# Patient Record
Sex: MALE | Race: WHITE | NOT HISPANIC OR LATINO | Employment: OTHER | URBAN - METROPOLITAN AREA
[De-identification: names, ages, dates, MRNs, and addresses within clinical notes are randomized per-mention and may not be internally consistent; named-entity substitution may affect disease eponyms.]

---

## 2017-01-02 ENCOUNTER — TRANSCRIBE ORDERS (OUTPATIENT)
Dept: RADIOLOGY | Facility: HOSPITAL | Age: 82
End: 2017-01-02

## 2017-01-02 ENCOUNTER — HOSPITAL ENCOUNTER (OUTPATIENT)
Dept: RADIOLOGY | Facility: HOSPITAL | Age: 82
Discharge: HOME | End: 2017-01-02
Payer: MEDICARE

## 2017-01-02 DIAGNOSIS — M54.5 LOW BACK PAIN, UNSPECIFIED BACK PAIN LATERALITY, UNSPECIFIED CHRONICITY, WITH SCIATICA PRESENCE UNSPECIFIED: ICD-10-CM

## 2017-01-02 DIAGNOSIS — M54.5 LOW BACK PAIN, UNSPECIFIED BACK PAIN LATERALITY, UNSPECIFIED CHRONICITY, WITH SCIATICA PRESENCE UNSPECIFIED: Primary | ICD-10-CM

## 2017-01-02 PROCEDURE — 72110 X-RAY EXAM L-2 SPINE 4/>VWS: CPT

## 2017-01-09 ENCOUNTER — GENERIC CONVERSION - ENCOUNTER (OUTPATIENT)
Dept: OTHER | Facility: OTHER | Age: 82
End: 2017-01-09

## 2017-01-18 ENCOUNTER — GENERIC CONVERSION - ENCOUNTER (OUTPATIENT)
Dept: OTHER | Facility: OTHER | Age: 82
End: 2017-01-18

## 2017-01-24 ENCOUNTER — ALLSCRIPTS OFFICE VISIT (OUTPATIENT)
Dept: OTHER | Facility: OTHER | Age: 82
End: 2017-01-24

## 2017-01-24 DIAGNOSIS — I10 ESSENTIAL (PRIMARY) HYPERTENSION: ICD-10-CM

## 2017-01-24 DIAGNOSIS — I25.10 ATHEROSCLEROTIC HEART DISEASE OF NATIVE CORONARY ARTERY WITHOUT ANGINA PECTORIS: ICD-10-CM

## 2017-01-24 DIAGNOSIS — E78.5 HYPERLIPIDEMIA: ICD-10-CM

## 2017-01-24 DIAGNOSIS — M54.2 CERVICALGIA: ICD-10-CM

## 2017-01-30 ENCOUNTER — APPOINTMENT (OUTPATIENT)
Dept: LAB | Facility: HOSPITAL | Age: 82
End: 2017-01-30
Attending: UROLOGY
Payer: MEDICARE

## 2017-01-30 ENCOUNTER — HOSPITAL ENCOUNTER (OUTPATIENT)
Dept: RADIOLOGY | Facility: HOSPITAL | Age: 82
Discharge: HOME | End: 2017-01-30
Attending: INTERNAL MEDICINE
Payer: MEDICARE

## 2017-01-30 ENCOUNTER — GENERIC CONVERSION - ENCOUNTER (OUTPATIENT)
Dept: OTHER | Facility: OTHER | Age: 82
End: 2017-01-30

## 2017-01-30 ENCOUNTER — APPOINTMENT (OUTPATIENT)
Dept: LAB | Facility: HOSPITAL | Age: 82
End: 2017-01-30
Attending: INTERNAL MEDICINE
Payer: MEDICARE

## 2017-01-30 ENCOUNTER — TRANSCRIBE ORDERS (OUTPATIENT)
Dept: ADMINISTRATIVE | Facility: HOSPITAL | Age: 82
End: 2017-01-30

## 2017-01-30 DIAGNOSIS — Z85.46 PERSONAL HISTORY OF MALIGNANT NEOPLASM OF PROSTATE: ICD-10-CM

## 2017-01-30 DIAGNOSIS — M54.2 CERVICALGIA: ICD-10-CM

## 2017-01-30 DIAGNOSIS — Z85.46 PERSONAL HISTORY OF MALIGNANT NEOPLASM OF PROSTATE: Primary | ICD-10-CM

## 2017-01-30 DIAGNOSIS — E78.5 HYPERLIPIDEMIA: ICD-10-CM

## 2017-01-30 DIAGNOSIS — I10 ESSENTIAL (PRIMARY) HYPERTENSION: ICD-10-CM

## 2017-01-30 DIAGNOSIS — I25.10 ATHEROSCLEROTIC HEART DISEASE OF NATIVE CORONARY ARTERY WITHOUT ANGINA PECTORIS: ICD-10-CM

## 2017-01-30 LAB
ALBUMIN SERPL BCP-MCNC: 3.2 G/DL (ref 3.5–5)
ALP SERPL-CCNC: 74 U/L (ref 46–116)
ALT SERPL W P-5'-P-CCNC: 25 U/L (ref 12–78)
ANION GAP SERPL CALCULATED.3IONS-SCNC: 8 MMOL/L (ref 4–13)
AST SERPL W P-5'-P-CCNC: 15 U/L (ref 5–45)
BILIRUB SERPL-MCNC: 0.6 MG/DL (ref 0.2–1)
BUN SERPL-MCNC: 30 MG/DL (ref 5–25)
CALCIUM SERPL-MCNC: 8.6 MG/DL (ref 8.3–10.1)
CHLORIDE SERPL-SCNC: 106 MMOL/L (ref 100–108)
CHOLEST SERPL-MCNC: 118 MG/DL (ref 50–200)
CO2 SERPL-SCNC: 26 MMOL/L (ref 21–32)
CREAT SERPL-MCNC: 1.13 MG/DL (ref 0.6–1.3)
ERYTHROCYTE [DISTWIDTH] IN BLOOD BY AUTOMATED COUNT: 12.9 % (ref 11.6–15.1)
EST. AVERAGE GLUCOSE BLD GHB EST-MCNC: 137 MG/DL
GFR SERPL CREATININE-BSD FRML MDRD: >60 ML/MIN/1.73SQ M
GLUCOSE SERPL-MCNC: 99 MG/DL (ref 65–140)
HBA1C MFR BLD: 6.4 % (ref 4.2–6.3)
HCT VFR BLD AUTO: 39.2 % (ref 36.5–49.3)
HDLC SERPL-MCNC: 52 MG/DL (ref 40–60)
HGB BLD-MCNC: 12.9 G/DL (ref 12–17)
LDLC SERPL CALC-MCNC: 53 MG/DL (ref 0–100)
MCH RBC QN AUTO: 30.3 PG (ref 26.8–34.3)
MCHC RBC AUTO-ENTMCNC: 32.9 G/DL (ref 31.4–37.4)
MCV RBC AUTO: 92 FL (ref 82–98)
PLATELET # BLD AUTO: 260 THOUSANDS/UL (ref 149–390)
PMV BLD AUTO: 9.3 FL (ref 8.9–12.7)
POTASSIUM SERPL-SCNC: 4.4 MMOL/L (ref 3.5–5.3)
PROT SERPL-MCNC: 7.2 G/DL (ref 6.4–8.2)
PSA SERPL-MCNC: 0.4 NG/ML (ref 0–4)
RBC # BLD AUTO: 4.26 MILLION/UL (ref 3.88–5.62)
SODIUM SERPL-SCNC: 140 MMOL/L (ref 136–145)
TRIGL SERPL-MCNC: 67 MG/DL
TSH SERPL DL<=0.05 MIU/L-ACNC: 0.98 UIU/ML (ref 0.36–3.74)
WBC # BLD AUTO: 14.07 THOUSAND/UL (ref 4.31–10.16)

## 2017-01-30 PROCEDURE — 84443 ASSAY THYROID STIM HORMONE: CPT

## 2017-01-30 PROCEDURE — 36415 COLL VENOUS BLD VENIPUNCTURE: CPT

## 2017-01-30 PROCEDURE — 80061 LIPID PANEL: CPT

## 2017-01-30 PROCEDURE — 72050 X-RAY EXAM NECK SPINE 4/5VWS: CPT

## 2017-01-30 PROCEDURE — 83036 HEMOGLOBIN GLYCOSYLATED A1C: CPT

## 2017-01-30 PROCEDURE — G0103 PSA SCREENING: HCPCS

## 2017-01-30 PROCEDURE — 85027 COMPLETE CBC AUTOMATED: CPT

## 2017-01-30 PROCEDURE — 80053 COMPREHEN METABOLIC PANEL: CPT

## 2017-01-31 ENCOUNTER — GENERIC CONVERSION - ENCOUNTER (OUTPATIENT)
Dept: OTHER | Facility: OTHER | Age: 82
End: 2017-01-31

## 2017-02-05 ENCOUNTER — HOSPITAL ENCOUNTER (OUTPATIENT)
Facility: HOSPITAL | Age: 82
Setting detail: OBSERVATION
Discharge: HOME | End: 2017-02-06
Attending: EMERGENCY MEDICINE | Admitting: INTERNAL MEDICINE
Payer: MEDICARE

## 2017-02-05 ENCOUNTER — APPOINTMENT (EMERGENCY)
Dept: CT IMAGING | Facility: HOSPITAL | Age: 82
End: 2017-02-05
Payer: MEDICARE

## 2017-02-05 ENCOUNTER — GENERIC CONVERSION - ENCOUNTER (OUTPATIENT)
Dept: OTHER | Facility: OTHER | Age: 82
End: 2017-02-05

## 2017-02-05 DIAGNOSIS — J01.90 ACUTE SINUSITIS: ICD-10-CM

## 2017-02-05 DIAGNOSIS — R55 NEAR SYNCOPE: Primary | ICD-10-CM

## 2017-02-05 PROBLEM — G93.40 ACUTE ENCEPHALOPATHY: Status: ACTIVE | Noted: 2017-02-05

## 2017-02-05 PROBLEM — I25.10 CAD (CORONARY ARTERY DISEASE): Chronic | Status: ACTIVE | Noted: 2017-02-05

## 2017-02-05 LAB
ALBUMIN SERPL BCP-MCNC: 3.2 G/DL (ref 3.5–5)
ALP SERPL-CCNC: 74 U/L (ref 46–116)
ALT SERPL W P-5'-P-CCNC: 22 U/L (ref 12–78)
ANION GAP SERPL CALCULATED.3IONS-SCNC: 7 MMOL/L (ref 4–13)
AST SERPL W P-5'-P-CCNC: 20 U/L (ref 5–45)
BASOPHILS # BLD AUTO: 0.02 THOUSANDS/ΜL (ref 0–0.1)
BASOPHILS NFR BLD AUTO: 0 % (ref 0–1)
BILIRUB SERPL-MCNC: 0.6 MG/DL (ref 0.2–1)
BILIRUB UR QL STRIP: NEGATIVE
BUN SERPL-MCNC: 27 MG/DL (ref 5–25)
CALCIUM SERPL-MCNC: 9 MG/DL (ref 8.3–10.1)
CHLORIDE SERPL-SCNC: 104 MMOL/L (ref 100–108)
CLARITY UR: CLEAR
CO2 SERPL-SCNC: 28 MMOL/L (ref 21–32)
COLOR UR: YELLOW
CREAT SERPL-MCNC: 1.22 MG/DL (ref 0.6–1.3)
EOSINOPHIL # BLD AUTO: 0.16 THOUSAND/ΜL (ref 0–0.61)
EOSINOPHIL NFR BLD AUTO: 2 % (ref 0–6)
ERYTHROCYTE [DISTWIDTH] IN BLOOD BY AUTOMATED COUNT: 12.9 % (ref 11.6–15.1)
GFR SERPL CREATININE-BSD FRML MDRD: 57 ML/MIN/1.73SQ M
GLUCOSE SERPL-MCNC: 99 MG/DL (ref 65–140)
GLUCOSE UR STRIP-MCNC: ABNORMAL MG/DL
HCT VFR BLD AUTO: 37.2 % (ref 36.5–49.3)
HGB BLD-MCNC: 12.2 G/DL (ref 12–17)
HGB UR QL STRIP.AUTO: NEGATIVE
KETONES UR STRIP-MCNC: NEGATIVE MG/DL
LEUKOCYTE ESTERASE UR QL STRIP: NEGATIVE
LYMPHOCYTES # BLD AUTO: 1.5 THOUSANDS/ΜL (ref 0.6–4.47)
LYMPHOCYTES NFR BLD AUTO: 15 % (ref 14–44)
MCH RBC QN AUTO: 30.3 PG (ref 26.8–34.3)
MCHC RBC AUTO-ENTMCNC: 32.8 G/DL (ref 31.4–37.4)
MCV RBC AUTO: 92 FL (ref 82–98)
MONOCYTES # BLD AUTO: 0.75 THOUSAND/ΜL (ref 0.17–1.22)
MONOCYTES NFR BLD AUTO: 7 % (ref 4–12)
NEUTROPHILS # BLD AUTO: 7.8 THOUSANDS/ΜL (ref 1.85–7.62)
NEUTS SEG NFR BLD AUTO: 76 % (ref 43–75)
NITRITE UR QL STRIP: NEGATIVE
NT-PROBNP SERPL-MCNC: 1080 PG/ML
PH UR STRIP.AUTO: 5.5 [PH] (ref 4.5–8)
PLATELET # BLD AUTO: 228 THOUSANDS/UL (ref 149–390)
PMV BLD AUTO: 9.2 FL (ref 8.9–12.7)
POTASSIUM SERPL-SCNC: 4.5 MMOL/L (ref 3.5–5.3)
PROT SERPL-MCNC: 7.1 G/DL (ref 6.4–8.2)
PROT UR STRIP-MCNC: NEGATIVE MG/DL
RBC # BLD AUTO: 4.03 MILLION/UL (ref 3.88–5.62)
SODIUM SERPL-SCNC: 139 MMOL/L (ref 136–145)
SP GR UR STRIP.AUTO: 1.02 (ref 1–1.03)
TROPONIN I SERPL-MCNC: <0.02 NG/ML
TROPONIN I SERPL-MCNC: <0.02 NG/ML
UROBILINOGEN UR QL STRIP.AUTO: 0.2 E.U./DL
WBC # BLD AUTO: 10.23 THOUSAND/UL (ref 4.31–10.16)

## 2017-02-05 PROCEDURE — 93005 ELECTROCARDIOGRAM TRACING: CPT | Performed by: EMERGENCY MEDICINE

## 2017-02-05 PROCEDURE — 84484 ASSAY OF TROPONIN QUANT: CPT | Performed by: NURSE PRACTITIONER

## 2017-02-05 PROCEDURE — 84484 ASSAY OF TROPONIN QUANT: CPT | Performed by: EMERGENCY MEDICINE

## 2017-02-05 PROCEDURE — 85025 COMPLETE CBC W/AUTO DIFF WBC: CPT | Performed by: EMERGENCY MEDICINE

## 2017-02-05 PROCEDURE — 80053 COMPREHEN METABOLIC PANEL: CPT | Performed by: EMERGENCY MEDICINE

## 2017-02-05 PROCEDURE — 36415 COLL VENOUS BLD VENIPUNCTURE: CPT | Performed by: EMERGENCY MEDICINE

## 2017-02-05 PROCEDURE — 70450 CT HEAD/BRAIN W/O DYE: CPT

## 2017-02-05 PROCEDURE — 99285 EMERGENCY DEPT VISIT HI MDM: CPT

## 2017-02-05 PROCEDURE — 81003 URINALYSIS AUTO W/O SCOPE: CPT | Performed by: EMERGENCY MEDICINE

## 2017-02-05 PROCEDURE — 83880 ASSAY OF NATRIURETIC PEPTIDE: CPT | Performed by: NURSE PRACTITIONER

## 2017-02-05 RX ORDER — CLOPIDOGREL BISULFATE 75 MG/1
75 TABLET ORAL DAILY
COMMUNITY
End: 2018-08-05 | Stop reason: SDUPTHER

## 2017-02-05 RX ORDER — AZITHROMYCIN 250 MG/1
250 TABLET, FILM COATED ORAL EVERY 24 HOURS
Status: DISCONTINUED | OUTPATIENT
Start: 2017-02-06 | End: 2017-02-06 | Stop reason: HOSPADM

## 2017-02-05 RX ORDER — MAGNESIUM HYDROXIDE/ALUMINUM HYDROXICE/SIMETHICONE 120; 1200; 1200 MG/30ML; MG/30ML; MG/30ML
30 SUSPENSION ORAL EVERY 6 HOURS PRN
Status: DISCONTINUED | OUTPATIENT
Start: 2017-02-05 | End: 2017-02-06 | Stop reason: HOSPADM

## 2017-02-05 RX ORDER — DOCUSATE SODIUM 100 MG/1
100 CAPSULE, LIQUID FILLED ORAL 2 TIMES DAILY
Status: DISCONTINUED | OUTPATIENT
Start: 2017-02-05 | End: 2017-02-06 | Stop reason: HOSPADM

## 2017-02-05 RX ORDER — LISINOPRIL 20 MG/1
20 TABLET ORAL DAILY
COMMUNITY
End: 2018-05-16 | Stop reason: SDUPTHER

## 2017-02-05 RX ORDER — NITROGLYCERIN 0.4 MG/1
0.4 TABLET SUBLINGUAL
Status: DISCONTINUED | OUTPATIENT
Start: 2017-02-05 | End: 2017-02-06 | Stop reason: HOSPADM

## 2017-02-05 RX ORDER — ATORVASTATIN CALCIUM 20 MG/1
20 TABLET, FILM COATED ORAL DAILY
Status: ON HOLD | COMMUNITY
End: 2017-08-21

## 2017-02-05 RX ORDER — ATORVASTATIN CALCIUM 20 MG/1
20 TABLET, FILM COATED ORAL
Status: COMPLETED | OUTPATIENT
Start: 2017-02-05 | End: 2017-02-05

## 2017-02-05 RX ORDER — HYDROCODONE BITARTRATE AND ACETAMINOPHEN 5; 325 MG/1; MG/1
1 TABLET ORAL EVERY 6 HOURS PRN
Status: DISCONTINUED | OUTPATIENT
Start: 2017-02-05 | End: 2017-02-06 | Stop reason: HOSPADM

## 2017-02-05 RX ORDER — CLOPIDOGREL BISULFATE 75 MG/1
75 TABLET ORAL DAILY
Status: DISCONTINUED | OUTPATIENT
Start: 2017-02-06 | End: 2017-02-06 | Stop reason: HOSPADM

## 2017-02-05 RX ORDER — HYDROCODONE BITARTRATE AND ACETAMINOPHEN 5; 325 MG/1; MG/1
1 TABLET ORAL EVERY 6 HOURS PRN
COMMUNITY
End: 2018-02-27 | Stop reason: CLARIF

## 2017-02-05 RX ORDER — METOPROLOL TARTRATE 50 MG/1
50 TABLET, FILM COATED ORAL 2 TIMES DAILY
COMMUNITY
End: 2019-01-01 | Stop reason: CLARIF

## 2017-02-05 RX ORDER — GABAPENTIN 300 MG/1
300 CAPSULE ORAL 3 TIMES DAILY
Status: DISCONTINUED | OUTPATIENT
Start: 2017-02-06 | End: 2017-02-06 | Stop reason: HOSPADM

## 2017-02-05 RX ORDER — AZITHROMYCIN 250 MG/1
500 TABLET, FILM COATED ORAL ONCE
Status: COMPLETED | OUTPATIENT
Start: 2017-02-05 | End: 2017-02-05

## 2017-02-05 RX ORDER — ATORVASTATIN CALCIUM 20 MG/1
20 TABLET, FILM COATED ORAL
Status: DISCONTINUED | OUTPATIENT
Start: 2017-02-06 | End: 2017-02-06 | Stop reason: HOSPADM

## 2017-02-05 RX ORDER — SODIUM CHLORIDE 9 MG/ML
100 INJECTION, SOLUTION INTRAVENOUS CONTINUOUS
Status: DISPENSED | OUTPATIENT
Start: 2017-02-05 | End: 2017-02-06

## 2017-02-05 RX ORDER — ASPIRIN 81 MG/1
81 TABLET ORAL DAILY
Status: DISCONTINUED | OUTPATIENT
Start: 2017-02-06 | End: 2017-02-06 | Stop reason: HOSPADM

## 2017-02-05 RX ORDER — ASPIRIN 81 MG/1
81 TABLET ORAL DAILY
COMMUNITY
End: 2018-01-01 | Stop reason: HOSPADM

## 2017-02-05 RX ORDER — TAMSULOSIN HYDROCHLORIDE 0.4 MG/1
0.4 CAPSULE ORAL
Status: DISCONTINUED | OUTPATIENT
Start: 2017-02-06 | End: 2017-02-06 | Stop reason: HOSPADM

## 2017-02-05 RX ORDER — ONDANSETRON 2 MG/ML
4 INJECTION INTRAMUSCULAR; INTRAVENOUS EVERY 6 HOURS PRN
Status: DISCONTINUED | OUTPATIENT
Start: 2017-02-05 | End: 2017-02-06 | Stop reason: HOSPADM

## 2017-02-05 RX ORDER — TAMSULOSIN HYDROCHLORIDE 0.4 MG/1
0.4 CAPSULE ORAL
Status: COMPLETED | OUTPATIENT
Start: 2017-02-05 | End: 2017-02-05

## 2017-02-05 RX ORDER — METOPROLOL TARTRATE 50 MG/1
50 TABLET, FILM COATED ORAL 2 TIMES DAILY
Status: DISCONTINUED | OUTPATIENT
Start: 2017-02-05 | End: 2017-02-06 | Stop reason: HOSPADM

## 2017-02-05 RX ORDER — GABAPENTIN 300 MG/1
300 CAPSULE ORAL 3 TIMES DAILY
COMMUNITY
End: 2018-02-22

## 2017-02-05 RX ORDER — NITROGLYCERIN 0.4 MG/1
0.4 TABLET SUBLINGUAL
COMMUNITY
End: 2018-02-27 | Stop reason: SDUPTHER

## 2017-02-05 RX ORDER — MONTELUKAST SODIUM 10 MG/1
10 TABLET ORAL
Status: DISCONTINUED | OUTPATIENT
Start: 2017-02-05 | End: 2017-02-06 | Stop reason: HOSPADM

## 2017-02-05 RX ORDER — TAMSULOSIN HYDROCHLORIDE 0.4 MG/1
0.4 CAPSULE ORAL
COMMUNITY
End: 2019-01-01 | Stop reason: SDUPTHER

## 2017-02-05 RX ORDER — MONTELUKAST SODIUM 10 MG/1
10 TABLET ORAL
COMMUNITY
End: 2018-07-15 | Stop reason: SDUPTHER

## 2017-02-05 RX ORDER — LISINOPRIL 20 MG/1
20 TABLET ORAL DAILY
Status: DISCONTINUED | OUTPATIENT
Start: 2017-02-06 | End: 2017-02-06 | Stop reason: HOSPADM

## 2017-02-05 RX ADMIN — TAMSULOSIN HYDROCHLORIDE 0.4 MG: 0.4 CAPSULE ORAL at 22:41

## 2017-02-05 RX ADMIN — METOPROLOL TARTRATE 50 MG: 50 TABLET ORAL at 22:41

## 2017-02-05 RX ADMIN — ATORVASTATIN CALCIUM 20 MG: 20 TABLET, FILM COATED ORAL at 22:41

## 2017-02-05 RX ADMIN — AZITHROMYCIN 500 MG: 250 TABLET, FILM COATED ORAL at 19:11

## 2017-02-05 RX ADMIN — SODIUM CHLORIDE 100 ML/HR: 0.9 INJECTION, SOLUTION INTRAVENOUS at 22:26

## 2017-02-06 ENCOUNTER — GENERIC CONVERSION - ENCOUNTER (OUTPATIENT)
Dept: OTHER | Facility: OTHER | Age: 82
End: 2017-02-06

## 2017-02-06 ENCOUNTER — APPOINTMENT (OUTPATIENT)
Dept: NON INVASIVE DIAGNOSTICS | Facility: HOSPITAL | Age: 82
End: 2017-02-06
Payer: MEDICARE

## 2017-02-06 VITALS
WEIGHT: 176.37 LBS | DIASTOLIC BLOOD PRESSURE: 82 MMHG | SYSTOLIC BLOOD PRESSURE: 181 MMHG | HEART RATE: 70 BPM | RESPIRATION RATE: 18 BRPM | HEIGHT: 68 IN | BODY MASS INDEX: 26.73 KG/M2 | TEMPERATURE: 97.8 F | OXYGEN SATURATION: 94 %

## 2017-02-06 PROBLEM — G93.40 ACUTE ENCEPHALOPATHY: Status: RESOLVED | Noted: 2017-02-05 | Resolved: 2017-02-06

## 2017-02-06 PROBLEM — R55 NEAR SYNCOPE: Status: RESOLVED | Noted: 2017-02-05 | Resolved: 2017-02-06

## 2017-02-06 PROBLEM — J01.90 ACUTE SINUSITIS: Status: RESOLVED | Noted: 2017-02-05 | Resolved: 2017-02-06

## 2017-02-06 LAB
ANION GAP SERPL CALCULATED.3IONS-SCNC: 8 MMOL/L (ref 4–13)
ATRIAL RATE: 64 BPM
ATRIAL RATE: 65 BPM
BUN SERPL-MCNC: 21 MG/DL (ref 5–25)
CALCIUM SERPL-MCNC: 8.1 MG/DL (ref 8.3–10.1)
CHLORIDE SERPL-SCNC: 108 MMOL/L (ref 100–108)
CO2 SERPL-SCNC: 26 MMOL/L (ref 21–32)
CREAT SERPL-MCNC: 1.01 MG/DL (ref 0.6–1.3)
ERYTHROCYTE [DISTWIDTH] IN BLOOD BY AUTOMATED COUNT: 12.8 % (ref 11.6–15.1)
GFR SERPL CREATININE-BSD FRML MDRD: >60 ML/MIN/1.73SQ M
GLUCOSE SERPL-MCNC: 87 MG/DL (ref 65–140)
HCT VFR BLD AUTO: 34.1 % (ref 36.5–49.3)
HGB BLD-MCNC: 11.2 G/DL (ref 12–17)
MAGNESIUM SERPL-MCNC: 2 MG/DL (ref 1.6–2.6)
MCH RBC QN AUTO: 30.7 PG (ref 26.8–34.3)
MCHC RBC AUTO-ENTMCNC: 32.8 G/DL (ref 31.4–37.4)
MCV RBC AUTO: 93 FL (ref 82–98)
P AXIS: 61 DEGREES
P AXIS: 65 DEGREES
PLATELET # BLD AUTO: 202 THOUSANDS/UL (ref 149–390)
PMV BLD AUTO: 9.7 FL (ref 8.9–12.7)
POTASSIUM SERPL-SCNC: 4.2 MMOL/L (ref 3.5–5.3)
PR INTERVAL: 170 MS
PR INTERVAL: 174 MS
QRS AXIS: 25 DEGREES
QRS AXIS: 51 DEGREES
QRSD INTERVAL: 90 MS
QRSD INTERVAL: 92 MS
QT INTERVAL: 406 MS
QT INTERVAL: 422 MS
QTC INTERVAL: 422 MS
QTC INTERVAL: 435 MS
RBC # BLD AUTO: 3.65 MILLION/UL (ref 3.88–5.62)
SODIUM SERPL-SCNC: 142 MMOL/L (ref 136–145)
T WAVE AXIS: 1 DEGREES
T WAVE AXIS: 30 DEGREES
TROPONIN I SERPL-MCNC: <0.02 NG/ML
VENTRICULAR RATE: 64 BPM
VENTRICULAR RATE: 65 BPM
WBC # BLD AUTO: 6.18 THOUSAND/UL (ref 4.31–10.16)

## 2017-02-06 PROCEDURE — 85027 COMPLETE CBC AUTOMATED: CPT | Performed by: NURSE PRACTITIONER

## 2017-02-06 PROCEDURE — 93306 TTE W/DOPPLER COMPLETE: CPT

## 2017-02-06 PROCEDURE — 93005 ELECTROCARDIOGRAM TRACING: CPT | Performed by: NURSE PRACTITIONER

## 2017-02-06 PROCEDURE — 80048 BASIC METABOLIC PNL TOTAL CA: CPT | Performed by: NURSE PRACTITIONER

## 2017-02-06 PROCEDURE — 83735 ASSAY OF MAGNESIUM: CPT | Performed by: NURSE PRACTITIONER

## 2017-02-06 PROCEDURE — 84484 ASSAY OF TROPONIN QUANT: CPT | Performed by: NURSE PRACTITIONER

## 2017-02-06 RX ORDER — NICOTINE 21 MG/24HR
1 PATCH, TRANSDERMAL 24 HOURS TRANSDERMAL DAILY
Qty: 30 PATCH | Refills: 0 | Status: SHIPPED | OUTPATIENT
Start: 2017-02-06 | End: 2017-03-08

## 2017-02-06 RX ORDER — NICOTINE 21 MG/24HR
14 PATCH, TRANSDERMAL 24 HOURS TRANSDERMAL DAILY
Status: DISCONTINUED | OUTPATIENT
Start: 2017-02-06 | End: 2017-02-06 | Stop reason: HOSPADM

## 2017-02-06 RX ORDER — AZITHROMYCIN 250 MG/1
250 TABLET, FILM COATED ORAL EVERY 24 HOURS
Qty: 4 TABLET | Refills: 0 | Status: SHIPPED | OUTPATIENT
Start: 2017-02-06 | End: 2017-02-10

## 2017-02-06 RX ADMIN — CLOPIDOGREL 75 MG: 75 TABLET, FILM COATED ORAL at 09:36

## 2017-02-06 RX ADMIN — METOPROLOL TARTRATE 50 MG: 50 TABLET ORAL at 09:36

## 2017-02-06 RX ADMIN — ASPIRIN 81 MG: 81 TABLET, COATED ORAL at 09:36

## 2017-02-06 RX ADMIN — LISINOPRIL 20 MG: 20 TABLET ORAL at 09:36

## 2017-02-06 RX ADMIN — HYDROCODONE BITARTRATE AND ACETAMINOPHEN 1 TABLET: 5; 325 TABLET ORAL at 09:42

## 2017-02-14 ENCOUNTER — APPOINTMENT (OUTPATIENT)
Dept: PHYSICAL THERAPY | Facility: CLINIC | Age: 82
End: 2017-02-14
Payer: MEDICARE

## 2017-02-14 DIAGNOSIS — M54.2 CERVICALGIA: ICD-10-CM

## 2017-02-14 PROCEDURE — 97162 PT EVAL MOD COMPLEX 30 MIN: CPT

## 2017-02-14 PROCEDURE — G8981 BODY POS CURRENT STATUS: HCPCS

## 2017-02-14 PROCEDURE — 97014 ELECTRIC STIMULATION THERAPY: CPT

## 2017-02-14 PROCEDURE — G8982 BODY POS GOAL STATUS: HCPCS

## 2017-02-15 ENCOUNTER — GENERIC CONVERSION - ENCOUNTER (OUTPATIENT)
Dept: OTHER | Facility: OTHER | Age: 82
End: 2017-02-15

## 2017-02-17 ENCOUNTER — APPOINTMENT (OUTPATIENT)
Dept: PHYSICAL THERAPY | Facility: CLINIC | Age: 82
End: 2017-02-17
Payer: MEDICARE

## 2017-02-17 PROCEDURE — 97014 ELECTRIC STIMULATION THERAPY: CPT

## 2017-02-17 PROCEDURE — 97110 THERAPEUTIC EXERCISES: CPT

## 2017-02-17 PROCEDURE — 97140 MANUAL THERAPY 1/> REGIONS: CPT

## 2017-02-21 ENCOUNTER — APPOINTMENT (OUTPATIENT)
Dept: PHYSICAL THERAPY | Facility: CLINIC | Age: 82
End: 2017-02-21
Payer: MEDICARE

## 2017-02-21 PROCEDURE — 97014 ELECTRIC STIMULATION THERAPY: CPT

## 2017-02-21 PROCEDURE — 97140 MANUAL THERAPY 1/> REGIONS: CPT

## 2017-02-21 PROCEDURE — 97110 THERAPEUTIC EXERCISES: CPT

## 2017-02-22 ENCOUNTER — ALLSCRIPTS OFFICE VISIT (OUTPATIENT)
Dept: OTHER | Facility: OTHER | Age: 82
End: 2017-02-22

## 2017-02-24 ENCOUNTER — APPOINTMENT (OUTPATIENT)
Dept: PHYSICAL THERAPY | Facility: CLINIC | Age: 82
End: 2017-02-24
Payer: MEDICARE

## 2017-02-24 PROCEDURE — 97140 MANUAL THERAPY 1/> REGIONS: CPT

## 2017-02-24 PROCEDURE — 97014 ELECTRIC STIMULATION THERAPY: CPT

## 2017-02-24 PROCEDURE — G8982 BODY POS GOAL STATUS: HCPCS

## 2017-02-24 PROCEDURE — G8983 BODY POS D/C STATUS: HCPCS

## 2017-03-01 ENCOUNTER — HOSPITAL ENCOUNTER (OUTPATIENT)
Dept: NON INVASIVE DIAGNOSTICS | Facility: CLINIC | Age: 82
Discharge: HOME | End: 2017-03-01
Payer: MEDICARE

## 2017-03-01 DIAGNOSIS — I25.10 ATHEROSCLEROTIC HEART DISEASE OF NATIVE CORONARY ARTERY WITHOUT ANGINA PECTORIS: ICD-10-CM

## 2017-03-01 DIAGNOSIS — I10 ESSENTIAL (PRIMARY) HYPERTENSION: ICD-10-CM

## 2017-03-01 PROCEDURE — A9502 TC99M TETROFOSMIN: HCPCS

## 2017-03-01 PROCEDURE — 78452 HT MUSCLE IMAGE SPECT MULT: CPT

## 2017-03-01 PROCEDURE — 93017 CV STRESS TEST TRACING ONLY: CPT

## 2017-03-01 RX ADMIN — REGADENOSON 0.4 MG: 0.08 INJECTION, SOLUTION INTRAVENOUS at 09:00

## 2017-03-03 LAB
MAX DIASTOLIC BP: 79 MMHG
MAX HEART RATE: 77 BPM
MAX PREDICTED HEART RATE: 139 BPM
MAX. SYSTOLIC BP: 167 MMHG
PROTOCOL NAME: NORMAL
REASON FOR TERMINATION: NORMAL
TARGET HR FORMULA: NORMAL
TEST INDICATION: NORMAL
TIME IN EXERCISE PHASE: 165 S

## 2017-03-13 ENCOUNTER — ALLSCRIPTS OFFICE VISIT (OUTPATIENT)
Dept: OTHER | Facility: OTHER | Age: 82
End: 2017-03-13

## 2017-03-20 ENCOUNTER — ALLSCRIPTS OFFICE VISIT (OUTPATIENT)
Dept: OTHER | Facility: OTHER | Age: 82
End: 2017-03-20

## 2017-05-24 ENCOUNTER — GENERIC CONVERSION - ENCOUNTER (OUTPATIENT)
Dept: OTHER | Facility: OTHER | Age: 82
End: 2017-05-24

## 2017-05-31 ENCOUNTER — ALLSCRIPTS OFFICE VISIT (OUTPATIENT)
Dept: OTHER | Facility: OTHER | Age: 82
End: 2017-05-31

## 2017-05-31 DIAGNOSIS — I10 ESSENTIAL (PRIMARY) HYPERTENSION: ICD-10-CM

## 2017-05-31 DIAGNOSIS — I71.2 THORACIC AORTIC ANEURYSM WITHOUT RUPTURE (HCC): ICD-10-CM

## 2017-06-12 ENCOUNTER — TRANSCRIBE ORDERS (OUTPATIENT)
Dept: ADMINISTRATIVE | Facility: HOSPITAL | Age: 82
End: 2017-06-12

## 2017-06-12 ENCOUNTER — APPOINTMENT (OUTPATIENT)
Dept: LAB | Facility: HOSPITAL | Age: 82
End: 2017-06-12
Attending: INTERNAL MEDICINE
Payer: MEDICARE

## 2017-06-12 DIAGNOSIS — I71.2 THORACIC AORTIC ANEURYSM WITHOUT RUPTURE (HCC): ICD-10-CM

## 2017-06-12 DIAGNOSIS — I10 ESSENTIAL (PRIMARY) HYPERTENSION: ICD-10-CM

## 2017-06-12 LAB
ANION GAP SERPL CALCULATED.3IONS-SCNC: 7 MMOL/L (ref 4–13)
BUN SERPL-MCNC: 13 MG/DL (ref 5–25)
CALCIUM SERPL-MCNC: 8.7 MG/DL (ref 8.3–10.1)
CHLORIDE SERPL-SCNC: 108 MMOL/L (ref 100–108)
CO2 SERPL-SCNC: 28 MMOL/L (ref 21–32)
CREAT SERPL-MCNC: 1.27 MG/DL (ref 0.6–1.3)
GFR SERPL CREATININE-BSD FRML MDRD: 54.4 ML/MIN/1.73SQ M
GLUCOSE SERPL-MCNC: 111 MG/DL (ref 65–140)
POTASSIUM SERPL-SCNC: 3.6 MMOL/L (ref 3.5–5.3)
SODIUM SERPL-SCNC: 143 MMOL/L (ref 136–145)

## 2017-06-12 PROCEDURE — 80048 BASIC METABOLIC PNL TOTAL CA: CPT

## 2017-06-12 PROCEDURE — 36415 COLL VENOUS BLD VENIPUNCTURE: CPT

## 2017-06-13 ENCOUNTER — GENERIC CONVERSION - ENCOUNTER (OUTPATIENT)
Dept: OTHER | Facility: OTHER | Age: 82
End: 2017-06-13

## 2017-06-16 ENCOUNTER — GENERIC CONVERSION - ENCOUNTER (OUTPATIENT)
Dept: OTHER | Facility: OTHER | Age: 82
End: 2017-06-16

## 2017-06-19 ENCOUNTER — ALLSCRIPTS OFFICE VISIT (OUTPATIENT)
Dept: OTHER | Facility: OTHER | Age: 82
End: 2017-06-19

## 2017-07-07 ENCOUNTER — GENERIC CONVERSION - ENCOUNTER (OUTPATIENT)
Dept: OTHER | Facility: OTHER | Age: 82
End: 2017-07-07

## 2017-07-13 ENCOUNTER — HOSPITAL ENCOUNTER (OUTPATIENT)
Dept: CT IMAGING | Facility: HOSPITAL | Age: 82
Discharge: HOME | End: 2017-07-13
Attending: INTERNAL MEDICINE
Payer: MEDICARE

## 2017-07-20 ENCOUNTER — HOSPITAL ENCOUNTER (OUTPATIENT)
Dept: CT IMAGING | Facility: HOSPITAL | Age: 82
Discharge: HOME | End: 2017-07-20
Attending: INTERNAL MEDICINE
Payer: MEDICARE

## 2017-07-20 VITALS
SYSTOLIC BLOOD PRESSURE: 140 MMHG | DIASTOLIC BLOOD PRESSURE: 73 MMHG | RESPIRATION RATE: 18 BRPM | HEART RATE: 69 BPM | OXYGEN SATURATION: 99 %

## 2017-07-20 DIAGNOSIS — I10 ESSENTIAL (PRIMARY) HYPERTENSION: ICD-10-CM

## 2017-07-20 PROCEDURE — 75574 CT ANGIO HRT W/3D IMAGE: CPT

## 2017-07-20 RX ORDER — NITROGLYCERIN 0.4 MG/1
0.4 TABLET SUBLINGUAL ONCE
Status: COMPLETED | OUTPATIENT
Start: 2017-07-20 | End: 2017-07-20

## 2017-07-20 RX ADMIN — IODIXANOL 100 ML: 320 INJECTION, SOLUTION INTRAVASCULAR at 10:11

## 2017-07-20 RX ADMIN — NITROGLYCERIN 0.4 MG: 0.4 TABLET SUBLINGUAL at 10:05

## 2017-08-09 ENCOUNTER — HOSPITAL ENCOUNTER (OUTPATIENT)
Dept: NON INVASIVE DIAGNOSTICS | Facility: CLINIC | Age: 82
Discharge: HOME | End: 2017-08-09
Payer: MEDICARE

## 2017-08-09 ENCOUNTER — TRANSCRIBE ORDERS (OUTPATIENT)
Dept: ADMINISTRATIVE | Facility: HOSPITAL | Age: 82
End: 2017-08-09

## 2017-08-09 ENCOUNTER — ALLSCRIPTS OFFICE VISIT (OUTPATIENT)
Dept: OTHER | Facility: OTHER | Age: 82
End: 2017-08-09

## 2017-08-09 DIAGNOSIS — H53.10: ICD-10-CM

## 2017-08-09 DIAGNOSIS — I10 ESSENTIAL (PRIMARY) HYPERTENSION: ICD-10-CM

## 2017-08-09 DIAGNOSIS — I25.10 ATHEROSCLEROTIC HEART DISEASE OF NATIVE CORONARY ARTERY WITHOUT ANGINA PECTORIS: ICD-10-CM

## 2017-08-09 PROCEDURE — 93880 EXTRACRANIAL BILAT STUDY: CPT

## 2017-08-18 PROBLEM — I71.2 THORACIC AORTIC ANEURYSM (HCC): Chronic | Status: ACTIVE | Noted: 2017-08-18

## 2017-08-18 RX ORDER — SODIUM CHLORIDE 9 MG/ML
125 INJECTION, SOLUTION INTRAVENOUS CONTINUOUS
Status: CANCELLED | OUTPATIENT
Start: 2017-08-18

## 2017-08-18 RX ORDER — ASPIRIN 81 MG/1
324 TABLET, CHEWABLE ORAL ONCE
Status: CANCELLED | OUTPATIENT
Start: 2017-08-18 | End: 2017-08-18

## 2017-08-20 ENCOUNTER — TELEPHONE (OUTPATIENT)
Dept: INPATIENT UNIT | Facility: HOSPITAL | Age: 82
End: 2017-08-20

## 2017-08-21 ENCOUNTER — GENERIC CONVERSION - ENCOUNTER (OUTPATIENT)
Dept: OTHER | Facility: OTHER | Age: 82
End: 2017-08-21

## 2017-08-21 ENCOUNTER — HOSPITAL ENCOUNTER (OUTPATIENT)
Dept: NON INVASIVE DIAGNOSTICS | Facility: HOSPITAL | Age: 82
Discharge: HOME | End: 2017-08-21
Attending: INTERNAL MEDICINE | Admitting: INTERNAL MEDICINE
Payer: MEDICARE

## 2017-08-21 VITALS
HEART RATE: 59 BPM | WEIGHT: 163 LBS | BODY MASS INDEX: 24.71 KG/M2 | DIASTOLIC BLOOD PRESSURE: 71 MMHG | RESPIRATION RATE: 18 BRPM | OXYGEN SATURATION: 97 % | SYSTOLIC BLOOD PRESSURE: 143 MMHG | HEIGHT: 68 IN

## 2017-08-21 DIAGNOSIS — R07.9 CHEST PAIN, UNSPECIFIED: ICD-10-CM

## 2017-08-21 LAB
ANION GAP SERPL CALCULATED.3IONS-SCNC: 7 MMOL/L (ref 4–13)
ATRIAL RATE: 57 BPM
BUN SERPL-MCNC: 18 MG/DL (ref 5–25)
CALCIUM SERPL-MCNC: 8.7 MG/DL (ref 8.3–10.1)
CHLORIDE SERPL-SCNC: 107 MMOL/L (ref 100–108)
CHOLEST SERPL-MCNC: 117 MG/DL (ref 50–200)
CO2 SERPL-SCNC: 26 MMOL/L (ref 21–32)
CREAT SERPL-MCNC: 1.04 MG/DL (ref 0.6–1.3)
ERYTHROCYTE [DISTWIDTH] IN BLOOD BY AUTOMATED COUNT: 13.6 % (ref 11.6–15.1)
GFR SERPL CREATININE-BSD FRML MDRD: 67 ML/MIN/1.73SQ M
GLUCOSE P FAST SERPL-MCNC: 99 MG/DL (ref 65–99)
GLUCOSE SERPL-MCNC: 99 MG/DL (ref 65–140)
HCT VFR BLD AUTO: 39.5 % (ref 36.5–49.3)
HDLC SERPL-MCNC: 45 MG/DL (ref 40–60)
HGB BLD-MCNC: 13.7 G/DL (ref 12–17)
INR PPP: 1.02 (ref 0.86–1.16)
LDLC SERPL CALC-MCNC: 50 MG/DL (ref 0–100)
MAGNESIUM SERPL-MCNC: 2.5 MG/DL (ref 1.6–2.6)
MCH RBC QN AUTO: 31.1 PG (ref 26.8–34.3)
MCHC RBC AUTO-ENTMCNC: 34.7 G/DL (ref 31.4–37.4)
MCV RBC AUTO: 90 FL (ref 82–98)
P AXIS: 49 DEGREES
PLATELET # BLD AUTO: 232 THOUSANDS/UL (ref 149–390)
PMV BLD AUTO: 9.7 FL (ref 8.9–12.7)
POTASSIUM SERPL-SCNC: 4.1 MMOL/L (ref 3.5–5.3)
PR INTERVAL: 182 MS
PROTHROMBIN TIME: 13.4 SECONDS (ref 12.1–14.4)
QRS AXIS: 20 DEGREES
QRSD INTERVAL: 92 MS
QT INTERVAL: 438 MS
QTC INTERVAL: 426 MS
RBC # BLD AUTO: 4.41 MILLION/UL (ref 3.88–5.62)
SODIUM SERPL-SCNC: 140 MMOL/L (ref 136–145)
T WAVE AXIS: 26 DEGREES
TRIGL SERPL-MCNC: 110 MG/DL
VENTRICULAR RATE: 57 BPM
WBC # BLD AUTO: 7.16 THOUSAND/UL (ref 4.31–10.16)

## 2017-08-21 PROCEDURE — C1894 INTRO/SHEATH, NON-LASER: HCPCS | Performed by: INTERNAL MEDICINE

## 2017-08-21 PROCEDURE — 99152 MOD SED SAME PHYS/QHP 5/>YRS: CPT | Performed by: INTERNAL MEDICINE

## 2017-08-21 PROCEDURE — 85610 PROTHROMBIN TIME: CPT | Performed by: INTERNAL MEDICINE

## 2017-08-21 PROCEDURE — 93005 ELECTROCARDIOGRAM TRACING: CPT | Performed by: INTERNAL MEDICINE

## 2017-08-21 PROCEDURE — 80048 BASIC METABOLIC PNL TOTAL CA: CPT | Performed by: INTERNAL MEDICINE

## 2017-08-21 PROCEDURE — 80061 LIPID PANEL: CPT | Performed by: INTERNAL MEDICINE

## 2017-08-21 PROCEDURE — 93454 CORONARY ARTERY ANGIO S&I: CPT | Performed by: INTERNAL MEDICINE

## 2017-08-21 PROCEDURE — C1769 GUIDE WIRE: HCPCS | Performed by: INTERNAL MEDICINE

## 2017-08-21 PROCEDURE — 99153 MOD SED SAME PHYS/QHP EA: CPT | Performed by: INTERNAL MEDICINE

## 2017-08-21 PROCEDURE — 83735 ASSAY OF MAGNESIUM: CPT | Performed by: INTERNAL MEDICINE

## 2017-08-21 PROCEDURE — 85027 COMPLETE CBC AUTOMATED: CPT | Performed by: INTERNAL MEDICINE

## 2017-08-21 RX ORDER — MIDAZOLAM HYDROCHLORIDE 1 MG/ML
INJECTION INTRAMUSCULAR; INTRAVENOUS CODE/TRAUMA/SEDATION MEDICATION
Status: COMPLETED | OUTPATIENT
Start: 2017-08-21 | End: 2017-08-21

## 2017-08-21 RX ORDER — HEPARIN SODIUM 1000 [USP'U]/ML
INJECTION, SOLUTION INTRAVENOUS; SUBCUTANEOUS CODE/TRAUMA/SEDATION MEDICATION
Status: COMPLETED | OUTPATIENT
Start: 2017-08-21 | End: 2017-08-21

## 2017-08-21 RX ORDER — NITROGLYCERIN 20 MG/100ML
INJECTION INTRAVENOUS CODE/TRAUMA/SEDATION MEDICATION
Status: COMPLETED | OUTPATIENT
Start: 2017-08-21 | End: 2017-08-21

## 2017-08-21 RX ORDER — VERAPAMIL HYDROCHLORIDE 2.5 MG/ML
INJECTION, SOLUTION INTRAVENOUS CODE/TRAUMA/SEDATION MEDICATION
Status: COMPLETED | OUTPATIENT
Start: 2017-08-21 | End: 2017-08-21

## 2017-08-21 RX ORDER — ATORVASTATIN CALCIUM 40 MG/1
40 TABLET, FILM COATED ORAL DAILY
Qty: 30 TABLET | Refills: 3 | Status: SHIPPED | OUTPATIENT
Start: 2017-08-21 | End: 2018-01-01 | Stop reason: SDUPTHER

## 2017-08-21 RX ORDER — ASPIRIN 81 MG/1
324 TABLET, CHEWABLE ORAL ONCE
Status: COMPLETED | OUTPATIENT
Start: 2017-08-21 | End: 2017-08-21

## 2017-08-21 RX ORDER — SODIUM CHLORIDE 9 MG/ML
125 INJECTION, SOLUTION INTRAVENOUS CONTINUOUS
Status: DISCONTINUED | OUTPATIENT
Start: 2017-08-21 | End: 2017-08-21

## 2017-08-21 RX ORDER — METOPROLOL TARTRATE 50 MG/1
50 TABLET, FILM COATED ORAL EVERY 12 HOURS SCHEDULED
Status: DISCONTINUED | OUTPATIENT
Start: 2017-08-21 | End: 2017-08-21 | Stop reason: HOSPADM

## 2017-08-21 RX ORDER — MONTELUKAST SODIUM 10 MG/1
10 TABLET ORAL
Status: DISCONTINUED | OUTPATIENT
Start: 2017-08-21 | End: 2017-08-21 | Stop reason: HOSPADM

## 2017-08-21 RX ORDER — LISINOPRIL 20 MG/1
20 TABLET ORAL DAILY
Status: DISCONTINUED | OUTPATIENT
Start: 2017-08-21 | End: 2017-08-21 | Stop reason: HOSPADM

## 2017-08-21 RX ORDER — ATORVASTATIN CALCIUM 20 MG/1
20 TABLET, FILM COATED ORAL DAILY
Status: DISCONTINUED | OUTPATIENT
Start: 2017-08-21 | End: 2017-08-21 | Stop reason: HOSPADM

## 2017-08-21 RX ORDER — NITROGLYCERIN 0.4 MG/1
0.4 TABLET SUBLINGUAL
Status: DISCONTINUED | OUTPATIENT
Start: 2017-08-21 | End: 2017-08-21 | Stop reason: HOSPADM

## 2017-08-21 RX ORDER — LIDOCAINE HYDROCHLORIDE 10 MG/ML
INJECTION, SOLUTION INFILTRATION; PERINEURAL CODE/TRAUMA/SEDATION MEDICATION
Status: COMPLETED | OUTPATIENT
Start: 2017-08-21 | End: 2017-08-21

## 2017-08-21 RX ORDER — SODIUM CHLORIDE 9 MG/ML
150 INJECTION, SOLUTION INTRAVENOUS CONTINUOUS
Status: DISCONTINUED | OUTPATIENT
Start: 2017-08-21 | End: 2017-08-21 | Stop reason: HOSPADM

## 2017-08-21 RX ORDER — FENTANYL CITRATE 50 UG/ML
INJECTION, SOLUTION INTRAMUSCULAR; INTRAVENOUS CODE/TRAUMA/SEDATION MEDICATION
Status: COMPLETED | OUTPATIENT
Start: 2017-08-21 | End: 2017-08-21

## 2017-08-21 RX ADMIN — IOHEXOL 60 ML: 350 INJECTION, SOLUTION INTRAVENOUS at 09:55

## 2017-08-21 RX ADMIN — ASPIRIN 81 MG 243 MG: 81 TABLET ORAL at 07:50

## 2017-08-21 RX ADMIN — SODIUM CHLORIDE 125 ML/HR: 0.9 INJECTION, SOLUTION INTRAVENOUS at 07:49

## 2017-08-21 RX ADMIN — NITROGLYCERIN 200 MCG: 20 INJECTION INTRAVENOUS at 09:37

## 2017-08-21 RX ADMIN — FENTANYL CITRATE 50 MCG: 50 INJECTION, SOLUTION INTRAMUSCULAR; INTRAVENOUS at 09:43

## 2017-08-21 RX ADMIN — VERAPAMIL HYDROCHLORIDE 2.5 MG: 2.5 INJECTION, SOLUTION INTRAVENOUS at 09:37

## 2017-08-21 RX ADMIN — LIDOCAINE HYDROCHLORIDE 1 ML: 10 INJECTION, SOLUTION INFILTRATION; PERINEURAL at 09:35

## 2017-08-21 RX ADMIN — FENTANYL CITRATE 50 MCG: 50 INJECTION, SOLUTION INTRAMUSCULAR; INTRAVENOUS at 09:33

## 2017-08-21 RX ADMIN — MIDAZOLAM 2 MG: 1 INJECTION INTRAMUSCULAR; INTRAVENOUS at 09:33

## 2017-08-21 RX ADMIN — HEPARIN SODIUM 4000 UNITS: 1000 INJECTION INTRAVENOUS; SUBCUTANEOUS at 09:37

## 2017-08-21 RX ADMIN — MIDAZOLAM 1 MG: 1 INJECTION INTRAMUSCULAR; INTRAVENOUS at 09:43

## 2017-08-28 ENCOUNTER — GENERIC CONVERSION - ENCOUNTER (OUTPATIENT)
Dept: OTHER | Facility: OTHER | Age: 82
End: 2017-08-28

## 2017-10-17 ENCOUNTER — GENERIC CONVERSION - ENCOUNTER (OUTPATIENT)
Dept: OTHER | Facility: OTHER | Age: 82
End: 2017-10-17

## 2017-10-18 ENCOUNTER — ALLSCRIPTS OFFICE VISIT (OUTPATIENT)
Dept: OTHER | Facility: OTHER | Age: 82
End: 2017-10-18

## 2017-10-18 DIAGNOSIS — I10 ESSENTIAL (PRIMARY) HYPERTENSION: ICD-10-CM
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
## 2017-11-01 ENCOUNTER — GENERIC CONVERSION - ENCOUNTER (OUTPATIENT)
Dept: OTHER | Facility: OTHER | Age: 82
End: 2017-11-01

## 2017-11-08 ENCOUNTER — TRANSCRIBE ORDERS (OUTPATIENT)
Dept: ADMINISTRATIVE | Facility: HOSPITAL | Age: 82
End: 2017-11-08

## 2017-11-08 ENCOUNTER — APPOINTMENT (OUTPATIENT)
Dept: LAB | Facility: HOSPITAL | Age: 82
End: 2017-11-08
Attending: INTERNAL MEDICINE
Payer: MEDICARE

## 2017-11-08 DIAGNOSIS — I10 ESSENTIAL (PRIMARY) HYPERTENSION: ICD-10-CM

## 2017-11-08 LAB
ALBUMIN SERPL BCP-MCNC: 3.6 G/DL (ref 3.5–5)
ALP SERPL-CCNC: 77 U/L (ref 46–116)
ALT SERPL W P-5'-P-CCNC: 31 U/L (ref 12–78)
AST SERPL W P-5'-P-CCNC: 11 U/L (ref 5–45)
BILIRUB DIRECT SERPL-MCNC: 0.15 MG/DL (ref 0–0.2)
BILIRUB SERPL-MCNC: 0.5 MG/DL (ref 0.2–1)
CHOLEST SERPL-MCNC: 114 MG/DL (ref 50–200)
HDLC SERPL-MCNC: 49 MG/DL (ref 40–60)
LDLC SERPL CALC-MCNC: 46 MG/DL (ref 0–100)
PROT SERPL-MCNC: 7.3 G/DL (ref 6.4–8.2)
TRIGL SERPL-MCNC: 95 MG/DL

## 2017-11-08 PROCEDURE — 80061 LIPID PANEL: CPT

## 2017-11-08 PROCEDURE — 80076 HEPATIC FUNCTION PANEL: CPT

## 2017-11-08 PROCEDURE — 36415 COLL VENOUS BLD VENIPUNCTURE: CPT

## 2017-11-28 ENCOUNTER — HOSPITAL ENCOUNTER (EMERGENCY)
Facility: HOSPITAL | Age: 82
Discharge: HOME | End: 2017-11-28
Attending: EMERGENCY MEDICINE | Admitting: EMERGENCY MEDICINE
Payer: MEDICARE

## 2017-11-28 ENCOUNTER — APPOINTMENT (EMERGENCY)
Dept: RADIOLOGY | Facility: HOSPITAL | Age: 82
End: 2017-11-28
Payer: MEDICARE

## 2017-11-28 VITALS
HEART RATE: 82 BPM | OXYGEN SATURATION: 97 % | BODY MASS INDEX: 25.76 KG/M2 | SYSTOLIC BLOOD PRESSURE: 119 MMHG | RESPIRATION RATE: 18 BRPM | HEIGHT: 68 IN | DIASTOLIC BLOOD PRESSURE: 56 MMHG | TEMPERATURE: 97.1 F | WEIGHT: 170 LBS

## 2017-11-28 DIAGNOSIS — S20.211A RIB CONTUSION, RIGHT, INITIAL ENCOUNTER: ICD-10-CM

## 2017-11-28 DIAGNOSIS — S63.502A LEFT WRIST SPRAIN: ICD-10-CM

## 2017-11-28 DIAGNOSIS — S63.501A RIGHT WRIST SPRAIN: Primary | ICD-10-CM

## 2017-11-28 PROCEDURE — 73110 X-RAY EXAM OF WRIST: CPT

## 2017-11-28 PROCEDURE — 99283 EMERGENCY DEPT VISIT LOW MDM: CPT

## 2017-11-28 RX ORDER — HYDROCODONE BITARTRATE AND ACETAMINOPHEN 5; 325 MG/1; MG/1
1 TABLET ORAL EVERY 4 HOURS PRN
Qty: 12 TABLET | Refills: 0 | Status: SHIPPED | OUTPATIENT
Start: 2017-11-28 | End: 2018-02-27 | Stop reason: CLARIF

## 2017-11-28 NOTE — DISCHARGE INSTRUCTIONS
Rib Contusion   WHAT YOU NEED TO KNOW:   A rib contusion is a bruise on one or more of your ribs  DISCHARGE INSTRUCTIONS:   Return to the emergency department if:   · You have increased chest pain  · You have shortness of breath  · You start to cough up blood  · Your pain does not improve with pain medicine  Contact your healthcare provider if:   · You have a cough  · You have a fever  · You have questions or concerns about your condition or care  Medicines: You may need any of the following:  · NSAIDs , such as ibuprofen, help decrease swelling, pain, and fever  This medicine is available with or without a doctor's order  NSAIDs can cause stomach bleeding or kidney problems in certain people  If you take blood thinner medicine, always ask if NSAIDs are safe for you  Always read the medicine label and follow directions  Do not give these medicines to children under 10months of age without direction from your child's healthcare provider  · Prescription pain medicine  may be given  Ask how to take this medicine safely  · Take your medicine as directed  Contact your healthcare provider if you think your medicine is not helping or if you have side effects  Tell him of her if you are allergic to any medicine  Keep a list of the medicines, vitamins, and herbs you take  Include the amounts, and when and why you take them  Bring the list or the pill bottles to follow-up visits  Carry your medicine list with you in case of an emergency  Deep breathing:   · To help prevent pneumonia, take 10 deep breaths every hour, even when you wake up during the night  Brace your ribs with your hands or a pillow while you take deep breaths or cough  This will help decrease your pain  · You may need to use an incentive spirometer to help you take deeper breaths  Put the plastic piece into your mouth and take a very deep breath  Hold your breath as long as you can  Then let out your breath   Do this 10 times in a row every hour while you are awake  Rest:  Rest your ribs to decrease swelling and allow the injury to heal faster  Avoid activities that may cause more pain or damage to your ribs  As your pain decreases, begin movements slowly  Ice:  Ice helps decrease swelling and pain  Ice may also help prevent tissue damage  Use an ice pack or put crushed ice in a plastic bag  Cover it with a towel and place it on your bruised area for 15 to 20 minutes every hour as directed  Follow up with your healthcare provider as directed:  Write down your questions so you remember to ask them during your visits  © 2017 [address] Information is for End User's use only and may not be sold, redistributed or otherwise used for commercial purposes  All illustrations and images included in CareNotes® are the copyrighted property of [employer]  or [address]  The above information is an  only  It is not intended as medical advice for individual conditions or treatments  Talk to your doctor, nurse or pharmacist before following any medical regimen to see if it is safe and effective for you  Wrist Sprain   WHAT YOU NEED TO KNOW:   A wrist sprain happens when one or more ligaments in your wrist stretch or tear  Ligaments are tough tissues that connect bones and keep them in place, and support your joints  DISCHARGE INSTRUCTIONS:   Return to the emergency department if:   · You have severe pain or swelling  · Your injured wrist is red or has red streaks spreading from the injured area  · You have new trouble moving your hands, fingers, or wrist     · Your wrist, hand, or fingers feel cold or numb  · Your fingernails turn blue or gray  Contact your healthcare provider if:   · Your symptoms get worse  · You have pain and swelling for more than 48 hours  · You have questions or concerns about your condition or care    Medicines:   · NSAIDs , such as ibuprofen, help decrease swelling, pain, and fever  NSAIDs can cause stomach bleeding or kidney problems in certain people  If you take blood thinner medicine, always ask your healthcare provider if NSAIDs are safe for you  Always read the medicine label and follow directions  · Acetaminophen  decreases pain and fever  It is available without a doctor's order  Ask how much to take and how often to take it  Follow directions  Read the labels of all other medicines you are using to see if they also contain acetaminophen, or ask your doctor or pharmacist  Acetaminophen can cause liver damage if not taken correctly  Do not use more than 4 grams (4,000 milligrams) total of acetaminophen in one day  · Take your medicine as directed  Contact your healthcare provider if you think your medicine is not helping or if you have side effects  Tell him or her if you are allergic to any medicine  Keep a list of the medicines, vitamins, and herbs you take  Include the amounts, and when and why you take them  Bring the list or the pill bottles to follow-up visits  Carry your medicine list with you in case of an emergency  Self-care:   · Rest  your wrist for at least 48 hours  Avoid activities that cause pain  · Ice  your wrist for 15 to 20 minutes every hour or as directed  Use an ice pack, or put crushed ice in a plastic bag  Cover it with a towel before you put it on your wrist  Ice helps prevent tissue damage and decreases swelling and pain  · Compress  your wrist with an elastic bandage  This will help decrease swelling, support your wrist, and help it heal  Wear your wrist wrap as directed  The elastic bandage should be snug but not tight  · Elevate  your wrist above the level of your heart as often as you can  This will help decrease swelling and pain  Prop your wrist on pillows or blankets to keep it elevated comfortably  Wrist support: You may need to wear a splint or cast to support your wrist and prevent more damage  Wear your splint as directed  Ask for instructions on how to bathe while you are wearing a splint or cast    Physical therapy:  Your healthcare provider may recommend that you go to physical therapy  A physical therapist teaches you exercises to help improve movement and strength, and to decrease pain  Follow up with your healthcare provider as directed:  Write down your questions so you remember to ask them during your visits  © 2017 [facility] [facility] Information is for End User's use only and may not be sold, redistributed or otherwise used for commercial purposes  All illustrations and images included in CareNotes® are the copyrighted property of [employer]  or Patrice Yancey  The above information is an  only  It is not intended as medical advice for individual conditions or treatments  Talk to your doctor, nurse or pharmacist before following any medical regimen to see if it is safe and effective for you

## 2017-11-28 NOTE — ED PROVIDER NOTES
H&P Exam - Trauma   Mirela John 80 y o  male MRN: [id number]  Unit/Bed#: ED 10/ED 10-01 Encounter: [id number]    Assessment/Plan   Trauma Alert: Trauma Acuity: Trauma Evaluation  Model of Arrival: Trauma Mode of Arrival: Other (Comment) (Private vehicle) via    Trauma Team: Current Providers  Attending Provider: Ford Chacon DO  Registered Nurse: Shanelle Nielson RN  Consultants: None    Trauma Active Problems: b/l wrist pain and right rib pain    Trauma Plan: xray b/l wrists and right ribs    Chief Complaint:   Chief Complaint   Patient presents with    Fall     Patient states he tripped this am and fell, he braced himself with his wrists  Patient complains of pain in bilateral wrists and R rib cage  Pt denies any hitting his head or LOC  Pt doesn't knwo what he hit his rib on       History of Present Illness   HPI:  Mirela John is a 80 y o  male who presents with trip and fall    Mechanism:Details of Incident: Trip and fall forward - tried to catch himself with both hands extended          81y M, RHD, [address] onto b/l hands earlier today - tripped and caught himself falling  Thinks his right elbow jammed into his ribs on the right side  Having persistent pain in b/l wrists and the right side of the ribs  Didn't hit head, no LOC  Denies sob  No abd pain, no lower extremity pain  No other co        History provided by:  Patient   used: No    Fall   Mechanism of injury: fall    Injury location:  Shoulder/arm and torso  Shoulder/arm injury location:  L wrist and R wrist  Torso injury location:  R chest  Incident location:  Home  Arrived directly from scene: no    Fall:     Fall occurred:  Tripped    Height of fall:  From standing    Impact surface:  Hard floor    Point of impact:  Outstretched arms and hands    Entrapped after fall: no    Protective equipment: none    Suspicion of alcohol use: no    Suspicion of drug use: no    Tetanus status: n/a    Prior to arrival data: Bystander interventions:  None    Patient ambulatory at scene: yes      Blood loss:  None    Responsiveness at scene:  Alert    Orientation at scene:  Person    Loss of consciousness: no      Amnesic to event: no      Immobilization:  None  Associated symptoms: no abdominal pain, no back pain, no chest pain, no difficulty breathing, no headaches, no loss of consciousness, no nausea, no neck pain and no vomiting    Risk factors: CAD    Risk factors: no anticoagulation therapy      Review of Systems   Cardiovascular: Negative for chest pain  Gastrointestinal: Negative for abdominal pain, nausea and vomiting  Musculoskeletal: Negative for back pain and neck pain  Neurological: Negative for loss of consciousness and headaches  All other systems reviewed and are negative  Historical Information     Immunizations: There is no immunization history on file for this patient  Past Medical History:   Diagnosis Date    Cardiac disease     Chronic pain     Depression     Hyperlipidemia     Hypertension     Thoracic aneurysm without mention of rupture      History reviewed  No pertinent family history  Past Surgical History:   Procedure Laterality Date    CORONARY ANGIOPLASTY WITH STENT PLACEMENT      JOINT REPLACEMENT      hip       Social History     Social History    Marital status:      Spouse name: N/A    Number of children: N/A    Years of education: N/A     Social History Main Topics    Smoking status: Former Smoker     Types: Cigars    Smokeless tobacco: Never Used      Comment: 1 cigar daily    Alcohol use No    Drug use: No    Sexual activity: Not Asked     Other Topics Concern    None     Social History Narrative    None       Family History: non-contributory    Meds/Allergies   Prior to Admission Medications   Prescriptions Last Dose Informant Patient Reported? Taking?    HYDROcodone-acetaminophen (NORCO) 5-325 mg per tablet  Self Yes No   Sig: Take 1 tablet by mouth every 6 (six) hours as needed for pain   Polyethylene Glycol 3350 (MIRALAX PO)   Yes No   Sig: Take 1 Dose by mouth daily   aspirin (ECOTRIN LOW STRENGTH) 81 mg EC tablet  Self Yes No   Sig: Take 81 mg by mouth daily   atorvastatin (LIPITOR) 40 mg tablet   No No   Sig: Take 1 tablet by mouth daily   clopidogrel (PLAVIX) 75 mg tablet  Self Yes No   Sig: Take 75 mg by mouth daily   gabapentin (NEURONTIN) 300 mg capsule  Self Yes No   Sig: Take 300 mg by mouth 3 (three) times a day   lisinopril (ZESTRIL) 20 mg tablet  Self Yes No   Sig: Take 20 mg by mouth daily   metoprolol tartrate (LOPRESSOR) 50 mg tablet  Self Yes No   Sig: Take 50 mg by mouth 2 (two) times a day   montelukast (SINGULAIR) 10 mg tablet  Self Yes No   Sig: Take 10 mg by mouth daily at bedtime   nitroglycerin (NITROSTAT) 0 4 mg SL tablet  Self Yes No   Sig: Place 0 4 mg under the tongue every 5 (five) minutes as needed for chest pain   tamsulosin (FLOMAX) 0 4 mg  Self Yes No   Sig: Take 0 4 mg by mouth daily with dinner      Facility-Administered Medications: None       Allergies   Allergen Reactions    Aspirin GI Intolerance    Augmentin [Amoxicillin-Pot Clavulanate]        PHYSICAL EXAM    PE limited by: none    Objective   Vitals:   First set: Temperature: (!) 97 1 °F (36 2 °C) (11/28/17 1444)  Pulse: 90 (11/28/17 1444)  Respirations: 20 (11/28/17 1444)  Blood Pressure: 125/65 (11/28/17 1444)  SpO2: 99 % (11/28/17 1444)    Primary Survey:   (A) Airway: intact  (B) Breathing: clear  (C) Circulation: Pulses:   normal  (D) Disabliity:  GCS Total:  15  (E) Expose:  Completed    Secondary Survey: (Click on Physical Exam tab above)  Physical Exam   Constitutional: He is oriented to person, place, and time  He appears well-developed and well-nourished  HENT:   Head: Normocephalic and atraumatic     Right Ear: External ear normal    Left Ear: External ear normal    Nose: Nose normal    Mouth/Throat: Oropharynx is clear and moist    Eyes: Conjunctivae are normal  Pupils are equal, round, and reactive to light  Neck: Neck supple  No spinous process tenderness present  Cardiovascular: Normal rate and regular rhythm  Pulmonary/Chest: Effort normal    Abdominal: Soft  Bowel sounds are normal    Musculoskeletal:        Right wrist: He exhibits decreased range of motion, tenderness and bony tenderness  He exhibits no swelling, no effusion, no crepitus and no deformity  Left wrist: He exhibits decreased range of motion, tenderness and bony tenderness  He exhibits no swelling, no effusion, no crepitus and no deformity  Hands:  Neurological: He is alert and oriented to person, place, and time  Skin: Skin is warm and dry  Psychiatric: He has a normal mood and affect  Nursing note and vitals reviewed  Invasive Devices          No matching active lines, drains, or airways          Lab Results:   Results Reviewed     None                 Imaging Studies:   XR wrist 3+ views RIGHT   ED Interpretation by Cynthia Payan DO (11/28 1538)   neg      Final Result by Tiny Awad DO (11/28 9959)      No acute osseous abnormality           Workstation performed: [id number]         XR wrist 3+ views LEFT   ED Interpretation by Cynthia Payan DO (11/28 1311)   neg      Final Result by Ginger Beyer MD (11/28 1523)      Severe degenerative arthritis at the 1st carpometacarpal joint         Workstation performed: [id number]             Other Studies: none    Code Status: Prior  Advance Directive and Living Will:      Power of :    POLST:      Procedures  Procedures       Phone Contacts  ED Phone Contact     ED Course  ED Course          MDM  Number of Diagnoses or Management Options  Left wrist sprain: new and requires workup  Rib contusion, right, initial encounter: new and requires workup  Right wrist sprain: new and requires workup     Amount and/or Complexity of Data Reviewed  Tests in the radiology section of CPT®: ordered and reviewed  Independent visualization of images, tracings, or specimens: yes      CritCare Time    Disposition  Final diagnoses:   Right wrist sprain   Left wrist sprain   Rib contusion, right, initial encounter     Time reflects when diagnosis was documented in both MDM as applicable and the Disposition within this note     Time User Action Codes Description Comment    11/28/2017  3:39 PM Jas David [[id number] [id number]] Right wrist sprain     11/28/2017  3:39 PM David Christian Left wrist sprain     11/28/2017  3:40 PM [address] Rib contusion, right, initial encounter       ED Disposition     ED Disposition Condition Comment    Discharge  Mirela oDra discharge to home/self care  Condition at discharge: Good        Follow-up Information     Follow up With Specialties Details Why Contact Info    Justino Farrar MD Internal Medicine In 3 days If symptoms worsen [facility]  [address] [address] [phone]          Patient's Medications   Discharge Prescriptions    HYDROCODONE-ACETAMINOPHEN (NORCO) 5-325 MG PER TABLET    Take 1 tablet by mouth every 4 (four) hours as needed for pain Max Daily Amount: 6 tablets       Start Date: 11/28/2017End Date: --       Order Dose: 1 tablet       Quantity: 12 tablet    Refills: 0     No discharge procedures on file        ED Provider  Electronically Signed by         Ford Chacon DO  11/28/17 5643

## 2017-12-05 ENCOUNTER — GENERIC CONVERSION - ENCOUNTER (OUTPATIENT)
Dept: FAMILY MEDICINE CLINIC | Facility: HOSPITAL | Age: 82
End: 2017-12-05

## 2017-12-15 ENCOUNTER — GENERIC CONVERSION - ENCOUNTER (OUTPATIENT)
Dept: OTHER | Facility: OTHER | Age: 82
End: 2017-12-15

## 2017-12-15 ENCOUNTER — GENERIC CONVERSION - ENCOUNTER (OUTPATIENT)
Dept: FAMILY MEDICINE CLINIC | Facility: HOSPITAL | Age: 82
End: 2017-12-15

## 2018-01-01 ENCOUNTER — TELEPHONE (OUTPATIENT)
Dept: FAMILY MEDICINE CLINIC | Facility: HOSPITAL | Age: 83
End: 2018-01-01

## 2018-01-01 ENCOUNTER — TELEPHONE (OUTPATIENT)
Dept: OTHER | Facility: OTHER | Age: 83
End: 2018-01-01

## 2018-01-01 ENCOUNTER — APPOINTMENT (OUTPATIENT)
Dept: PHYSICAL THERAPY | Facility: CLINIC | Age: 83
End: 2018-01-01
Payer: MEDICARE

## 2018-01-01 ENCOUNTER — TRANSITIONAL CARE MANAGEMENT (OUTPATIENT)
Dept: FAMILY MEDICINE CLINIC | Facility: HOSPITAL | Age: 83
End: 2018-01-01

## 2018-01-01 ENCOUNTER — OFFICE VISIT (OUTPATIENT)
Dept: FAMILY MEDICINE CLINIC | Facility: HOSPITAL | Age: 83
End: 2018-01-01
Payer: MEDICARE

## 2018-01-01 ENCOUNTER — TELEPHONE (OUTPATIENT)
Dept: CARDIOLOGY CLINIC | Facility: CLINIC | Age: 83
End: 2018-01-01

## 2018-01-01 ENCOUNTER — HOSPITAL ENCOUNTER (INPATIENT)
Facility: HOSPITAL | Age: 83
LOS: 2 days | Discharge: HOME HEALTH | DRG: 700 | End: 2018-12-29
Attending: EMERGENCY MEDICINE | Admitting: UROLOGY
Payer: MEDICARE

## 2018-01-01 ENCOUNTER — APPOINTMENT (EMERGENCY)
Dept: CT IMAGING | Facility: HOSPITAL | Age: 83
DRG: 700 | End: 2018-01-01
Payer: MEDICARE

## 2018-01-01 VITALS
OXYGEN SATURATION: 97 % | HEIGHT: 67 IN | RESPIRATION RATE: 18 BRPM | WEIGHT: 147.71 LBS | SYSTOLIC BLOOD PRESSURE: 134 MMHG | TEMPERATURE: 98.4 F | BODY MASS INDEX: 23.18 KG/M2 | DIASTOLIC BLOOD PRESSURE: 62 MMHG | HEART RATE: 78 BPM

## 2018-01-01 VITALS
BODY MASS INDEX: 25.01 KG/M2 | HEART RATE: 68 BPM | TEMPERATURE: 99.9 F | RESPIRATION RATE: 16 BRPM | DIASTOLIC BLOOD PRESSURE: 66 MMHG | SYSTOLIC BLOOD PRESSURE: 112 MMHG | WEIGHT: 165 LBS | HEIGHT: 68 IN

## 2018-01-01 DIAGNOSIS — I25.118 CORONARY ARTERY DISEASE OF NATIVE ARTERY OF NATIVE HEART WITH STABLE ANGINA PECTORIS (HCC): Primary | ICD-10-CM

## 2018-01-01 DIAGNOSIS — I10 ESSENTIAL HYPERTENSION: ICD-10-CM

## 2018-01-01 DIAGNOSIS — R31.9 HEMATURIA: Primary | ICD-10-CM

## 2018-01-01 DIAGNOSIS — I25.118 CORONARY ARTERY DISEASE OF NATIVE ARTERY OF NATIVE HEART WITH STABLE ANGINA PECTORIS (HCC): ICD-10-CM

## 2018-01-01 DIAGNOSIS — J06.9 UPPER RESPIRATORY TRACT INFECTION, UNSPECIFIED TYPE: Primary | ICD-10-CM

## 2018-01-01 DIAGNOSIS — M54.42 ACUTE LEFT-SIDED LOW BACK PAIN WITH LEFT-SIDED SCIATICA: ICD-10-CM

## 2018-01-01 LAB
ALBUMIN SERPL BCP-MCNC: 3.4 G/DL (ref 3.5–5)
ALP SERPL-CCNC: 71 U/L (ref 46–116)
ALT SERPL W P-5'-P-CCNC: 24 U/L (ref 12–78)
ANION GAP SERPL CALCULATED.3IONS-SCNC: 7 MMOL/L (ref 4–13)
ANION GAP SERPL CALCULATED.3IONS-SCNC: 9 MMOL/L (ref 4–13)
APTT PPP: 31 SECONDS (ref 26–38)
AST SERPL W P-5'-P-CCNC: 13 U/L (ref 5–45)
BACTERIA UR CULT: NORMAL
BACTERIA UR QL AUTO: ABNORMAL /HPF
BASOPHILS # BLD AUTO: 0.04 THOUSANDS/ΜL (ref 0–0.1)
BASOPHILS # BLD AUTO: 0.06 THOUSANDS/ΜL (ref 0–0.1)
BASOPHILS NFR BLD AUTO: 0 % (ref 0–1)
BASOPHILS NFR BLD AUTO: 1 % (ref 0–1)
BILIRUB SERPL-MCNC: 0.5 MG/DL (ref 0.2–1)
BILIRUB UR QL STRIP: ABNORMAL
BUN SERPL-MCNC: 18 MG/DL (ref 5–25)
BUN SERPL-MCNC: 22 MG/DL (ref 5–25)
CALCIUM SERPL-MCNC: 8.3 MG/DL (ref 8.3–10.1)
CALCIUM SERPL-MCNC: 8.5 MG/DL (ref 8.3–10.1)
CHLORIDE SERPL-SCNC: 104 MMOL/L (ref 100–108)
CHLORIDE SERPL-SCNC: 105 MMOL/L (ref 100–108)
CLARITY UR: ABNORMAL
CO2 SERPL-SCNC: 26 MMOL/L (ref 21–32)
CO2 SERPL-SCNC: 29 MMOL/L (ref 21–32)
COLOR UR: ABNORMAL
CREAT SERPL-MCNC: 0.88 MG/DL (ref 0.6–1.3)
CREAT SERPL-MCNC: 1.19 MG/DL (ref 0.6–1.3)
EOSINOPHIL # BLD AUTO: 0.28 THOUSAND/ΜL (ref 0–0.61)
EOSINOPHIL # BLD AUTO: 0.35 THOUSAND/ΜL (ref 0–0.61)
EOSINOPHIL NFR BLD AUTO: 3 % (ref 0–6)
EOSINOPHIL NFR BLD AUTO: 5 % (ref 0–6)
ERYTHROCYTE [DISTWIDTH] IN BLOOD BY AUTOMATED COUNT: 11.9 % (ref 11.6–15.1)
ERYTHROCYTE [DISTWIDTH] IN BLOOD BY AUTOMATED COUNT: 11.9 % (ref 11.6–15.1)
ERYTHROCYTE [DISTWIDTH] IN BLOOD BY AUTOMATED COUNT: 12.5 % (ref 11.6–15.1)
GFR SERPL CREATININE-BSD FRML MDRD: 56 ML/MIN/1.73SQ M
GFR SERPL CREATININE-BSD FRML MDRD: 79 ML/MIN/1.73SQ M
GLUCOSE SERPL-MCNC: 109 MG/DL (ref 65–140)
GLUCOSE SERPL-MCNC: 138 MG/DL (ref 65–140)
GLUCOSE UR STRIP-MCNC: ABNORMAL MG/DL
HCT VFR BLD AUTO: 31.9 % (ref 36.5–49.3)
HCT VFR BLD AUTO: 34.5 % (ref 36.5–49.3)
HCT VFR BLD AUTO: 35.2 % (ref 36.5–49.3)
HCT VFR BLD AUTO: 36 % (ref 36.5–49.3)
HCT VFR BLD AUTO: 37.1 % (ref 36.5–49.3)
HCT VFR BLD AUTO: 40.7 % (ref 36.5–49.3)
HGB BLD-MCNC: 10.5 G/DL (ref 12–17)
HGB BLD-MCNC: 11.6 G/DL (ref 12–17)
HGB BLD-MCNC: 11.9 G/DL (ref 12–17)
HGB BLD-MCNC: 12 G/DL (ref 12–17)
HGB BLD-MCNC: 12.4 G/DL (ref 12–17)
HGB BLD-MCNC: 13.6 G/DL (ref 12–17)
HGB UR QL STRIP.AUTO: ABNORMAL
IMM GRANULOCYTES # BLD AUTO: 0.02 THOUSAND/UL (ref 0–0.2)
IMM GRANULOCYTES # BLD AUTO: 0.05 THOUSAND/UL (ref 0–0.2)
IMM GRANULOCYTES NFR BLD AUTO: 0 % (ref 0–2)
IMM GRANULOCYTES NFR BLD AUTO: 1 % (ref 0–2)
INR PPP: 1 (ref 0.86–1.17)
KETONES UR STRIP-MCNC: NEGATIVE MG/DL
LEUKOCYTE ESTERASE UR QL STRIP: NEGATIVE
LYMPHOCYTES # BLD AUTO: 1.38 THOUSANDS/ΜL (ref 0.6–4.47)
LYMPHOCYTES # BLD AUTO: 1.38 THOUSANDS/ΜL (ref 0.6–4.47)
LYMPHOCYTES NFR BLD AUTO: 13 % (ref 14–44)
LYMPHOCYTES NFR BLD AUTO: 21 % (ref 14–44)
MCH RBC QN AUTO: 31.1 PG (ref 26.8–34.3)
MCH RBC QN AUTO: 31.1 PG (ref 26.8–34.3)
MCH RBC QN AUTO: 31.7 PG (ref 26.8–34.3)
MCHC RBC AUTO-ENTMCNC: 32.9 G/DL (ref 31.4–37.4)
MCHC RBC AUTO-ENTMCNC: 33.4 G/DL (ref 31.4–37.4)
MCHC RBC AUTO-ENTMCNC: 34.1 G/DL (ref 31.4–37.4)
MCV RBC AUTO: 93 FL (ref 82–98)
MCV RBC AUTO: 93 FL (ref 82–98)
MCV RBC AUTO: 94 FL (ref 82–98)
MONOCYTES # BLD AUTO: 0.53 THOUSAND/ΜL (ref 0.17–1.22)
MONOCYTES # BLD AUTO: 1.05 THOUSAND/ΜL (ref 0.17–1.22)
MONOCYTES NFR BLD AUTO: 10 % (ref 4–12)
MONOCYTES NFR BLD AUTO: 8 % (ref 4–12)
NEUTROPHILS # BLD AUTO: 4.23 THOUSANDS/ΜL (ref 1.85–7.62)
NEUTROPHILS # BLD AUTO: 8.1 THOUSANDS/ΜL (ref 1.85–7.62)
NEUTS SEG NFR BLD AUTO: 65 % (ref 43–75)
NEUTS SEG NFR BLD AUTO: 73 % (ref 43–75)
NITRITE UR QL STRIP: POSITIVE
NON-SQ EPI CELLS URNS QL MICRO: ABNORMAL /HPF
NRBC BLD AUTO-RTO: 0 /100 WBCS
PH UR STRIP.AUTO: 7 [PH] (ref 4.5–8)
PLATELET # BLD AUTO: 235 THOUSANDS/UL (ref 149–390)
PLATELET # BLD AUTO: 244 THOUSANDS/UL (ref 149–390)
PLATELET # BLD AUTO: 261 THOUSANDS/UL (ref 149–390)
PLATELET # BLD AUTO: 262 THOUSANDS/UL (ref 149–390)
PMV BLD AUTO: 9.7 FL (ref 8.9–12.7)
PMV BLD AUTO: 9.7 FL (ref 8.9–12.7)
PMV BLD AUTO: 9.9 FL (ref 8.9–12.7)
PMV BLD AUTO: 9.9 FL (ref 8.9–12.7)
POTASSIUM SERPL-SCNC: 3.6 MMOL/L (ref 3.5–5.3)
POTASSIUM SERPL-SCNC: 3.8 MMOL/L (ref 3.5–5.3)
PROT SERPL-MCNC: 7.7 G/DL (ref 6.4–8.2)
PROT UR STRIP-MCNC: ABNORMAL MG/DL
PROTHROMBIN TIME: 12.6 SECONDS (ref 11.8–14.2)
PSA SERPL-MCNC: 0.7 NG/ML (ref 0–4)
RBC # BLD AUTO: 3.38 MILLION/UL (ref 3.88–5.62)
RBC # BLD AUTO: 3.78 MILLION/UL (ref 3.88–5.62)
RBC # BLD AUTO: 4.37 MILLION/UL (ref 3.88–5.62)
RBC #/AREA URNS AUTO: ABNORMAL /HPF
SODIUM SERPL-SCNC: 140 MMOL/L (ref 136–145)
SODIUM SERPL-SCNC: 140 MMOL/L (ref 136–145)
SP GR UR STRIP.AUTO: 1.02 (ref 1–1.03)
UROBILINOGEN UR QL STRIP.AUTO: 0.2 E.U./DL
WBC # BLD AUTO: 10.9 THOUSAND/UL (ref 4.31–10.16)
WBC # BLD AUTO: 6.57 THOUSAND/UL (ref 4.31–10.16)
WBC # BLD AUTO: 9.62 THOUSAND/UL (ref 4.31–10.16)
WBC #/AREA URNS AUTO: ABNORMAL /HPF

## 2018-01-01 PROCEDURE — 81001 URINALYSIS AUTO W/SCOPE: CPT | Performed by: EMERGENCY MEDICINE

## 2018-01-01 PROCEDURE — 85610 PROTHROMBIN TIME: CPT | Performed by: EMERGENCY MEDICINE

## 2018-01-01 PROCEDURE — 85049 AUTOMATED PLATELET COUNT: CPT | Performed by: UROLOGY

## 2018-01-01 PROCEDURE — 85025 COMPLETE CBC W/AUTO DIFF WBC: CPT | Performed by: EMERGENCY MEDICINE

## 2018-01-01 PROCEDURE — 36415 COLL VENOUS BLD VENIPUNCTURE: CPT | Performed by: EMERGENCY MEDICINE

## 2018-01-01 PROCEDURE — 74176 CT ABD & PELVIS W/O CONTRAST: CPT

## 2018-01-01 PROCEDURE — G0103 PSA SCREENING: HCPCS | Performed by: PHYSICIAN ASSISTANT

## 2018-01-01 PROCEDURE — 99213 OFFICE O/P EST LOW 20 MIN: CPT | Performed by: PHYSICIAN ASSISTANT

## 2018-01-01 PROCEDURE — 99232 SBSQ HOSP IP/OBS MODERATE 35: CPT | Performed by: PHYSICIAN ASSISTANT

## 2018-01-01 PROCEDURE — 99238 HOSP IP/OBS DSCHRG MGMT 30/<: CPT | Performed by: UROLOGY

## 2018-01-01 PROCEDURE — 99222 1ST HOSP IP/OBS MODERATE 55: CPT | Performed by: INTERNAL MEDICINE

## 2018-01-01 PROCEDURE — 3E1K78Z IRRIGATION OF GENITOURINARY TRACT USING IRRIGATING SUBSTANCE, VIA NATURAL OR ARTIFICIAL OPENING: ICD-10-PCS | Performed by: EMERGENCY MEDICINE

## 2018-01-01 PROCEDURE — 85018 HEMOGLOBIN: CPT | Performed by: INTERNAL MEDICINE

## 2018-01-01 PROCEDURE — 99232 SBSQ HOSP IP/OBS MODERATE 35: CPT | Performed by: INTERNAL MEDICINE

## 2018-01-01 PROCEDURE — 80053 COMPREHEN METABOLIC PANEL: CPT | Performed by: EMERGENCY MEDICINE

## 2018-01-01 PROCEDURE — 99222 1ST HOSP IP/OBS MODERATE 55: CPT | Performed by: PHYSICIAN ASSISTANT

## 2018-01-01 PROCEDURE — 85014 HEMATOCRIT: CPT | Performed by: INTERNAL MEDICINE

## 2018-01-01 PROCEDURE — 85025 COMPLETE CBC W/AUTO DIFF WBC: CPT | Performed by: INTERNAL MEDICINE

## 2018-01-01 PROCEDURE — 85027 COMPLETE CBC AUTOMATED: CPT | Performed by: UROLOGY

## 2018-01-01 PROCEDURE — 85730 THROMBOPLASTIN TIME PARTIAL: CPT | Performed by: EMERGENCY MEDICINE

## 2018-01-01 PROCEDURE — 99285 EMERGENCY DEPT VISIT HI MDM: CPT

## 2018-01-01 PROCEDURE — 80048 BASIC METABOLIC PNL TOTAL CA: CPT | Performed by: UROLOGY

## 2018-01-01 PROCEDURE — 96375 TX/PRO/DX INJ NEW DRUG ADDON: CPT

## 2018-01-01 PROCEDURE — 96374 THER/PROPH/DIAG INJ IV PUSH: CPT

## 2018-01-01 PROCEDURE — 87086 URINE CULTURE/COLONY COUNT: CPT | Performed by: EMERGENCY MEDICINE

## 2018-01-01 RX ORDER — LISINOPRIL 20 MG/1
20 TABLET ORAL DAILY
Status: DISCONTINUED | OUTPATIENT
Start: 2018-01-01 | End: 2018-01-01 | Stop reason: HOSPADM

## 2018-01-01 RX ORDER — FENTANYL CITRATE 50 UG/ML
50 INJECTION, SOLUTION INTRAMUSCULAR; INTRAVENOUS ONCE
Status: COMPLETED | OUTPATIENT
Start: 2018-01-01 | End: 2018-01-01

## 2018-01-01 RX ORDER — HYDRALAZINE HYDROCHLORIDE 20 MG/ML
10 INJECTION INTRAMUSCULAR; INTRAVENOUS EVERY 6 HOURS PRN
Status: DISCONTINUED | OUTPATIENT
Start: 2018-01-01 | End: 2018-01-01 | Stop reason: HOSPADM

## 2018-01-01 RX ORDER — HYDROMORPHONE HCL/PF 1 MG/ML
0.5 SYRINGE (ML) INJECTION ONCE
Status: COMPLETED | OUTPATIENT
Start: 2018-01-01 | End: 2018-01-01

## 2018-01-01 RX ORDER — METOPROLOL TARTRATE 50 MG/1
50 TABLET, FILM COATED ORAL 2 TIMES DAILY
Status: DISCONTINUED | OUTPATIENT
Start: 2018-01-01 | End: 2018-01-01 | Stop reason: HOSPADM

## 2018-01-01 RX ORDER — CEFTRIAXONE 1 G/50ML
1000 INJECTION, SOLUTION INTRAVENOUS ONCE
Status: COMPLETED | OUTPATIENT
Start: 2018-01-01 | End: 2018-01-01

## 2018-01-01 RX ORDER — NITROGLYCERIN 0.4 MG/1
0.4 TABLET SUBLINGUAL
Status: DISCONTINUED | OUTPATIENT
Start: 2018-01-01 | End: 2018-01-01 | Stop reason: HOSPADM

## 2018-01-01 RX ORDER — LORAZEPAM 0.5 MG/1
0.5 TABLET ORAL EVERY 8 HOURS PRN
Qty: 30 TABLET | Refills: 0 | Status: SHIPPED | OUTPATIENT
Start: 2018-01-01 | End: 2019-01-01 | Stop reason: ALTCHOICE

## 2018-01-01 RX ORDER — METOPROLOL SUCCINATE 50 MG/1
TABLET, EXTENDED RELEASE ORAL
Qty: 180 TABLET | Refills: 2 | Status: SHIPPED | OUTPATIENT
Start: 2018-01-01 | End: 2018-01-01 | Stop reason: SDUPTHER

## 2018-01-01 RX ORDER — OXYBUTYNIN CHLORIDE 5 MG/1
5 TABLET ORAL 3 TIMES DAILY
Status: DISCONTINUED | OUTPATIENT
Start: 2018-01-01 | End: 2018-01-01 | Stop reason: HOSPADM

## 2018-01-01 RX ORDER — ATORVASTATIN CALCIUM 40 MG/1
40 TABLET, FILM COATED ORAL DAILY
Qty: 90 TABLET | Refills: 3 | Status: SHIPPED | OUTPATIENT
Start: 2018-01-01

## 2018-01-01 RX ORDER — MORPHINE SULFATE 4 MG/ML
4 INJECTION, SOLUTION INTRAMUSCULAR; INTRAVENOUS ONCE
Status: COMPLETED | OUTPATIENT
Start: 2018-01-01 | End: 2018-01-01

## 2018-01-01 RX ORDER — HYDROMORPHONE HCL/PF 1 MG/ML
0.2 SYRINGE (ML) INJECTION EVERY 4 HOURS PRN
Status: DISCONTINUED | OUTPATIENT
Start: 2018-01-01 | End: 2018-01-01 | Stop reason: HOSPADM

## 2018-01-01 RX ORDER — TAMSULOSIN HYDROCHLORIDE 0.4 MG/1
0.4 CAPSULE ORAL
Status: DISCONTINUED | OUTPATIENT
Start: 2018-01-01 | End: 2018-01-01 | Stop reason: HOSPADM

## 2018-01-01 RX ORDER — FENTANYL CITRATE 50 UG/ML
25 INJECTION, SOLUTION INTRAMUSCULAR; INTRAVENOUS ONCE
Status: COMPLETED | OUTPATIENT
Start: 2018-01-01 | End: 2018-01-01

## 2018-01-01 RX ORDER — DEXTROSE, SODIUM CHLORIDE, SODIUM LACTATE, POTASSIUM CHLORIDE, AND CALCIUM CHLORIDE 5; .6; .31; .03; .02 G/100ML; G/100ML; G/100ML; G/100ML; G/100ML
75 INJECTION, SOLUTION INTRAVENOUS CONTINUOUS
Status: DISCONTINUED | OUTPATIENT
Start: 2018-01-01 | End: 2018-01-01 | Stop reason: HOSPADM

## 2018-01-01 RX ORDER — AZITHROMYCIN 250 MG/1
TABLET, FILM COATED ORAL
Qty: 6 TABLET | Refills: 0 | Status: SHIPPED | OUTPATIENT
Start: 2018-01-01 | End: 2018-01-01 | Stop reason: HOSPADM

## 2018-01-01 RX ORDER — PHENAZOPYRIDINE HYDROCHLORIDE 100 MG/1
100 TABLET, FILM COATED ORAL
Status: DISCONTINUED | OUTPATIENT
Start: 2018-01-01 | End: 2018-01-01 | Stop reason: HOSPADM

## 2018-01-01 RX ORDER — LISINOPRIL 20 MG/1
20 TABLET ORAL DAILY
Qty: 90 TABLET | Refills: 3 | Status: SHIPPED | OUTPATIENT
Start: 2018-01-01 | End: 2019-01-01 | Stop reason: SDUPTHER

## 2018-01-01 RX ORDER — GABAPENTIN 100 MG/1
100 CAPSULE ORAL DAILY
Qty: 21 CAPSULE | Refills: 0 | Status: SHIPPED | OUTPATIENT
Start: 2018-01-01 | End: 2018-01-01 | Stop reason: CLARIF

## 2018-01-01 RX ORDER — ATORVASTATIN CALCIUM 40 MG/1
40 TABLET, FILM COATED ORAL DAILY
Status: DISCONTINUED | OUTPATIENT
Start: 2018-01-01 | End: 2018-01-01 | Stop reason: HOSPADM

## 2018-01-01 RX ORDER — OXYBUTYNIN CHLORIDE 5 MG/1
5 TABLET ORAL 2 TIMES DAILY PRN
Qty: 14 TABLET | Refills: 0 | Status: SHIPPED | OUTPATIENT
Start: 2018-01-01 | End: 2019-01-01 | Stop reason: ALTCHOICE

## 2018-01-01 RX ORDER — MAGNESIUM HYDROXIDE 1200 MG/15ML
3000 LIQUID ORAL CONTINUOUS
Status: DISCONTINUED | OUTPATIENT
Start: 2018-01-01 | End: 2018-01-01 | Stop reason: HOSPADM

## 2018-01-01 RX ORDER — LORAZEPAM 2 MG/ML
1 INJECTION INTRAMUSCULAR EVERY 4 HOURS PRN
Status: DISCONTINUED | OUTPATIENT
Start: 2018-01-01 | End: 2018-01-01 | Stop reason: HOSPADM

## 2018-01-01 RX ORDER — MONTELUKAST SODIUM 10 MG/1
10 TABLET ORAL DAILY
Status: DISCONTINUED | OUTPATIENT
Start: 2018-01-01 | End: 2018-01-01 | Stop reason: HOSPADM

## 2018-01-01 RX ADMIN — METOPROLOL TARTRATE 50 MG: 50 TABLET ORAL at 17:59

## 2018-01-01 RX ADMIN — DEXTROSE, SODIUM CHLORIDE, SODIUM LACTATE, POTASSIUM CHLORIDE, AND CALCIUM CHLORIDE 75 ML/HR: 5; .6; .31; .03; .02 INJECTION, SOLUTION INTRAVENOUS at 16:56

## 2018-01-01 RX ADMIN — PHENAZOPYRIDINE HYDROCHLORIDE 100 MG: 100 TABLET ORAL at 12:40

## 2018-01-01 RX ADMIN — TAMSULOSIN HYDROCHLORIDE 0.4 MG: 0.4 CAPSULE ORAL at 17:12

## 2018-01-01 RX ADMIN — METOPROLOL TARTRATE 50 MG: 50 TABLET ORAL at 09:24

## 2018-01-01 RX ADMIN — METOPROLOL TARTRATE 50 MG: 50 TABLET ORAL at 08:20

## 2018-01-01 RX ADMIN — LORAZEPAM 1 MG: 2 INJECTION, SOLUTION INTRAMUSCULAR; INTRAVENOUS at 09:25

## 2018-01-01 RX ADMIN — DEXTROSE, SODIUM CHLORIDE, SODIUM LACTATE, POTASSIUM CHLORIDE, AND CALCIUM CHLORIDE 75 ML/HR: 5; .6; .31; .03; .02 INJECTION, SOLUTION INTRAVENOUS at 04:43

## 2018-01-01 RX ADMIN — LIDOCAINE HYDROCHLORIDE: 20 JELLY TOPICAL at 05:28

## 2018-01-01 RX ADMIN — METOPROLOL TARTRATE 50 MG: 50 TABLET ORAL at 17:27

## 2018-01-01 RX ADMIN — OXYBUTYNIN CHLORIDE 5 MG: 5 TABLET ORAL at 14:34

## 2018-01-01 RX ADMIN — OXYBUTYNIN CHLORIDE 5 MG: 5 TABLET ORAL at 21:24

## 2018-01-01 RX ADMIN — FENTANYL CITRATE 25 MCG: 50 INJECTION, SOLUTION INTRAMUSCULAR; INTRAVENOUS at 21:42

## 2018-01-01 RX ADMIN — PHENAZOPYRIDINE HYDROCHLORIDE 100 MG: 100 TABLET ORAL at 08:20

## 2018-01-01 RX ADMIN — PHENAZOPYRIDINE HYDROCHLORIDE 100 MG: 100 TABLET ORAL at 17:58

## 2018-01-01 RX ADMIN — ATROPA BELLADONNA AND OPIUM 1 SUPPOSITORY: 16.2; 3 SUPPOSITORY RECTAL at 11:29

## 2018-01-01 RX ADMIN — MONTELUKAST SODIUM 10 MG: 10 TABLET, FILM COATED ORAL at 08:20

## 2018-01-01 RX ADMIN — METOPROLOL TARTRATE 50 MG: 50 TABLET ORAL at 17:12

## 2018-01-01 RX ADMIN — OXYBUTYNIN CHLORIDE 5 MG: 5 TABLET ORAL at 17:12

## 2018-01-01 RX ADMIN — DEXTROSE, SODIUM CHLORIDE, SODIUM LACTATE, POTASSIUM CHLORIDE, AND CALCIUM CHLORIDE 75 ML/HR: 5; .6; .31; .03; .02 INJECTION, SOLUTION INTRAVENOUS at 17:20

## 2018-01-01 RX ADMIN — LORAZEPAM 1 MG: 2 INJECTION, SOLUTION INTRAMUSCULAR; INTRAVENOUS at 14:35

## 2018-01-01 RX ADMIN — FENTANYL CITRATE 50 MCG: 50 INJECTION INTRAMUSCULAR; INTRAVENOUS at 13:07

## 2018-01-01 RX ADMIN — ATROPA BELLADONNA AND OPIUM 1 SUPPOSITORY: 16.2; 3 SUPPOSITORY RECTAL at 04:52

## 2018-01-01 RX ADMIN — CEFTRIAXONE 1000 MG: 1 INJECTION, SOLUTION INTRAVENOUS at 14:49

## 2018-01-01 RX ADMIN — PHENAZOPYRIDINE HYDROCHLORIDE 100 MG: 100 TABLET ORAL at 17:12

## 2018-01-01 RX ADMIN — ATROPA BELLADONNA AND OPIUM 1 SUPPOSITORY: 16.2; 3 SUPPOSITORY RECTAL at 16:21

## 2018-01-01 RX ADMIN — SODIUM CHLORIDE FOR IRRIGATION 3000 ML: 0.9 SOLUTION IRRIGATION at 17:19

## 2018-01-01 RX ADMIN — TAMSULOSIN HYDROCHLORIDE 0.4 MG: 0.4 CAPSULE ORAL at 17:58

## 2018-01-01 RX ADMIN — LORAZEPAM 1 MG: 2 INJECTION, SOLUTION INTRAMUSCULAR; INTRAVENOUS at 21:25

## 2018-01-01 RX ADMIN — DEXTROSE, SODIUM CHLORIDE, SODIUM LACTATE, POTASSIUM CHLORIDE, AND CALCIUM CHLORIDE 75 ML/HR: 5; .6; .31; .03; .02 INJECTION, SOLUTION INTRAVENOUS at 05:38

## 2018-01-01 RX ADMIN — ATORVASTATIN CALCIUM 40 MG: 40 TABLET, FILM COATED ORAL at 09:24

## 2018-01-01 RX ADMIN — HYDROMORPHONE HYDROCHLORIDE 0.5 MG: 1 INJECTION, SOLUTION INTRAMUSCULAR; INTRAVENOUS; SUBCUTANEOUS at 05:49

## 2018-01-01 RX ADMIN — ATROPA BELLADONNA AND OPIUM 1 SUPPOSITORY: 16.2; 3 SUPPOSITORY RECTAL at 22:45

## 2018-01-01 RX ADMIN — OXYBUTYNIN CHLORIDE 5 MG: 5 TABLET ORAL at 08:20

## 2018-01-01 RX ADMIN — ATORVASTATIN CALCIUM 40 MG: 40 TABLET, FILM COATED ORAL at 08:20

## 2018-01-01 RX ADMIN — LISINOPRIL 20 MG: 20 TABLET ORAL at 09:24

## 2018-01-01 RX ADMIN — OXYBUTYNIN CHLORIDE 5 MG: 5 TABLET ORAL at 17:58

## 2018-01-01 RX ADMIN — LISINOPRIL 20 MG: 20 TABLET ORAL at 08:20

## 2018-01-01 RX ADMIN — MONTELUKAST SODIUM 10 MG: 10 TABLET, FILM COATED ORAL at 09:25

## 2018-01-01 RX ADMIN — PHENAZOPYRIDINE HYDROCHLORIDE 100 MG: 100 TABLET ORAL at 14:34

## 2018-01-01 RX ADMIN — MORPHINE SULFATE 4 MG: 4 INJECTION INTRAVENOUS at 12:05

## 2018-01-04 DIAGNOSIS — I25.10 ATHEROSCLEROTIC HEART DISEASE OF NATIVE CORONARY ARTERY WITHOUT ANGINA PECTORIS: ICD-10-CM

## 2018-01-05 ENCOUNTER — GENERIC CONVERSION - ENCOUNTER (OUTPATIENT)
Dept: OTHER | Facility: OTHER | Age: 83
End: 2018-01-05

## 2018-01-05 ENCOUNTER — ALLSCRIPTS OFFICE VISIT (OUTPATIENT)
Dept: OTHER | Facility: OTHER | Age: 83
End: 2018-01-05

## 2018-01-05 ENCOUNTER — TRANSCRIBE ORDERS (OUTPATIENT)
Dept: ADMINISTRATIVE | Facility: HOSPITAL | Age: 83
End: 2018-01-05

## 2018-01-05 ENCOUNTER — HOSPITAL ENCOUNTER (OUTPATIENT)
Dept: NON INVASIVE DIAGNOSTICS | Facility: CLINIC | Age: 83
Discharge: HOME | End: 2018-01-05
Payer: MEDICARE

## 2018-01-05 ENCOUNTER — GENERIC CONVERSION - ENCOUNTER (OUTPATIENT)
Dept: CARDIOLOGY CLINIC | Facility: CLINIC | Age: 83
End: 2018-01-05

## 2018-01-05 DIAGNOSIS — I25.10 ATHEROSCLEROSIS OF NATIVE CORONARY ARTERY WITHOUT ANGINA PECTORIS, UNSPECIFIED WHETHER NATIVE OR TRANSPLANTED HEART: ICD-10-CM

## 2018-01-05 DIAGNOSIS — I25.10 ATHEROSCLEROTIC HEART DISEASE OF NATIVE CORONARY ARTERY WITHOUT ANGINA PECTORIS: ICD-10-CM

## 2018-01-05 DIAGNOSIS — I25.10 ATHEROSCLEROSIS OF NATIVE CORONARY ARTERY WITHOUT ANGINA PECTORIS, UNSPECIFIED WHETHER NATIVE OR TRANSPLANTED HEART: Primary | ICD-10-CM

## 2018-01-05 LAB
ATRIAL RATE: 66 BPM
P AXIS: 52 DEGREES
PR INTERVAL: 170 MS
QRS AXIS: 18 DEGREES
QRSD INTERVAL: 98 MS
QT INTERVAL: 432 MS
QTC INTERVAL: 452 MS
T WAVE AXIS: 8 DEGREES
VENTRICULAR RATE: 66 BPM

## 2018-01-05 PROCEDURE — 93226 XTRNL ECG REC<48 HR SCAN A/R: CPT

## 2018-01-05 PROCEDURE — 93005 ELECTROCARDIOGRAM TRACING: CPT

## 2018-01-05 PROCEDURE — 93225 XTRNL ECG REC<48 HRS REC: CPT

## 2018-01-06 NOTE — PROGRESS NOTES
Assessment   1  3-vessel coronary artery disease (414 00) (I25 10)   · s/p PCI  2  Hypertension (401 9) (I10)  3  Dizziness (780 4) (R42)    Discussion/Summary      Consider benign vertigo, use flonase twice a day for 7 days   consder dehydration and medication effects, drink 5 glasses of water daily   with cardiology eval as discussed  is stable stable on current meds focal signs of CVA    1     The  patient1  was counseled regarding1  instructions for management1   Possible side effects of new medications were reviewed with the patient/guardian today1  The treatment plan was reviewed with the patient/guardian  The patient/guardian understands and agrees with the treatment plan1        1 Amended By: Snow Nixon; Jan 05 2018 2:16 PM EST      Chief Complaint   pt here today c/o dizziness x 1 days      History of Present Illness   HPI: new onset diziness   on Wednesday, 2 days ago, took all meds and lay down after 1 hr, room was spinning, rolled over and felt better, yesterday, felt off balance and slight dizziness, called 911 and was examined and then felt better  BP was elevated when they arrived  then resolved  felt a little unsteady on walking but maybe I am imagining all of this    call dr Rigo [place] office and holter monitor was placed  of CAD, r/o forcva, arrhythmia, blood pressure issues1        1 Amended By: Snow Nixon; Jan 05 2018 2:14 PM EST      Review of Systems        Constitutional: feeling poorly-- and-- feeling tired  ENT: hearing loss, but-- no earache,-- no sore throat-- and-- no nasal discharge  Cardiovascular: no complaints of slow or fast heart rate, no chest pain, no palpitations, no leg claudication or lower extremity edema  Respiratory: no complaints of shortness of breath, no wheezing or cough, no dyspnea on exertion, no orthopnea or PND  Gastrointestinal: nausea  Neurological: dizziness, but-- no numbness-- and-- no confusion        Active Problems 1  3-vessel coronary artery disease (414 00) (I25 10)  2  Aneurysm of thoracic aorta (441 2) (I71 2)  3  Benign prostatic hypertrophy (600 00) (N40 0)  4  Chronic pain syndrome (338 4) (G89 4)  5  Depression (311) (F32 9)  6  DJD (degenerative joint disease), multiple sites (715 89) (M15 9)  7  Hyperlipidemia (272 4) (E78 5)  8  Hypertension (401 9) (I10)  9  Denied: History of Mental health problem  10  Need for influenza vaccination (V04 81) (Z23)  11  Pre-op examination (V72 84) (Z01 818)  12  Primary localized osteoarthritis of left hip (715 15) (M16 12)  13  Denied: History of Substance abuse    Social History    · Cigars (5  A Day) (305 1)   · Cultural background   · NON-   · Current Smoker (305 1)   · Former smoker (V15 82) (S15 129)   · Guns in the home   · Lives with relatives   · Living Situation: Supportive and safe   · Marital History -  (V61 03)   · Never uses seat belt   · No drug use   · Primary language is English   · Racial background   · WHITE   · Recovering Alcoholic   · Working Full Time    Current Meds   1  Aspirin 81 MG TABS; TAKE 1 TABLET DAILY; Therapy: (Recorded:94Nja8174) to Recorded  2  Atorvastatin Calcium 40 MG Oral Tablet; TAKE 1 TABLET DAILY; Therapy: 28NSG3399 to (Evaluate:27Nky5292)  Requested for: 32VBC9602; Last     Rx:24Rri9473 Ordered  3  Benefiber Oral Powder; MIX 1 GM Daily; Therapy: 94ILD1272 to Recorded  4  Betamethasone Sod Phos & Acet 6 (3-3) MG/ML Injection Suspension; USE AS     DIRECTED; To Be Done: 07QXB4640; Status: HOLD FOR - Administration Ordered  5  Clopidogrel Bisulfate 75 MG Oral Tablet; Take 1 tablet daily; Therapy: 15MHP2083 to (QCDOXYDP:10ROL4140)  Requested for: 43LBX4477; Last     Rx:04Qsh3007 Ordered  6  Lidocaine HCl - 1 % Injection Solution; USE AS DIRECTED; To Be Done: 10DTN0767;     Status: HOLD FOR - Administration Ordered  7  Lisinopril 20 MG Oral Tablet; TAKE 1 TABLET DAILY;      Therapy: 02YCG0567 to (Evaluate:26Kkl0407)  Requested for: 51HCQ9352; Last     Rx:05Pcy0899 Ordered  8  Metoprolol Succinate ER 50 MG Oral Tablet Extended Release 24 Hour; take 1 tablet     twice a day; Therapy: 70SAM4792 to (Evaluate:39Ofi6512)  Requested for: 21VOV6800; Last     Rx:19Xre7865 Ordered  9  Montelukast Sodium 10 MG Oral Tablet; TAKE 1 TABLET DAILY; Therapy: 63RUN4959 to (Evaluate:06Wyf5887)  Requested for: 52HWA6123; Last     Rx:01Kcn9714 Ordered  10  Nitroglycerin 0 4 MG Sublingual Tablet Sublingual; TAKE AS DIRECTED; Therapy: 03LMD7697 to (Last Rx:94Jih6227)  Requested for: 11NNK5665 Ordered  11  Probiotic Acidophilus Oral Capsule; TAKE AS DIRECTED; Therapy: 46SEJ6961 to Recorded  12  Tamsulosin HCl - 0 4 MG Oral Capsule; TAKE 1 CAPSULE AT BEDTIME; Therapy: 92PEB5319 to (Evaluate:19Nov2013) Recorded  13  Tylenol 325 MG Oral Tablet; Therapy: (Recorded:31May2017) to Recorded     The medication list was reviewed and updated today  Allergies   1  Amoxicillin-Pot Clavulanate TABS  2  Aspirin TABS    Vitals    Recorded: 37LQY3706 01:39PM   Temperature 97 3 F, Tympanic   Heart Rate 61   Systolic 921, LUE, Sitting   Diastolic 60, LUE, Sitting   Height 5 ft 8 in   Weight 158 lb 5 oz   BMI Calculated 24 07   BSA Calculated 1 85   O2 Saturation 87     Physical Exam        Constitutional      General appearance: No acute distress, well appearing and well nourished  Eyes      Conjunctiva and lids: No swelling, erythema, or discharge  Pupils and irises: Equal, round and reactive to light  Ears, Nose, Mouth, and Throat      External inspection of ears and nose: Normal        Otoscopic examination: Tympanic membrance translucent with normal light reflex  Canals patent without erythema  Nasal mucosa, septum, and turbinates: Normal without edema or erythema  Oropharynx: Normal with no erythema, edema, exudate or lesions         Pulmonary      Respiratory effort: No increased work of breathing or signs of respiratory distress  Auscultation of lungs: Clear to auscultation, equal breath sounds bilaterally, no wheezes, no rales, no rhonci  Cardiovascular      Palpation of heart: Normal PMI, no thrills  Auscultation of heart: Normal rate and rhythm, normal S1 and S2, without murmurs  Examination of extremities for edema and/or varicosities: Normal        Carotid pulses: Normal        Abdomen      Abdomen: Non-tender, no masses  Liver and spleen: No hepatomegaly or splenomegaly  Lymphatic      Palpation of lymph nodes in neck: No lymphadenopathy  Musculoskeletal      Gait and station: Normal        Digits and nails: Normal without clubbing or cyanosis  Inspection/palpation of joints, bones, and muscles: Normal        Skin      Skin and subcutaneous tissue: Normal without rashes or lesions  Neurologic      Cranial nerves: Cranial nerves 2-12 intact  Reflexes: 2+ and symmetric  Sensation: No sensory loss  Psychiatric      Orientation to person, place and time: Normal        Mood and affect: Normal        Additional Exam:1   Aaox3, no focal neuro deficits, F to N is tintact bilateral, gait is WNL1             1 Amended By: Isaac Ribeiro; Jan 05 2018 2:15 PM EST      Signatures    Electronically signed by : TIFFANIE Rivera ; Jan 5 2018  2:16PM EST                       (Author)

## 2018-01-13 VITALS
WEIGHT: 171 LBS | BODY MASS INDEX: 25.91 KG/M2 | HEIGHT: 68 IN | HEART RATE: 55 BPM | SYSTOLIC BLOOD PRESSURE: 118 MMHG | DIASTOLIC BLOOD PRESSURE: 70 MMHG

## 2018-01-13 VITALS
HEIGHT: 68 IN | WEIGHT: 175.5 LBS | BODY MASS INDEX: 26.6 KG/M2 | SYSTOLIC BLOOD PRESSURE: 125 MMHG | DIASTOLIC BLOOD PRESSURE: 80 MMHG | HEART RATE: 72 BPM

## 2018-01-13 VITALS
HEART RATE: 72 BPM | HEIGHT: 68 IN | DIASTOLIC BLOOD PRESSURE: 80 MMHG | BODY MASS INDEX: 26.52 KG/M2 | WEIGHT: 175 LBS | SYSTOLIC BLOOD PRESSURE: 140 MMHG

## 2018-01-13 VITALS
WEIGHT: 166 LBS | BODY MASS INDEX: 25.24 KG/M2 | DIASTOLIC BLOOD PRESSURE: 62 MMHG | SYSTOLIC BLOOD PRESSURE: 100 MMHG | HEART RATE: 72 BPM

## 2018-01-13 VITALS
HEIGHT: 68 IN | HEART RATE: 70 BPM | DIASTOLIC BLOOD PRESSURE: 78 MMHG | WEIGHT: 171.13 LBS | BODY MASS INDEX: 25.93 KG/M2 | SYSTOLIC BLOOD PRESSURE: 152 MMHG

## 2018-01-14 VITALS
HEIGHT: 68 IN | SYSTOLIC BLOOD PRESSURE: 138 MMHG | TEMPERATURE: 98 F | BODY MASS INDEX: 24.4 KG/M2 | WEIGHT: 161 LBS | RESPIRATION RATE: 16 BRPM | DIASTOLIC BLOOD PRESSURE: 68 MMHG | HEART RATE: 72 BPM

## 2018-01-14 VITALS
WEIGHT: 178 LBS | BODY MASS INDEX: 26.98 KG/M2 | HEIGHT: 68 IN | SYSTOLIC BLOOD PRESSURE: 142 MMHG | DIASTOLIC BLOOD PRESSURE: 72 MMHG

## 2018-01-14 VITALS
BODY MASS INDEX: 24.71 KG/M2 | WEIGHT: 163 LBS | SYSTOLIC BLOOD PRESSURE: 140 MMHG | DIASTOLIC BLOOD PRESSURE: 80 MMHG | HEART RATE: 65 BPM | HEIGHT: 68 IN | RESPIRATION RATE: 14 BRPM

## 2018-01-15 NOTE — RESULT NOTES
Message   Call patient, labs OK      Verified Results  (1) CBC/ PLT (NO DIFF) 51XQI8309 10:00AM Derrick Mays Order Number: [id number]     Test Name Result Flag Reference   HEMATOCRIT 39 2 %  36 5-49 3   HEMOGLOBIN 12 9 g/dL  12 0-17 0   MCHC 32 9 g/dL  31 4-37 4   MCH 30 3 pg  26 8-34 3   MCV 92 fL  82-98   PLATELET COUNT 429 Thousands/uL  149-390   RBC COUNT 4 26 Million/uL  3 88-5 62   RDW 12 9 %  11 6-15 1   WBC COUNT 14 07 Thousand/uL H 4 31-10 16   MPV 9 3 fL  8 9-12 7     (1) COMPREHENSIVE METABOLIC PANEL 63VJO2340 32:98BV Kerns Cintia    Order Number: [id number]     Test Name Result Flag Reference   GLUCOSE,RANDM 99 mg/dL     If the patient is fasting, the ADA then defines impaired fasting glucose as > 100 mg/dL and diabetes as > or equal to 123 mg/dL  SODIUM 140 mmol/L  136-145   POTASSIUM 4 4 mmol/L  3 5-5 3   CHLORIDE 106 mmol/L  100-108   CARBON DIOXIDE 26 mmol/L  21-32   ANION GAP (CALC) 8 mmol/L  4-13   BLOOD UREA NITROGEN 30 mg/dL H 5-25   CREATININE 1 13 mg/dL  0 60-1 30   Standardized to IDMS reference method   CALCIUM 8 6 mg/dL  8 3-10 1   BILI, TOTAL 0 60 mg/dL  0 20-1 00   ALK PHOSPHATAS 74 U/L     ALT (SGPT) 25 U/L  12-78   AST(SGOT) 15 U/L  5-45   ALBUMIN 3 2 g/dL L 3 5-5 0   TOTAL PROTEIN 7 2 g/dL  6 4-8 2   eGFR Non-African American      >60 0 ml/min/1 73sq m   - Patient Instructions: This is a fasting blood test  Water, black tea or black coffee only after 9:00pm the night before test Drink 2 glasses of water the morning of test - Patient Instructions: This bloodwork is non-fasting  Please drink two glasses of   water morning of bloodwork  National Kidney Disease Education Program recommendations are as follows:  GFR calculation is accurate only with a steady state creatinine  Chronic Kidney disease less than 60 ml/min/1 73 sq  meters  Kidney failure less than 15 ml/min/1 73 sq  meters       (1) LIPID PANEL FASTING W DIRECT LDL REFLEX 13XIG5602 10: 00AM Poornima Goldstein   TW Order Number: [id number]     Test Name Result Flag Reference   CHOLESTEROL 118 mg/dL     LDL CHOLESTEROL CALCULATED 53 mg/dL  0-100   - Patient Instructions: This is a fasting blood test  Water, black tea or black coffee only after 9:00pm the night before test   Drink 2 glasses of water the morning of test     - Patient Instructions: This is a fasting blood test  Water, black tea or black coffee only after 9:00pm the night before test Drink 2 glasses of water the morning of test - Patient Instructions: This bloodwork is non-fasting  Please drink two glasses of   water morning of bloodwork  Triglyceride:         Normal              <150 mg/dl       Borderline High    150-199 mg/dl       High               200-499 mg/dl       Very High          >499 mg/dl  Cholesterol:         Desirable        <200 mg/dl      Borderline High  200-239 mg/dl      High             >239 mg/dl  HDL Cholesterol:        High    >59 mg/dL      Low     <41 mg/dL  LDL Cholesterol:        Optimal          <100 mg/dl        Near Optimal     100-129 mg/dl        Above Optimal          Borderline High   130-159 mg/dl          High              160-189 mg/dl          Very High        >189 mg/dl  LDL CALCULATED:    This screening LDL is a calculated result  It does not have the accuracy of the Direct Measured LDL in the monitoring of patients with hyperlipidemia and/or statin therapy  Direct Measure LDL (UID663) must be ordered separately in these patients  TRIGLYCERIDES 67 mg/dL  <=150   Specimen collection should occur prior to N-Acetylcysteine or Metamizole administration due to the potential for falsely depressed results  HDL,DIRECT 52 mg/dL  40-60   Specimen collection should occur prior to Metamizole administration due to the potential for falsely depressed results       (1) TSH 63UMV3175 10:00AM Kadietelvina Goldstein   TW Order Number: [id number]     Test Name Result Flag Reference   TSH 0 977 uIU/mL  0 358-3 740   - Patient Instructions: This bloodwork is non-fasting  Please drink two glasses of water morning of bloodwork  - Patient Instructions: This is a fasting blood test  Water, black tea or black coffee only after 9:00pm the night before test Drink 2 glasses of water the morning of test - Patient Instructions: This bloodwork is non-fasting  Please drink two glasses of   water morning of bloodwork  Patients undergoing fluorescein dye angiography may retain small amounts of fluorescein in the body for 48-72 hours post procedure  Samples containing fluorescein can produce falsely depressed TSH values  If the patient had this procedure,a specimen should be resubmitted post fluorescein clearance  (1) HEMOGLOBIN A1C 30Jan2017 10:00AM Nesha Mays    Order Number: [id number]     Test Name Result Flag Reference   HEMOGLOBIN A1C 6 4 % H 4 2-6 3   EST  AVG   GLUCOSE 137 mg/dl
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
## 2018-01-16 NOTE — RESULT NOTES
Message   Significant arthritis of the spine would consider [facility]        Verified Results  * XR SPINE CERVICAL COMPLETE 4 OR 5 VW NON INJURY 30Jan2017 09:27AM Madeline Sesaytifandrew Order Number: [id number]     Test Name Result Flag Reference   XR SPINE CERVICAL COMPLETE 4 OR 5 VW (Report)     CERVICAL SPINE     INDICATION: Neck pain  COMPARISON: 12/12/2008     VIEWS: 5; 7 images     FINDINGS:     No acute fracture identified  There is minimal anterolisthesis C3 on C4 which is degenerative appearing  There is severe disc disease throughout the cervical spine, most notably at C4-C5 and C5-C6 and C6-C7 with lesser amounts of disc degeneration at C2-C3 and C3-C4 and C7-T1  There is degenerative arthritis of the C2-C3 and C3-C4 facet joints and also    probably at C5-C6  Oblique views suggest narrowing of bilateral upper cervical neural foramina at C2-C3 and C3-C4 and C4-C5 on the left and on the right and also narrowing on the right at C5-C6      The odontoid intact  The prevertebral soft tissues are within normal limits  The lung apices are intact  IMPRESSION:     Extensive degenerative changes of the cervical spine similar to the prior study  Minimal degenerative anterolisthesis C3 on C4 which appears new         Workstation performed: [id number]     Signed by:   Jayme Tineo MD   1/31/17

## 2018-01-23 VITALS
HEIGHT: 68 IN | OXYGEN SATURATION: 87 % | SYSTOLIC BLOOD PRESSURE: 132 MMHG | WEIGHT: 158.31 LBS | BODY MASS INDEX: 23.99 KG/M2 | TEMPERATURE: 97.3 F | DIASTOLIC BLOOD PRESSURE: 60 MMHG | HEART RATE: 61 BPM

## 2018-02-13 DIAGNOSIS — I25.118 CORONARY ARTERY DISEASE OF NATIVE ARTERY OF NATIVE HEART WITH STABLE ANGINA PECTORIS (HCC): Primary | ICD-10-CM

## 2018-02-13 RX ORDER — METOPROLOL SUCCINATE 50 MG/1
TABLET, EXTENDED RELEASE ORAL
Qty: 180 TABLET | Refills: 2 | Status: SHIPPED | OUTPATIENT
Start: 2018-02-13 | End: 2018-02-22

## 2018-02-22 ENCOUNTER — APPOINTMENT (OUTPATIENT)
Dept: LAB | Facility: HOSPITAL | Age: 83
End: 2018-02-22
Attending: INTERNAL MEDICINE
Payer: MEDICARE

## 2018-02-22 ENCOUNTER — OFFICE VISIT (OUTPATIENT)
Dept: FAMILY MEDICINE CLINIC | Facility: HOSPITAL | Age: 83
End: 2018-02-22
Payer: MEDICARE

## 2018-02-22 VITALS
OXYGEN SATURATION: 98 % | DIASTOLIC BLOOD PRESSURE: 70 MMHG | HEART RATE: 60 BPM | TEMPERATURE: 97.2 F | HEIGHT: 68 IN | WEIGHT: 165.5 LBS | BODY MASS INDEX: 25.08 KG/M2 | SYSTOLIC BLOOD PRESSURE: 150 MMHG

## 2018-02-22 DIAGNOSIS — I25.10 CORONARY ARTERY DISEASE INVOLVING NATIVE CORONARY ARTERY OF NATIVE HEART, ANGINA PRESENCE UNSPECIFIED: Chronic | ICD-10-CM

## 2018-02-22 DIAGNOSIS — I10 ESSENTIAL HYPERTENSION: Primary | ICD-10-CM

## 2018-02-22 DIAGNOSIS — H53.8 BLURRY VISION, BILATERAL: ICD-10-CM

## 2018-02-22 DIAGNOSIS — I10 ESSENTIAL HYPERTENSION: ICD-10-CM

## 2018-02-22 LAB
ALBUMIN SERPL BCP-MCNC: 3.6 G/DL (ref 3.5–5)
ALP SERPL-CCNC: 76 U/L (ref 46–116)
ALT SERPL W P-5'-P-CCNC: 31 U/L (ref 12–78)
ANION GAP SERPL CALCULATED.3IONS-SCNC: 8 MMOL/L (ref 4–13)
AST SERPL W P-5'-P-CCNC: 18 U/L (ref 5–45)
BILIRUB SERPL-MCNC: 0.4 MG/DL (ref 0.2–1)
BUN SERPL-MCNC: 18 MG/DL (ref 5–25)
CALCIUM SERPL-MCNC: 8.7 MG/DL (ref 8.3–10.1)
CHLORIDE SERPL-SCNC: 107 MMOL/L (ref 100–108)
CO2 SERPL-SCNC: 28 MMOL/L (ref 21–32)
CREAT SERPL-MCNC: 1.13 MG/DL (ref 0.6–1.3)
GFR SERPL CREATININE-BSD FRML MDRD: 60 ML/MIN/1.73SQ M
GLUCOSE SERPL-MCNC: 64 MG/DL (ref 65–140)
POTASSIUM SERPL-SCNC: 4 MMOL/L (ref 3.5–5.3)
PROT SERPL-MCNC: 7.6 G/DL (ref 6.4–8.2)
SODIUM SERPL-SCNC: 143 MMOL/L (ref 136–145)
TSH SERPL DL<=0.05 MIU/L-ACNC: 1.53 UIU/ML (ref 0.36–3.74)

## 2018-02-22 PROCEDURE — 84443 ASSAY THYROID STIM HORMONE: CPT

## 2018-02-22 PROCEDURE — 80053 COMPREHEN METABOLIC PANEL: CPT

## 2018-02-22 PROCEDURE — 36415 COLL VENOUS BLD VENIPUNCTURE: CPT

## 2018-02-22 PROCEDURE — 99214 OFFICE O/P EST MOD 30 MIN: CPT | Performed by: INTERNAL MEDICINE

## 2018-02-22 NOTE — PROGRESS NOTES
Assessment/Plan:    Hypertension  Stable, on lisinopril    Patient Active Problem List   Diagnosis    Hypertension    Hyperlipidemia    Depression    Chronic pain    Cardiac disease    Coronary artery disease involving native coronary artery    Thoracic aortic aneurysm (HCC)    Blurry vision, bilateral     No orders of the defined types were placed in this encounter  Diagnoses and all orders for this visit:    Essential hypertension  -     Comprehensive metabolic panel; Future    Coronary artery disease involving native coronary artery of native heart, angina presence unspecified    Blurry vision, bilateral  -     TSH, 3rd generation with T4 reflex; Future    Other orders  -     Cancel: Ambulatory Referral to Ophthalmology; Future          Subjective:      Patient ID: Darilea Gee is a 80 y o  male  Presents today c/o blurry vision  Patient describes these episodes as intermittent  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, he describes his eyes get "warm" and watery and vision gets blurry  Denies double vision   No headache,dizziness, gait disturbance  Does feel fatigue        The following portions of the patient's history were reviewed and updated as appropriate: allergies, current medications, past family history, past medical history, past social history, past surgical history and problem list     Review of Systems   Constitutional: Negative for fatigue and fever  HENT: Negative for hearing loss  Eyes: Positive for visual disturbance  Eyes get warm and watery   Respiratory: Negative for cough, chest tightness, shortness of breath and wheezing  Cardiovascular: Negative for chest pain, palpitations and leg swelling  Gastrointestinal: Negative for abdominal pain, diarrhea and nausea  Genitourinary: Negative for dysuria and hematuria  Musculoskeletal: Negative for arthralgias  Neurological: Negative for dizziness, numbness and headaches     Psychiatric/Behavioral: Negative for confusion and dysphoric mood  All other systems reviewed and are negative  Objective:      Current Outpatient Prescriptions:     aspirin (ECOTRIN LOW STRENGTH) 81 mg EC tablet, Take 81 mg by mouth daily, Disp: , Rfl:     atorvastatin (LIPITOR) 40 mg tablet, Take 1 tablet by mouth daily, Disp: 30 tablet, Rfl: 3    clopidogrel (PLAVIX) 75 mg tablet, Take 75 mg by mouth daily, Disp: , Rfl:     lisinopril (ZESTRIL) 20 mg tablet, Take 20 mg by mouth daily, Disp: , Rfl:     metoprolol tartrate (LOPRESSOR) 50 mg tablet, Take 50 mg by mouth 2 (two) times a day, Disp: , Rfl:     montelukast (SINGULAIR) 10 mg tablet, Take 10 mg by mouth daily at bedtime, Disp: , Rfl:     nitroglycerin (NITROSTAT) 0 4 mg SL tablet, Place 0 4 mg under the tongue every 5 (five) minutes as needed for chest pain, Disp: , Rfl:     Polyethylene Glycol 3350 (MIRALAX PO), Take 1 Dose by mouth daily, Disp: , Rfl:     tamsulosin (FLOMAX) 0 4 mg, Take 0 4 mg by mouth daily with dinner, Disp: , Rfl:     gabapentin (NEURONTIN) 300 mg capsule, Take 300 mg by mouth 3 (three) times a day, Disp: , Rfl:     HYDROcodone-acetaminophen (NORCO) 5-325 mg per tablet, Take 1 tablet by mouth every 6 (six) hours as needed for pain, Disp: , Rfl:     HYDROcodone-acetaminophen (NORCO) 5-325 mg per tablet, Take 1 tablet by mouth every 4 (four) hours as needed for pain Max Daily Amount: 6 tablets, Disp: 12 tablet, Rfl: 0    metoprolol succinate (TOPROL-XL) 50 mg 24 hr tablet, TAKE 1 TABLET TWICE A DAY, Disp: 180 tablet, Rfl: 2     Physical Exam   Constitutional: He is oriented to person, place, and time  He appears well-developed and well-nourished  Eyes: Pupils are equal, round, and reactive to light  Neck: Normal range of motion  Neck supple  No thyromegaly present  Cardiovascular: Normal rate, regular rhythm, normal heart sounds and intact distal pulses  No murmur heard    Pulmonary/Chest: Effort normal and breath sounds normal  He has no wheezes  He has no rales  Abdominal: Soft  Bowel sounds are normal  There is no tenderness  Musculoskeletal: Normal range of motion  He exhibits no edema, tenderness or deformity  Lymphadenopathy:     He has no cervical adenopathy  Neurological: He is alert and oriented to person, place, and time  He has normal reflexes  Skin: Skin is warm and dry  Psychiatric: He has a normal mood and affect

## 2018-02-22 NOTE — PATIENT INSTRUCTIONS
You were seen for a follow up of chronic medical problems today  Be sure to make any changes to your medication as discussed by your doctor  If blood tests or other testing was ordered, be sure to obtain this at the time requested by the doctor  Our office will call you with the results of your tests once they arrive in our office  Get lab tests ordered and see dr Kelly Hawkins, eye doctor        Might need further testing if these tests are non revealing

## 2018-02-22 NOTE — ASSESSMENT & PLAN NOTE
Sees dr Kait Carey regularly and is compliant with medications    Did have carotid u/s done in august and it is WNL

## 2018-02-27 ENCOUNTER — OFFICE VISIT (OUTPATIENT)
Dept: CARDIOLOGY CLINIC | Facility: CLINIC | Age: 83
End: 2018-02-27
Payer: MEDICARE

## 2018-02-27 ENCOUNTER — TELEPHONE (OUTPATIENT)
Dept: CARDIOLOGY CLINIC | Facility: CLINIC | Age: 83
End: 2018-02-27

## 2018-02-27 VITALS
HEIGHT: 67 IN | SYSTOLIC BLOOD PRESSURE: 166 MMHG | BODY MASS INDEX: 26.21 KG/M2 | DIASTOLIC BLOOD PRESSURE: 80 MMHG | WEIGHT: 167 LBS | HEART RATE: 66 BPM

## 2018-02-27 DIAGNOSIS — I10 ESSENTIAL HYPERTENSION: ICD-10-CM

## 2018-02-27 DIAGNOSIS — E78.49 OTHER HYPERLIPIDEMIA: ICD-10-CM

## 2018-02-27 DIAGNOSIS — I25.118 CORONARY ARTERY DISEASE OF NATIVE ARTERY OF NATIVE HEART WITH STABLE ANGINA PECTORIS (HCC): Primary | Chronic | ICD-10-CM

## 2018-02-27 DIAGNOSIS — I71.2 THORACIC AORTIC ANEURYSM WITHOUT RUPTURE (HCC): Chronic | ICD-10-CM

## 2018-02-27 PROCEDURE — 99214 OFFICE O/P EST MOD 30 MIN: CPT | Performed by: INTERNAL MEDICINE

## 2018-02-27 RX ORDER — CLINDAMYCIN HYDROCHLORIDE 150 MG/1
CAPSULE ORAL
Refills: 4 | COMMUNITY
Start: 2017-12-12 | End: 2018-09-18

## 2018-02-27 RX ORDER — NITROGLYCERIN 0.4 MG/1
0.4 TABLET SUBLINGUAL
Qty: 90 TABLET | Refills: 0 | Status: SHIPPED | OUTPATIENT
Start: 2018-02-27 | End: 2018-03-24 | Stop reason: SDUPTHER

## 2018-02-27 RX ORDER — FLUTICASONE PROPIONATE 50 MCG
1 SPRAY, SUSPENSION (ML) NASAL 2 TIMES DAILY
COMMUNITY
Start: 2018-01-05 | End: 2018-06-01 | Stop reason: ALTCHOICE

## 2018-02-27 RX ORDER — ACETAMINOPHEN 325 MG/1
TABLET ORAL
COMMUNITY
End: 2018-06-01 | Stop reason: ALTCHOICE

## 2018-02-27 NOTE — PROGRESS NOTES
Cardiology Follow Up    Ellie Rosas  1935  [id number]  HEART & VASCULAR [facility] [place]  [address]    1  Coronary artery disease of native artery of native heart with stable angina pectoris ([facility] [address] )     2  Thoracic aortic aneurysm without rupture ([facility] [address] )     3  Essential hypertension     4  Other hyperlipidemia         Interval History:  Cardiology follow-up  Denies any cardiac problems including chest discomfort dyspnea, still very active for age  He developed again an episode of possible as scotomas questionable amaurosis fugax 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  He did have some possible him anopsia  He is not certain whether the he was bilateral or just 1 eye  And lasted for several minutes  This is the 3rd episode of these in the last year  He did have a Ophthalmology evaluation that was mostly unremarkable  Recent Holter revealed normal sinus rhythm  There was no supraventricular arrhythmias  No sustained tachyarrhythmias or bradyarrhythmias  Carotids last year revealed less than 50% stenosis,  He did undergo a catheterization 6 months ago that revealed occluded RCA previous site of the complex stents with good grade 3 left-to-right collaterals  And a 50% marginal and LAD lesions that it manage medically    Patient Active Problem List   Diagnosis    Hypertension    Hyperlipidemia    Depression    Chronic pain    Cardiac disease    Coronary artery disease involving native coronary artery    Thoracic aortic aneurysm (HCC)    Blurry vision, bilateral     Past Medical History:   Diagnosis Date    Cardiac disease     Chronic pain     Depression     Hyperlipidemia     Hypertension     Thoracic aneurysm without mention of rupture      Social History     Social History    Marital status:      Spouse name: N/A    Number of children: N/A    Years of education: N/A     Occupational History    Not on file  Social History Main Topics    Smoking status: Former Smoker     Types: Cigars    Smokeless tobacco: Never Used      Comment: 1 cigar daily    Alcohol use No    Drug use: No    Sexual activity: Not Currently     Other Topics Concern    Not on file     Social History Narrative    Wears a seat belt       No family history on file    Past Surgical History:   Procedure Laterality Date    CORONARY ANGIOPLASTY WITH STENT PLACEMENT      JOINT REPLACEMENT      hip       Current Outpatient Prescriptions:     acetaminophen (TYLENOL) 325 mg tablet, Take by mouth, Disp: , Rfl:     aspirin (ECOTRIN LOW STRENGTH) 81 mg EC tablet, Take 81 mg by mouth daily, Disp: , Rfl:     atorvastatin (LIPITOR) 40 mg tablet, Take 1 tablet by mouth daily, Disp: 30 tablet, Rfl: 3    clindamycin (CLEOCIN) 150 mg capsule, TAKE 4 CAPSULES 1 HOUR PRIOR TO APPOINTMENT ORALLY, Disp: , Rfl: 4    clopidogrel (PLAVIX) 75 mg tablet, Take 75 mg by mouth daily, Disp: , Rfl:     fluticasone (FLONASE) 50 mcg/act nasal spray, 1 spray into each nostril 2 (two) times a day, Disp: , Rfl:     lisinopril (ZESTRIL) 20 mg tablet, Take 20 mg by mouth daily, Disp: , Rfl:     metoprolol tartrate (LOPRESSOR) 50 mg tablet, Take 50 mg by mouth 2 (two) times a day, Disp: , Rfl:     montelukast (SINGULAIR) 10 mg tablet, Take 10 mg by mouth daily at bedtime, Disp: , Rfl:     nitroglycerin (NITROSTAT) 0 4 mg SL tablet, Place 0 4 mg under the tongue every 5 (five) minutes as needed for chest pain, Disp: , Rfl:     Polyethylene Glycol 3350 (MIRALAX PO), Take 1 Dose by mouth daily, Disp: , Rfl:     tamsulosin (FLOMAX) 0 4 mg, Take 0 4 mg by mouth daily with dinner, Disp: , Rfl:   Allergies   Allergen Reactions    Aspirin GI Intolerance    Augmentin [Amoxicillin-Pot Clavulanate]        Labs:  Appointment on 02/22/2018   Component Date Value    Sodium 02/22/2018 143     Potassium 02/22/2018 4 0     Chloride 02/22/2018 107     CO2 02/22/2018 28     Anion Gap 02/22/2018 8     BUN 02/22/2018 18     Creatinine 02/22/2018 1 13     Glucose 02/22/2018 64*    Calcium 02/22/2018 8 7     AST 02/22/2018 18     ALT 02/22/2018 31     Alkaline Phosphatase 02/22/2018 76     Total Protein 02/22/2018 7 6     Albumin 02/22/2018 3 6     Total Bilirubin 02/22/2018 0 40     eGFR 02/22/2018 60     TSH 3RD GENERATON 02/22/2018 1 527    Hospital Outpatient Visit on 01/05/2018   Component Date Value    Ventricular Rate 01/05/2018 66     Atrial Rate 01/05/2018 66     VT Interval 01/05/2018 170     QRSD Interval 01/05/2018 98     QT Interval 01/05/2018 432     QTC Interval 01/05/2018 452     P Axis 01/05/2018 52     QRS Axis 01/05/2018 18     T Wave [place] 01/05/2018 8    Appointment on 11/08/2017   Component Date Value    Cholesterol 11/08/2017 114     Triglycerides 11/08/2017 95     HDL, Direct 11/08/2017 49     LDL Calculated 11/08/2017 46     Total Bilirubin 11/08/2017 0 50     Bilirubin, Direct 11/08/2017 0 15     Alkaline Phosphatase 11/08/2017 77     AST 11/08/2017 11     ALT 11/08/2017 31     Total Protein 11/08/2017 7 3     Albumin 11/08/2017 3 6      Imaging: No results found  Review of Systems:  Review of Systems   Constitutional: Negative for diaphoresis, fatigue and unexpected weight change  HENT: Negative for hearing loss  Eyes: Positive for visual disturbance  Negative for photophobia  Respiratory: Negative for shortness of breath  Cardiovascular: Negative for chest pain and leg swelling  Gastrointestinal: Negative for abdominal pain and blood in stool  Endocrine: Negative for cold intolerance  Genitourinary: Negative for hematuria  Musculoskeletal: Negative for arthralgias and myalgias  Skin: Negative for pallor and rash  Neurological: Negative for dizziness, tremors, seizures, syncope, facial asymmetry, speech difficulty, weakness and numbness  Hematological: Does not bruise/bleed easily  Psychiatric/Behavioral: Negative for confusion  Physical Exam:  Physical Exam   Constitutional: He is oriented to person, place, and time  He appears well-developed and well-nourished  No distress  Neck: No JVD present  Cardiovascular: Normal rate, regular rhythm, normal heart sounds and intact distal pulses  Exam reveals no gallop and no friction rub  No murmur heard  Pulmonary/Chest: Effort normal and breath sounds normal  No respiratory distress  He has no wheezes  He has no rales  He exhibits no tenderness  Abdominal: Soft  Neurological: He is alert and oriented to person, place, and time  Skin: He is not diaphoretic  Psychiatric: He has a normal mood and affect  Discussion/Summary:  Coronary artery disease stable, anatomy on catheterization as described above  No angina at the present time continue current medical regimen  Lipids well control reason LDL 46 on high-intensity statin therapy  Left ventricular systolic function is normal with inferior severe hypokinesis  He does have an ascending aortic aneurysm, 42 mm  On CT scan last year, will repeat a ADDIE  Mostly to look for cardiac source of emboli given the recent visual symptoms but I am not certain this or TIA symptoms  Will repeat a Holter as well despite the negative Holter couple months ago

## 2018-02-28 ENCOUNTER — HOSPITAL ENCOUNTER (OUTPATIENT)
Dept: NON INVASIVE DIAGNOSTICS | Facility: CLINIC | Age: 83
Discharge: HOME | End: 2018-02-28
Payer: MEDICARE

## 2018-02-28 DIAGNOSIS — I25.118 CORONARY ARTERY DISEASE OF NATIVE ARTERY OF NATIVE HEART WITH STABLE ANGINA PECTORIS (HCC): Chronic | ICD-10-CM

## 2018-02-28 PROCEDURE — 93225 XTRNL ECG REC<48 HRS REC: CPT

## 2018-02-28 PROCEDURE — 93226 XTRNL ECG REC<48 HR SCAN A/R: CPT

## 2018-03-05 ENCOUNTER — TELEPHONE (OUTPATIENT)
Dept: CARDIOLOGY CLINIC | Facility: CLINIC | Age: 83
End: 2018-03-05

## 2018-03-13 PROCEDURE — 93227 XTRNL ECG REC<48 HR R&I: CPT | Performed by: INTERNAL MEDICINE

## 2018-03-24 DIAGNOSIS — I25.118 CORONARY ARTERY DISEASE OF NATIVE ARTERY OF NATIVE HEART WITH STABLE ANGINA PECTORIS (HCC): Chronic | ICD-10-CM

## 2018-03-26 RX ORDER — NITROGLYCERIN 0.4 MG/1
0.4 TABLET SUBLINGUAL
Qty: 100 TABLET | Refills: 0 | Status: SHIPPED | OUTPATIENT
Start: 2018-03-26 | End: 2019-01-01 | Stop reason: SDUPTHER

## 2018-03-28 ENCOUNTER — ANESTHESIA EVENT (OUTPATIENT)
Dept: NON INVASIVE DIAGNOSTICS | Facility: HOSPITAL | Age: 83
End: 2018-03-28

## 2018-03-29 ENCOUNTER — HOSPITAL ENCOUNTER (OUTPATIENT)
Dept: NON INVASIVE DIAGNOSTICS | Facility: HOSPITAL | Age: 83
Discharge: HOME | End: 2018-03-29
Attending: INTERNAL MEDICINE
Payer: MEDICARE

## 2018-03-29 ENCOUNTER — ANESTHESIA (OUTPATIENT)
Dept: NON INVASIVE DIAGNOSTICS | Facility: HOSPITAL | Age: 83
End: 2018-03-29

## 2018-03-29 VITALS
DIASTOLIC BLOOD PRESSURE: 80 MMHG | RESPIRATION RATE: 18 BRPM | SYSTOLIC BLOOD PRESSURE: 142 MMHG | OXYGEN SATURATION: 96 % | HEART RATE: 72 BPM

## 2018-03-29 DIAGNOSIS — I71.2 THORACIC AORTIC ANEURYSM WITHOUT RUPTURE (HCC): Chronic | ICD-10-CM

## 2018-03-29 DIAGNOSIS — I25.118 CORONARY ARTERY DISEASE OF NATIVE ARTERY OF NATIVE HEART WITH STABLE ANGINA PECTORIS (HCC): Chronic | ICD-10-CM

## 2018-03-29 PROCEDURE — 93312 ECHO TRANSESOPHAGEAL: CPT | Performed by: INTERNAL MEDICINE

## 2018-03-29 PROCEDURE — 93320 DOPPLER ECHO COMPLETE: CPT | Performed by: INTERNAL MEDICINE

## 2018-03-29 PROCEDURE — 93312 ECHO TRANSESOPHAGEAL: CPT

## 2018-03-29 PROCEDURE — 93325 DOPPLER ECHO COLOR FLOW MAPG: CPT | Performed by: INTERNAL MEDICINE

## 2018-03-29 RX ORDER — PROPOFOL 10 MG/ML
INJECTION, EMULSION INTRAVENOUS CONTINUOUS PRN
Status: DISCONTINUED | OUTPATIENT
Start: 2018-03-29 | End: 2018-03-29 | Stop reason: SURG

## 2018-03-29 RX ORDER — GLYCOPYRROLATE 0.2 MG/ML
INJECTION INTRAMUSCULAR; INTRAVENOUS AS NEEDED
Status: DISCONTINUED | OUTPATIENT
Start: 2018-03-29 | End: 2018-03-29 | Stop reason: SURG

## 2018-03-29 RX ORDER — LIDOCAINE HYDROCHLORIDE 10 MG/ML
INJECTION, SOLUTION INFILTRATION; PERINEURAL AS NEEDED
Status: DISCONTINUED | OUTPATIENT
Start: 2018-03-29 | End: 2018-03-29 | Stop reason: SURG

## 2018-03-29 RX ORDER — PROPOFOL 10 MG/ML
INJECTION, EMULSION INTRAVENOUS CONTINUOUS PRN
Status: DISCONTINUED | OUTPATIENT
Start: 2018-03-29 | End: 2018-03-29

## 2018-03-29 RX ORDER — SODIUM CHLORIDE 9 MG/ML
INJECTION, SOLUTION INTRAVENOUS CONTINUOUS PRN
Status: DISCONTINUED | OUTPATIENT
Start: 2018-03-29 | End: 2018-03-29 | Stop reason: SURG

## 2018-03-29 RX ORDER — PROPOFOL 10 MG/ML
INJECTION, EMULSION INTRAVENOUS AS NEEDED
Status: DISCONTINUED | OUTPATIENT
Start: 2018-03-29 | End: 2018-03-29 | Stop reason: SURG

## 2018-03-29 RX ADMIN — LIDOCAINE HYDROCHLORIDE 50 MG: 10 INJECTION, SOLUTION INFILTRATION; PERINEURAL at 09:47

## 2018-03-29 RX ADMIN — SODIUM CHLORIDE: 0.9 INJECTION, SOLUTION INTRAVENOUS at 09:22

## 2018-03-29 RX ADMIN — PROPOFOL 80 MCG/KG/MIN: 10 INJECTION, EMULSION INTRAVENOUS at 09:48

## 2018-03-29 RX ADMIN — TOPICAL ANESTHETIC 1 SPRAY: 200 SPRAY DENTAL; PERIODONTAL at 09:47

## 2018-03-29 RX ADMIN — PROPOFOL 80 MG: 10 INJECTION, EMULSION INTRAVENOUS at 09:48

## 2018-03-29 RX ADMIN — GLYCOPYRROLATE 0.2 MG: 0.2 INJECTION, SOLUTION INTRAMUSCULAR; INTRAVENOUS at 09:42

## 2018-03-29 NOTE — ANESTHESIA POSTPROCEDURE EVALUATION
Post-Op Assessment Note      CV Status:  Stable    Mental Status:  Alert and awake    Hydration Status:  Euvolemic    PONV Controlled:  Controlled    Airway Patency:  Patent    Post Op Vitals Reviewed: Yes          Staff: CRNA           BP   123/70   Temp      Pulse  73   Resp   16   SpO2   96%

## 2018-03-29 NOTE — ANESTHESIA PREPROCEDURE EVALUATION
Review of Systems/Medical History  Patient summary reviewed        Cardiovascular  EKG reviewed, Hyperlipidemia, Hypertension , CAD, ,   Comment: LEFT VENTRICLE:  Systolic function was normal by visual assessment  Ejection fraction was estimated to be 55 %  There was akinesis of the basal inferior and basal inferolateral wall(s)  Wall thickness was mildly to moderately increased  Doppler parameters were consistent with abnormal left ventricular relaxation (grade 1 diastolic dysfunction)      RIGHT VENTRICLE:  The ventricle was mildly dilated      MITRAL VALVE:  There was moderate annular calcification  There was trace regurgitation      AORTIC VALVE:  There was mild regurgitation      TRICUSPID VALVE:  There was trace regurgitation  ,  Pulmonary  Negative pulmonary ROS        GI/Hepatic  Negative GI/hepatic ROS          Negative  ROS        Endo/Other  Negative endo/other ROS      GYN  Negative gynecology ROS          Hematology  Negative hematology ROS      Musculoskeletal  Negative musculoskeletal ROS        Neurology    TIA,    Psychology   Negative psychology ROS              Physical Exam    Airway    Mallampati score: II  TM Distance: >3 FB  Neck ROM: full     Dental       Cardiovascular  Cardiovascular exam normal    Pulmonary  Pulmonary exam normal     Other Findings        Anesthesia Plan  ASA Score- 3     Anesthesia Type- IV sedation with anesthesia with ASA Monitors  Additional Monitors:   Airway Plan:         Plan Factors-    Induction- intravenous  Postoperative Plan-     Informed Consent- Anesthetic plan and risks discussed with patient  I personally reviewed this patient with the CRNA  Discussed and agreed on the Anesthesia Plan with the CRNA  Bri Santiago

## 2018-03-29 NOTE — DISCHARGE INSTRUCTIONS
Transesophageal Echocardiogram   WHAT YOU NEED TO KNOW:   A transesophageal echocardiogram (ADDIE) is a procedure used to check for problems with your heart  It will also show any problems in the blood vessels near your heart  Sound waves are sent to the heart through a tube inserted into your throat  The sound waves show the structure and function of your heart through pictures on a monitor  DISCHARGE INSTRUCTIONS:   Rest:  Rest until you are fully awake  You may be sleepy for a while after your procedure  Do not eat or drink until you are able to swallow normally:  Start with soft foods, such as oatmeal, yogurt, or applesauce  Once you can eat soft foods easily, you may begin to eat solid foods  Follow up with your healthcare provider as directed:  Write down your questions so you remember to ask them during your visits  Contact your healthcare provider if:   · You have a fever or chills  · You taste blood in your mouth  · You have a severe sore throat or difficulty swallowing  · You have questions or concerns about your condition or care  Seek care immediately or call 911 if:   · You have any of the following signs of a heart attack:      ¨ Squeezing, pressure, or pain in your chest that lasts longer than 5 minutes or returns    ¨ Discomfort or pain in your back, neck, jaw, stomach, or arm     ¨ Trouble breathing    ¨ Nausea or vomiting    ¨ Lightheadedness or a sudden cold sweat, especially with chest pain or trouble breathing    © 2017 [address] [place] [address] Street is for End User's use only and may not be sold, redistributed or otherwise used for commercial purposes  All illustrations and images included in CareNotes® are the copyrighted property of A D A M , Inc  or Patrice Yancey  The above information is an  only  It is not intended as medical advice for individual conditions or treatments   Talk to your doctor, nurse or pharmacist before following any medical regimen to see if it is safe and effective for you  Moderate Sedation   WHAT YOU NEED TO KNOW:   Moderate sedation, or conscious sedation, is medicine used during procedures to help you feel relaxed and calm  You will be awake and able to follow directions without anxiety or pain  You will remember little to none of the procedure  You may feel tired, weak, or unsteady on your feet after you get sedation  You may also have trouble concentrating or short-term memory loss  These symptoms should go away in 24 hours or less  DISCHARGE INSTRUCTIONS:   Call 911 or have someone else call for any of the following:   · You have sudden trouble breathing  · You cannot be woken  Seek care immediately if:   · You have a severe headache or dizziness  · Your heart is beating faster than usual   Contact your healthcare provider if:   · You have a fever  · You have nausea or are vomiting for more than 8 hours after the procedure  · Your skin is itchy, swollen, or you have a rash  · You have questions or concerns about your condition or care  Self-care:   · Have someone stay with you for 24 hours  This person can drive you to errands and help you do things around the house  This person can also watch for problems  · Rest and do quiet activities for 24 hours  Do not exercise, ride a bike, or play sports  Stand up slowly to prevent dizziness and falls  Take short walks around the house with another person  Slowly return to your usual activities the next day  · Do not drive or use dangerous machines or tools for 24 hours  You may injure yourself or others  Examples include a lawnmower, saw, or drill  Do not return to work for 24 hours if you use dangerous machines or tools for work  · Do not make important decisions for 24 hours  For example, do not sign important papers or invest money  · Drink liquids as directed  Liquids help flush the sedation medicine out of your body   Ask how much liquid to drink each day and which liquids are best for you  · Eat small, frequent meals to prevent nausea and vomiting  Start with clear liquids such as juice or broth  If you do not vomit after clear liquids, you can eat your usual foods  · Do not drink alcohol or take medicines that make you drowsy  This includes medicines that help you sleep and anxiety medicines  Ask your healthcare provider if it is safe for you to take pain medicine  Follow up with your healthcare provider as directed:  Write down your questions so you remember to ask them during your visits  © 2017 [address] Information is for End User's use only and may not be sold, redistributed or otherwise used for commercial purposes  All illustrations and images included in CareNotes® are the copyrighted property of A [employer] A [email or web address] , [employer]  or Patrice Yancey  The above information is an  only  It is not intended as medical advice for individual conditions or treatments  Talk to your doctor, nurse or pharmacist before following any medical regimen to see if it is safe and effective for you

## 2018-04-26 ENCOUNTER — OFFICE VISIT (OUTPATIENT)
Dept: CARDIOLOGY CLINIC | Facility: CLINIC | Age: 83
End: 2018-04-26
Payer: MEDICARE

## 2018-04-26 VITALS
HEART RATE: 66 BPM | BODY MASS INDEX: 25.01 KG/M2 | SYSTOLIC BLOOD PRESSURE: 128 MMHG | HEIGHT: 68 IN | DIASTOLIC BLOOD PRESSURE: 80 MMHG | WEIGHT: 165 LBS

## 2018-04-26 DIAGNOSIS — E78.2 MIXED HYPERLIPIDEMIA: ICD-10-CM

## 2018-04-26 DIAGNOSIS — I25.10 CORONARY ARTERY DISEASE INVOLVING NATIVE CORONARY ARTERY OF NATIVE HEART WITHOUT ANGINA PECTORIS: Primary | Chronic | ICD-10-CM

## 2018-04-26 DIAGNOSIS — I71.2 THORACIC AORTIC ANEURYSM WITHOUT RUPTURE (HCC): Chronic | ICD-10-CM

## 2018-04-26 DIAGNOSIS — I10 ESSENTIAL HYPERTENSION: ICD-10-CM

## 2018-04-26 PROCEDURE — 99214 OFFICE O/P EST MOD 30 MIN: CPT | Performed by: INTERNAL MEDICINE
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
## 2018-05-15 ENCOUNTER — TELEPHONE (OUTPATIENT)
Dept: CARDIOLOGY CLINIC | Facility: CLINIC | Age: 83
End: 2018-05-15

## 2018-05-15 NOTE — TELEPHONE ENCOUNTER
Naomi Wynn called with an update since last ov 4/6 when metoprolol dose was cut in half and plavix changed to QOD  He feels ok, but said he can't determine if it's helping his cold intolerance, since the cold weather is over,  but wants to let you know he feels ok on current doses

## 2018-05-16 DIAGNOSIS — I10 ESSENTIAL HYPERTENSION: Primary | ICD-10-CM

## 2018-05-16 RX ORDER — LISINOPRIL 20 MG/1
TABLET ORAL
Qty: 30 TABLET | Refills: 5 | Status: SHIPPED | OUTPATIENT
Start: 2018-05-16 | End: 2018-01-01 | Stop reason: SDUPTHER

## 2018-05-25 ENCOUNTER — TELEPHONE (OUTPATIENT)
Dept: CARDIOLOGY CLINIC | Facility: CLINIC | Age: 83
End: 2018-05-25

## 2018-05-25 NOTE — TELEPHONE ENCOUNTER
Patient notified verbalizes understanding of directions to see PCP for evaluation of numbness and tingling of hands as per Dr Ariadna Ortiz

## 2018-05-25 NOTE — TELEPHONE ENCOUNTER
Patient called stating he has been having tingling and numbness in both hands pretty consistently for approximately 3- 4 weeks now  Patient was in for an appointment on 4/26/18  Patient was taking Metoprolol 50 mg BID and Clopidogrel 75mg daily prior to last visit  Patient stated during visit he was instructed to take Metoprolol 50 mg 0 5 tablets BID and Clopidogrel 75 mg every other day       Please advise

## 2018-06-01 ENCOUNTER — OFFICE VISIT (OUTPATIENT)
Dept: FAMILY MEDICINE CLINIC | Facility: HOSPITAL | Age: 83
End: 2018-06-01
Payer: MEDICARE

## 2018-06-01 VITALS
DIASTOLIC BLOOD PRESSURE: 70 MMHG | OXYGEN SATURATION: 97 % | HEART RATE: 56 BPM | SYSTOLIC BLOOD PRESSURE: 122 MMHG | WEIGHT: 166.5 LBS | BODY MASS INDEX: 25.23 KG/M2 | HEIGHT: 68 IN

## 2018-06-01 DIAGNOSIS — G56.03 BILATERAL CARPAL TUNNEL SYNDROME: Primary | ICD-10-CM

## 2018-06-01 PROCEDURE — 99213 OFFICE O/P EST LOW 20 MIN: CPT | Performed by: INTERNAL MEDICINE
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
## 2018-07-06 ENCOUNTER — TELEPHONE (OUTPATIENT)
Dept: FAMILY MEDICINE CLINIC | Facility: HOSPITAL | Age: 83
End: 2018-07-06

## 2018-07-13 ENCOUNTER — OFFICE VISIT (OUTPATIENT)
Dept: FAMILY MEDICINE CLINIC | Facility: HOSPITAL | Age: 83
End: 2018-07-13
Payer: MEDICARE

## 2018-07-13 VITALS
TEMPERATURE: 97.9 F | WEIGHT: 165.5 LBS | OXYGEN SATURATION: 97 % | BODY MASS INDEX: 25.08 KG/M2 | SYSTOLIC BLOOD PRESSURE: 130 MMHG | HEIGHT: 68 IN | DIASTOLIC BLOOD PRESSURE: 70 MMHG | HEART RATE: 61 BPM

## 2018-07-13 DIAGNOSIS — I51.9 CARDIAC DISEASE: ICD-10-CM
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
## 2018-07-13 DIAGNOSIS — H53.8 BLURRY VISION, BILATERAL: ICD-10-CM

## 2018-07-13 DIAGNOSIS — E78.00 PURE HYPERCHOLESTEROLEMIA: ICD-10-CM

## 2018-07-13 DIAGNOSIS — I10 ESSENTIAL HYPERTENSION: ICD-10-CM

## 2018-07-13 PROBLEM — I25.10 ASCVD (ARTERIOSCLEROTIC CARDIOVASCULAR DISEASE): Status: ACTIVE | Noted: 2017-02-05

## 2018-07-13 PROCEDURE — 99213 OFFICE O/P EST LOW 20 MIN: CPT | Performed by: INTERNAL MEDICINE

## 2018-07-13 NOTE — PROGRESS NOTES
Assessment/Plan:       Diagnoses and all orders for this visit:    Tick bite, initial encounter    Blurry vision, bilateral    Pure hypercholesterolemia  -     Lipid Panel with Direct LDL reflex; Future    Essential hypertension  -     CBC; Future  -     Comprehensive metabolic panel; Future  -     TSH, 3rd generation; Future    Cardiac disease          All of the above diagnoses have been assessed  Additional COMMENTS/PLAN: This does not look like a tick bite    I have asked him to take his blood pressure feet gets symptoms of lightheadedness back  Will see back in 3 months with full blood work prior to that  Subjective:      Patient ID: Lianna Cordova is a 80 y o  male  HPI     Thinks he has a tick bite on his left arm  Did not Pick a tick off of it    Patient had another episode 3 weeks ago where when it was really hot, he developed blurry vision which lasted about 30 min  This was bilateral   He has had extensive workup for this  The following portions of the patient's history were revised and updated as appropriate: Problem list, allergies, med list, FH, SH, Past medical and surgical histories  Current Outpatient Prescriptions   Medication Sig Dispense Refill    aspirin (ECOTRIN LOW STRENGTH) 81 mg EC tablet Take 81 mg by mouth daily      atorvastatin (LIPITOR) 40 mg tablet Take 1 tablet by mouth daily 30 tablet 3    clindamycin (CLEOCIN) 150 mg capsule TAKE 4 CAPSULES 1 HOUR PRIOR TO APPOINTMENT ORALLY  4    clopidogrel (PLAVIX) 75 mg tablet Take 75 mg by mouth daily      lisinopril (ZESTRIL) 20 mg tablet TAKE 1 TABLET DAILY   30 tablet 5    metoprolol tartrate (LOPRESSOR) 50 mg tablet Take 50 mg by mouth 2 (two) times a day      montelukast (SINGULAIR) 10 mg tablet Take 10 mg by mouth daily at bedtime      nitroglycerin (NITROSTAT) 0 4 mg SL tablet PLACE 1 TABLET (0 4 MG TOTAL) UNDER THE TONGUE EVERY 5 (FIVE) MINUTES AS NEEDED FOR CHEST PAIN 100 tablet 0    tamsulosin (FLOMAX) 0 4 mg Take 0 4 mg by mouth daily with dinner       No current facility-administered medications for this visit  Review of Systems   All other systems reviewed and are negative  Objective:    /70 (BP Location: Left arm, Patient Position: Sitting, Cuff Size: Standard)   Pulse 61   Temp 97 9 °F (36 6 °C) (Tympanic)   Ht 5' 8" (1 727 m)   Wt 75 1 kg (165 lb 8 oz)   SpO2 97%   BMI 25 16 kg/m²      Physical Exam   Cardiovascular: Normal rate and regular rhythm  Pulmonary/Chest: Effort normal and breath sounds normal    Musculoskeletal: He exhibits no edema  Vitals reviewed  No visits with results within 2 Week(s) from this visit  Latest known visit with results is:   Appointment on 02/22/2018   Component Date Value Ref Range Status    Sodium 02/22/2018 143  136 - 145 mmol/L Final    Potassium 02/22/2018 4 0  3 5 - 5 3 mmol/L Final    Chloride 02/22/2018 107  100 - 108 mmol/L Final    CO2 02/22/2018 28  21 - 32 mmol/L Final    Anion Gap 02/22/2018 8  4 - 13 mmol/L Final    BUN 02/22/2018 18  5 - 25 mg/dL Final    Creatinine 02/22/2018 1 13  0 60 - 1 30 mg/dL Final    Standardized to IDMS reference method    Glucose 02/22/2018 64* 65 - 140 mg/dL Final      If the patient is fasting, the ADA then defines impaired fasting glucose as > 100 mg/dL and diabetes as > or equal to 123 mg/dL  Specimen collection should occur prior to Sulfasalazine administration due to the potential for falsely depressed results  Specimen collection should occur prior to Sulfapyridine administration due to the potential for falsely elevated results   Calcium 02/22/2018 8 7  8 3 - 10 1 mg/dL Final    AST 02/22/2018 18  5 - 45 U/L Final      Specimen collection should occur prior to Sulfasalazine administration due to the potential for falsely depressed results       ALT 02/22/2018 31  12 - 78 U/L Final      Specimen collection should occur prior to Sulfasalazine administration due to the potential for falsely depressed results       Alkaline Phosphatase 02/22/2018 76  46 - 116 U/L Final    Total Protein 02/22/2018 7 6  6 4 - 8 2 g/dL Final    Albumin 02/22/2018 3 6  3 5 - 5 0 g/dL Final    Total Bilirubin 02/22/2018 0 40  0 20 - 1 00 mg/dL Final    eGFR 02/22/2018 60  ml/min/1 73sq m Final    TSH 3RD GENERATON 02/22/2018 1 527  0 358 - 3 740 uIU/mL Final         Lenka Butcher MD

## 2018-07-15 DIAGNOSIS — J30.1 SEASONAL ALLERGIC RHINITIS DUE TO POLLEN: Primary | ICD-10-CM

## 2018-07-15 RX ORDER — MONTELUKAST SODIUM 10 MG/1
TABLET ORAL
Qty: 30 TABLET | Refills: 6 | Status: SHIPPED | OUTPATIENT
Start: 2018-07-15 | End: 2019-01-01 | Stop reason: SDUPTHER

## 2018-08-05 DIAGNOSIS — I25.118 CORONARY ARTERY DISEASE OF NATIVE ARTERY OF NATIVE HEART WITH STABLE ANGINA PECTORIS (HCC): Primary | ICD-10-CM

## 2018-08-06 RX ORDER — CLOPIDOGREL BISULFATE 75 MG/1
TABLET ORAL
Qty: 30 TABLET | Refills: 8 | Status: SHIPPED | OUTPATIENT
Start: 2018-08-06 | End: 2018-01-01 | Stop reason: HOSPADM

## 2018-08-10 ENCOUNTER — LAB (OUTPATIENT)
Dept: LAB | Facility: HOSPITAL | Age: 83
End: 2018-08-10
Attending: INTERNAL MEDICINE
Payer: MEDICARE

## 2018-08-10 DIAGNOSIS — E78.00 PURE HYPERCHOLESTEROLEMIA: ICD-10-CM

## 2018-08-10 DIAGNOSIS — I10 ESSENTIAL HYPERTENSION: ICD-10-CM

## 2018-08-10 LAB
ALBUMIN SERPL BCP-MCNC: 3.5 G/DL (ref 3.5–5)
ALP SERPL-CCNC: 81 U/L (ref 46–116)
ALT SERPL W P-5'-P-CCNC: 41 U/L (ref 12–78)
ANION GAP SERPL CALCULATED.3IONS-SCNC: 9 MMOL/L (ref 4–13)
AST SERPL W P-5'-P-CCNC: 20 U/L (ref 5–45)
BILIRUB SERPL-MCNC: 0.6 MG/DL (ref 0.2–1)
BUN SERPL-MCNC: 22 MG/DL (ref 5–25)
CALCIUM SERPL-MCNC: 9 MG/DL (ref 8.3–10.1)
CHLORIDE SERPL-SCNC: 106 MMOL/L (ref 100–108)
CHOLEST SERPL-MCNC: 104 MG/DL (ref 50–200)
CO2 SERPL-SCNC: 26 MMOL/L (ref 21–32)
CREAT SERPL-MCNC: 1.07 MG/DL (ref 0.6–1.3)
ERYTHROCYTE [DISTWIDTH] IN BLOOD BY AUTOMATED COUNT: 13.4 % (ref 11.6–15.1)
GFR SERPL CREATININE-BSD FRML MDRD: 64 ML/MIN/1.73SQ M
GLUCOSE P FAST SERPL-MCNC: 100 MG/DL (ref 65–99)
HCT VFR BLD AUTO: 40.7 % (ref 36.5–49.3)
HDLC SERPL-MCNC: 37 MG/DL (ref 40–60)
HGB BLD-MCNC: 14.2 G/DL (ref 12–17)
LDLC SERPL CALC-MCNC: 40 MG/DL (ref 0–100)
MCH RBC QN AUTO: 30.8 PG (ref 26.8–34.3)
MCHC RBC AUTO-ENTMCNC: 34.9 G/DL (ref 31.4–37.4)
MCV RBC AUTO: 88 FL (ref 82–98)
PLATELET # BLD AUTO: 240 THOUSANDS/UL (ref 149–390)
PMV BLD AUTO: 9.6 FL (ref 8.9–12.7)
POTASSIUM SERPL-SCNC: 4.2 MMOL/L (ref 3.5–5.3)
PROT SERPL-MCNC: 7.4 G/DL (ref 6.4–8.2)
RBC # BLD AUTO: 4.61 MILLION/UL (ref 3.88–5.62)
SODIUM SERPL-SCNC: 141 MMOL/L (ref 136–145)
TRIGL SERPL-MCNC: 134 MG/DL
TSH SERPL DL<=0.05 MIU/L-ACNC: 2.21 UIU/ML (ref 0.36–3.74)
WBC # BLD AUTO: 8.46 THOUSAND/UL (ref 4.31–10.16)

## 2018-08-10 PROCEDURE — 84443 ASSAY THYROID STIM HORMONE: CPT

## 2018-08-10 PROCEDURE — 80053 COMPREHEN METABOLIC PANEL: CPT

## 2018-08-10 PROCEDURE — 80061 LIPID PANEL: CPT

## 2018-08-10 PROCEDURE — 36415 COLL VENOUS BLD VENIPUNCTURE: CPT

## 2018-08-10 PROCEDURE — 85027 COMPLETE CBC AUTOMATED: CPT

## 2018-08-20 ENCOUNTER — TELEPHONE (OUTPATIENT)
Dept: CARDIOLOGY CLINIC | Facility: CLINIC | Age: 83
End: 2018-08-20

## 2018-08-20 NOTE — TELEPHONE ENCOUNTER
Pt called regarding a concern with a recurrent symptom of dizziness with vision disturbance  Pt states he has discussed this symptom in past with Dr Chio Del Castillo which had occurred intermittently during hot weather and exertion but would usually resolve within a half hour  Pt states on 8/19 @ 10:30am he became dizzy with an associated vision disturbance that caused him to not be able to see his TV screen clearly  Pt states that this episode took at least an hour to resolve and that he had been sitting in a chair without exerting himself  Pt states he took his blood pressure and believes the reading to have been appx 145/65  Pt denied having CP, SOB, or palpitations  Pt did not seek medical treatment at the time of occurrence for these symptoms  Advised pt that I would relay these symptoms to Dr Chio Del Castillo but that if another episode should occur that he felt was urgent he should seek treatment at urgent care or ER, or call his PCP for an appt in the meanwhile to review these symptoms if he continues to be concerned  Pt verbalized understanding  Pt's last OV was 05/25/2018, next OV is 10/24/2018

## 2018-08-21 ENCOUNTER — OFFICE VISIT (OUTPATIENT)
Dept: FAMILY MEDICINE CLINIC | Facility: HOSPITAL | Age: 83
End: 2018-08-21
Payer: MEDICARE

## 2018-08-21 VITALS
BODY MASS INDEX: 24.63 KG/M2 | DIASTOLIC BLOOD PRESSURE: 78 MMHG | OXYGEN SATURATION: 98 % | SYSTOLIC BLOOD PRESSURE: 120 MMHG | HEART RATE: 61 BPM | WEIGHT: 162.5 LBS | HEIGHT: 68 IN

## 2018-08-21 DIAGNOSIS — H53.8 BLURRY VISION, BILATERAL: Primary | ICD-10-CM

## 2018-08-21 DIAGNOSIS — F41.9 ANXIETY: Primary | ICD-10-CM

## 2018-08-21 PROCEDURE — 99214 OFFICE O/P EST MOD 30 MIN: CPT | Performed by: INTERNAL MEDICINE

## 2018-08-21 RX ORDER — LORAZEPAM 1 MG/1
TABLET ORAL
Qty: 1 TABLET | Refills: 0 | Status: SHIPPED | OUTPATIENT
Start: 2018-08-21 | End: 2018-08-29 | Stop reason: ALTCHOICE

## 2018-08-21 NOTE — PROGRESS NOTES
Assessment/Plan:       There are no diagnoses linked to this encounter  All of the above diagnoses have been assessed  Additional COMMENTS/PLAN: ***      Subjective:      Patient ID: Amy Hoffmann is a 80 y o  male  HPI    The following portions of the patient's history were revised and updated as appropriate: Problem list, allergies, med list, FH, SH, Past medical and surgical histories  Current Outpatient Prescriptions   Medication Sig Dispense Refill    aspirin (ECOTRIN LOW STRENGTH) 81 mg EC tablet Take 81 mg by mouth daily      atorvastatin (LIPITOR) 40 mg tablet Take 1 tablet by mouth daily 30 tablet 3    clindamycin (CLEOCIN) 150 mg capsule TAKE 4 CAPSULES 1 HOUR PRIOR TO APPOINTMENT ORALLY  4    clopidogrel (PLAVIX) 75 mg tablet TAKE 1 TABLET DAILY 30 tablet 8    lisinopril (ZESTRIL) 20 mg tablet TAKE 1 TABLET DAILY  30 tablet 5    metoprolol tartrate (LOPRESSOR) 50 mg tablet Take 50 mg by mouth 2 (two) times a day      montelukast (SINGULAIR) 10 mg tablet TAKE 1 TABLET DAILY  30 tablet 6    nitroglycerin (NITROSTAT) 0 4 mg SL tablet PLACE 1 TABLET (0 4 MG TOTAL) UNDER THE TONGUE EVERY 5 (FIVE) MINUTES AS NEEDED FOR CHEST PAIN 100 tablet 0    tamsulosin (FLOMAX) 0 4 mg Take 0 4 mg by mouth daily with dinner       No current facility-administered medications for this visit          Review of Systems      Objective:    /70 (BP Location: Left arm, Patient Position: Sitting, Cuff Size: Standard)   Pulse 61   Ht 5' 8" (1 727 m)   Wt 73 7 kg (162 lb 8 oz)   SpO2 98%   BMI 24 71 kg/m²      Physical Exam      Lab on 08/10/2018   Component Date Value Ref Range Status    WBC 08/10/2018 8 46  4 31 - 10 16 Thousand/uL Final    RBC 08/10/2018 4 61  3 88 - 5 62 Million/uL Final    Hemoglobin 08/10/2018 14 2  12 0 - 17 0 g/dL Final    Hematocrit 08/10/2018 40 7  36 5 - 49 3 % Final    MCV 08/10/2018 88  82 - 98 fL Final    MCH 08/10/2018 30 8  26 8 - 34 3 pg Final    MCHC 08/10/2018 34 9  31 4 - 37 4 g/dL Final    RDW 08/10/2018 13 4  11 6 - 15 1 % Final    Platelets 86/28/0339 240  149 - 390 Thousands/uL Final    MPV 08/10/2018 9 6  8 9 - 12 7 fL Final    Sodium 08/10/2018 141  136 - 145 mmol/L Final    Potassium 08/10/2018 4 2  3 5 - 5 3 mmol/L Final    Chloride 08/10/2018 106  100 - 108 mmol/L Final    CO2 08/10/2018 26  21 - 32 mmol/L Final    Anion Gap 08/10/2018 9  4 - 13 mmol/L Final    BUN 08/10/2018 22  5 - 25 mg/dL Final    Creatinine 08/10/2018 1 07  0 60 - 1 30 mg/dL Final    Standardized to IDMS reference method    Glucose, Fasting 08/10/2018 100* 65 - 99 mg/dL Final      Specimen collection should occur prior to Sulfasalazine administration due to the potential for falsely depressed results  Specimen collection should occur prior to Sulfapyridine administration due to the potential for falsely elevated results   Calcium 08/10/2018 9 0  8 3 - 10 1 mg/dL Final    AST 08/10/2018 20  5 - 45 U/L Final      Specimen collection should occur prior to Sulfasalazine administration due to the potential for falsely depressed results   ALT 08/10/2018 41  12 - 78 U/L Final      Specimen collection should occur prior to Sulfasalazine administration due to the potential for falsely depressed results   Alkaline Phosphatase 08/10/2018 81  46 - 116 U/L Final    Total Protein 08/10/2018 7 4  6 4 - 8 2 g/dL Final    Albumin 08/10/2018 3 5  3 5 - 5 0 g/dL Final    Total Bilirubin 08/10/2018 0 60  0 20 - 1 00 mg/dL Final    eGFR 08/10/2018 64  ml/min/1 73sq m Final    TSH 3RD GENERATON 08/10/2018 2 212  0 358 - 3 740 uIU/mL Final    Using supplements with high doses of biotin 20 to more than 300 times greater than the adequate daily intake for adults of 30 mcg/day as established by the [place] [employer], can cause falsely depress results      Cholesterol 08/10/2018 104  50 - 200 mg/dL Final      Cholesterol:       Desirable         <200 mg/dl       Borderline         200-239 mg/dl       High              >239           Triglycerides 08/10/2018 134  <=150 mg/dL Final      Triglyceride:     Normal          <150 mg/dl     Borderline High 150-199 mg/dl     High            200-499 mg/dl        Very High       >499 mg/dl    Specimen collection should occur prior to N-Acetylcysteine or Metamizole administration due to the potential for falsely depressed results   HDL, Direct 08/10/2018 37* 40 - 60 mg/dL Final      HDL Cholesterol:       High    >60 mg/dL       Low     <41 mg/dL  Specimen collection should occur prior to Metamizole administration due to the potential for falsley depressed results   LDL Calculated 08/10/2018 40  0 - 100 mg/dL Final      LDL Cholesterol:     Optimal           <100 mg/dl     Near Optimal      100-129 mg/dl     Above Optimal       Borderline High 130-159 mg/dl       High            160-189 mg/dl       Very High       >189 mg/dl         This screening LDL is a calculated result  It does not have the accuracy of the Direct Measured LDL in the monitoring of patients with hyperlipidemia and/or statin therapy  Direct Measure LDL (XFH627) must be ordered separately in these patients           Alexsandra Castro MD

## 2018-08-21 NOTE — TELEPHONE ENCOUNTER
Received a call from pt this morning stating he had symptoms again yesterday  Pt again denied seeking urgent/emergent treatment for his symptoms at the time of the occurrence  Pt requested an appointment with Dr Main Rodriguez - informed pt that the soonest available is 08/29/2018 @1600  Pt accepted appt but is going to be seen by Dr Zoya Mortensen today regarding his symptoms  Again advised pt to seek treatment at urgent care or ER if he has a return of symptoms that he feels needs immediate evaluation  Pt verbalized understanding

## 2018-08-21 NOTE — PROGRESS NOTES
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  This Sunday, when sitting on chair, had blurred vision  This lasted about 1 hour to clear  Was able to stand and walk  Did have a small HA  Had similar episode yesterday  Took BP and was 411 systolic  No palpitions  Never had migraine  No other neuro sxs  The following portions of the patient's history were revised and updated as appropriate: Problem list, allergies, med list, FH, SH, Past medical and surgical histories  Current Outpatient Prescriptions   Medication Sig Dispense Refill    aspirin (ECOTRIN LOW STRENGTH) 81 mg EC tablet Take 81 mg by mouth daily      atorvastatin (LIPITOR) 40 mg tablet Take 1 tablet by mouth daily 30 tablet 3    clindamycin (CLEOCIN) 150 mg capsule TAKE 4 CAPSULES 1 HOUR PRIOR TO APPOINTMENT ORALLY  4    clopidogrel (PLAVIX) 75 mg tablet TAKE 1 TABLET DAILY 30 tablet 8    lisinopril (ZESTRIL) 20 mg tablet TAKE 1 TABLET DAILY  30 tablet 5    metoprolol tartrate (LOPRESSOR) 50 mg tablet Take 50 mg by mouth 2 (two) times a day      montelukast (SINGULAIR) 10 mg tablet TAKE 1 TABLET DAILY  30 tablet 6    nitroglycerin (NITROSTAT) 0 4 mg SL tablet PLACE 1 TABLET (0 4 MG TOTAL) UNDER THE TONGUE EVERY 5 (FIVE) MINUTES AS NEEDED FOR CHEST PAIN 100 tablet 0    tamsulosin (FLOMAX) 0 4 mg Take 0 4 mg by mouth daily with dinner       No current facility-administered medications for this visit  Review of Systems   All other systems reviewed and are negative  Objective:    /78 (BP Location: Left arm, Patient Position: Sitting, Cuff Size: Standard)   Pulse 61   Ht 5' 8" (1 727 m)   Wt 73 7 kg (162 lb 8 oz)   SpO2 98%   BMI 24 71 kg/m²      Physical Exam   Constitutional: He is oriented to person, place, and time  He appears well-developed and well-nourished  Eyes: Pupils are equal, round, and reactive to light  Visual fields normal   Neck: No thyromegaly present  Cardiovascular: Normal rate and regular rhythm  Pulmonary/Chest: Effort normal and breath sounds normal    Abdominal: Soft  Bowel sounds are normal    Musculoskeletal: He exhibits no edema  Neurological: He is alert and oriented to person, place, and time  Visual fields normal    No focal weakness    No cranial nerve deficit  Vitals reviewed  Lab on 08/10/2018   Component Date Value Ref Range Status    WBC 08/10/2018 8 46  4 31 - 10 16 Thousand/uL Final    RBC 08/10/2018 4 61  3 88 - 5 62 Million/uL Final    Hemoglobin 08/10/2018 14 2  12 0 - 17 0 g/dL Final    Hematocrit 08/10/2018 40 7  36 5 - 49 3 % Final    MCV 08/10/2018 88  82 - 98 fL Final    MCH 08/10/2018 30 8  26 8 - 34 3 pg Final    MCHC 08/10/2018 34 9  31 4 - 37 4 g/dL Final    RDW 08/10/2018 13 4  11 6 - 15 1 % Final    Platelets 54/77/9770 240  149 - 390 Thousands/uL Final    MPV 08/10/2018 9 6  8 9 - 12 7 fL Final    Sodium 08/10/2018 141  136 - 145 mmol/L Final    Potassium 08/10/2018 4 2  3 5 - 5 3 mmol/L Final    Chloride 08/10/2018 106  100 - 108 mmol/L Final    CO2 08/10/2018 26  21 - 32 mmol/L Final    Anion Gap 08/10/2018 9  4 - 13 mmol/L Final    BUN 08/10/2018 22  5 - 25 mg/dL Final    Creatinine 08/10/2018 1 07  0 60 - 1 30 mg/dL Final    Standardized to IDMS reference method    Glucose, Fasting 08/10/2018 100* 65 - 99 mg/dL Final      Specimen collection should occur prior to Sulfasalazine administration due to the potential for falsely depressed results   Specimen collection should occur prior to Sulfapyridine administration due to the potential for falsely elevated results   Calcium 08/10/2018 9 0  8 3 - 10 1 mg/dL Final    AST 08/10/2018 20  5 - 45 U/L Final      Specimen collection should occur prior to Sulfasalazine administration due to the potential for falsely depressed results   ALT 08/10/2018 41  12 - 78 U/L Final      Specimen collection should occur prior to Sulfasalazine administration due to the potential for falsely depressed results   Alkaline Phosphatase 08/10/2018 81  46 - 116 U/L Final    Total Protein 08/10/2018 7 4  6 4 - 8 2 g/dL Final    Albumin 08/10/2018 3 5  3 5 - 5 0 g/dL Final    Total Bilirubin 08/10/2018 0 60  0 20 - 1 00 mg/dL Final    eGFR 08/10/2018 64  ml/min/1 73sq m Final    TSH 3RD GENERATON 08/10/2018 2 212  0 358 - 3 740 uIU/mL Final    Using supplements with high doses of biotin 20 to more than 300 times greater than the adequate daily intake for adults of 30 mcg/day as established by the [place] [employer], can cause falsely depress results   Cholesterol 08/10/2018 104  50 - 200 mg/dL Final      Cholesterol:       Desirable         <200 mg/dl       Borderline         200-239 mg/dl       High              >239           Triglycerides 08/10/2018 134  <=150 mg/dL Final      Triglyceride:     Normal          <150 mg/dl     Borderline High 150-199 mg/dl     High            200-499 mg/dl        Very High       >499 mg/dl    Specimen collection should occur prior to N-Acetylcysteine or Metamizole administration due to the potential for falsely depressed results   HDL, Direct 08/10/2018 37* 40 - 60 mg/dL Final      HDL Cholesterol:       High    >60 mg/dL       Low     <41 mg/dL  Specimen collection should occur prior to Metamizole administration due to the potential for falsley depressed results      LDL Calculated 08/10/2018 40  0 - 100 mg/dL Final      LDL Cholesterol:     Optimal           <100 mg/dl     Near Optimal      100-129 mg/dl     Above Optimal       Borderline High 130-159 mg/dl       High            160-189 mg/dl       Very High       >189 mg/dl         This screening LDL is a calculated result  It does not have the accuracy of the Direct Measured LDL in the monitoring of patients with hyperlipidemia and/or statin therapy  Direct Measure LDL (CDD273) must be ordered separately in these patients           Belle Byrd MD

## 2018-08-21 NOTE — TELEPHONE ENCOUNTER
Patient wanted to know if he can still get steroid injection in his low back  He was unsure if the anesthesia would affect any of the upcoming testing he needs  I discuss this with Dr Rivera Escamilla and he states that it is ok for the patient to have injections  I called and relayed this to Mr Jessi Kam

## 2018-08-28 ENCOUNTER — TELEPHONE (OUTPATIENT)
Dept: FAMILY MEDICINE CLINIC | Facility: HOSPITAL | Age: 83
End: 2018-08-28

## 2018-08-28 NOTE — TELEPHONE ENCOUNTER
I spoke to [facility] on [facility] at [phone]- they are faxing results over to our office ,DD

## 2018-08-29 ENCOUNTER — OFFICE VISIT (OUTPATIENT)
Dept: CARDIOLOGY CLINIC | Facility: CLINIC | Age: 83
End: 2018-08-29
Payer: MEDICARE

## 2018-08-29 VITALS
DIASTOLIC BLOOD PRESSURE: 74 MMHG | SYSTOLIC BLOOD PRESSURE: 136 MMHG | BODY MASS INDEX: 25.19 KG/M2 | HEART RATE: 56 BPM | WEIGHT: 166.2 LBS | HEIGHT: 68 IN

## 2018-08-29 DIAGNOSIS — E78.00 PURE HYPERCHOLESTEROLEMIA: ICD-10-CM

## 2018-08-29 DIAGNOSIS — I25.10 CORONARY ARTERY DISEASE INVOLVING NATIVE CORONARY ARTERY OF NATIVE HEART WITHOUT ANGINA PECTORIS: ICD-10-CM

## 2018-08-29 DIAGNOSIS — I10 ESSENTIAL HYPERTENSION: ICD-10-CM

## 2018-08-29 DIAGNOSIS — I71.2 THORACIC AORTIC ANEURYSM WITHOUT RUPTURE (HCC): Primary | Chronic | ICD-10-CM

## 2018-08-29 PROCEDURE — 99214 OFFICE O/P EST MOD 30 MIN: CPT | Performed by: INTERNAL MEDICINE

## 2018-08-29 NOTE — PROGRESS NOTES
Cardiology Follow Up    Mirela John  1935  [id number]  [address] [facility] Evlyn Bosworth  3 [facility] [phone]    1  Thoracic aortic aneurysm without rupture ([facility] [address] )     2  Essential hypertension     3  Coronary artery disease involving native coronary artery of native heart without angina pectoris     4  Pure hypercholesterolemia         Interval History:   Cardiology follow-up  Patient has continues to experiencing intermittent episode of bilateral  vision blurriness  This last episode lasted for approximately 45 minutes to an hour and resolve spontaneously  He denies any focal neurological deficits elsewhere  Denies any palpitations, he did check his blood pressure in a was normal 145/70 with a normal heart rate  He has happened again the next day  He is quite concerned about it again  Symptoms are similar to what he experienced before, we did extensive workup for cardioembolism that was unrevealing  He is on dual antiplatelet therapy  He denies any chest pain or dyspnea  Denies any orthopnea or PND  Denies any significant stressful situation    In the past these episodes were 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 was shooting targets but not most recently 1    Patient Active Problem List   Diagnosis    Essential hypertension    Pure hypercholesterolemia    Depression    Chronic pain    Cardiac disease    ASCVD (arteriosclerotic cardiovascular disease)    Thoracic aortic aneurysm ([facility] [address] )    Blurry vision, bilateral    Bilateral carpal tunnel syndrome    Coronary artery disease involving native coronary artery of native heart without angina pectoris     Past Medical History:   Diagnosis Date    Anemia     Cardiac disease     Chronic pain     Coronary atherosclerosis     Depression     Last Assessed: 1/24/2017    Hearing impairment     Last Assessed; 3/20/2017    Hyperlipidemia     Last Assessed: 10/18/2017    Hypertension     Last Assessed: 1/5/2018    NSTEMI (non-ST elevated myocardial infarction) [facility]) 2006    Peptic ulcer     Prostate cancer ([facility] [address] )     Radiation burn     Thoracic aneurysm without mention of rupture     Last Assessed: 10/18/2017     Social History     Social History    Marital status:      Spouse name: N/A    Number of children: N/A    Years of education: N/A     Occupational History    Not on file  Social History Main Topics    Smoking status: Former Smoker     Types: Cigars    Smokeless tobacco: Never Used      Comment: 1 cigar daily / current smoker per [employer]    Alcohol use No      Comment: Recovering alcoholic    Drug use: No    Sexual activity: Not Currently     Other Topics Concern    Not on file     Social History Narrative    Wears a seat belt     Guns in the home    Lives with relatives    Living situation: supportive and safe    Never uses seat belt per [employer]    Working full time          No family history on file  Past Surgical History:   Procedure Laterality Date    ARTERY SURGERY  2006    Carotid Artery Catheterization    CARDIAC CATHETERIZATION  01/13/2006    at [facility] Dr Vivi Piedra and Dr Mecca Mcconnell, 20-30% arrowing of mid circumfles, 40-50% narrowing of LAD, 99% narrowing of RCA--partically successful angioplasty and stenting of RCA but entire lesion could not be covered with stents but was widely patent at end of procecure--1 Cyper CONCHITA placed and non CONCHITA were placed      COLONOSCOPY  2012    Complete    CORONARY ANGIOPLASTY WITH STENT PLACEMENT  2006    2 stents placed    JOINT REPLACEMENT      hip    OTHER SURGICAL HISTORY      surgery for bleeding ulcer    TONSILLECTOMY AND ADENOIDECTOMY         Current Outpatient Prescriptions:     aspirin (ECOTRIN LOW STRENGTH) 81 mg EC tablet, Take 81 mg by mouth daily, Disp: , Rfl:     atorvastatin (LIPITOR) 40 mg tablet, Take 1 tablet by mouth daily, Disp: 30 tablet, Rfl: 3   clopidogrel (PLAVIX) 75 mg tablet, TAKE 1 TABLET DAILY, Disp: 30 tablet, Rfl: 8    lisinopril (ZESTRIL) 20 mg tablet, TAKE 1 TABLET DAILY  , Disp: 30 tablet, Rfl: 5    metoprolol tartrate (LOPRESSOR) 50 mg tablet, Take 50 mg by mouth 2 (two) times a day, Disp: , Rfl:     montelukast (SINGULAIR) 10 mg tablet, TAKE 1 TABLET DAILY  , Disp: 30 tablet, Rfl: 6    tamsulosin (FLOMAX) 0 4 mg, Take 0 4 mg by mouth daily with dinner, Disp: , Rfl:     clindamycin (CLEOCIN) 150 mg capsule, TAKE 4 CAPSULES 1 HOUR PRIOR TO APPOINTMENT ORALLY, Disp: , Rfl: 4    nitroglycerin (NITROSTAT) 0 4 mg SL tablet, PLACE 1 TABLET (0 4 MG TOTAL) UNDER THE TONGUE EVERY 5 (FIVE) MINUTES AS NEEDED FOR CHEST PAIN (Patient not taking: Reported on 8/29/2018 ), Disp: 100 tablet, Rfl: 0  Allergies   Allergen Reactions    Aspirin GI Intolerance    Augmentin [Amoxicillin-Pot Clavulanate]        Labs:  Lab on 08/10/2018   Component Date Value    WBC 08/10/2018 8 46     RBC 08/10/2018 4 61     Hemoglobin 08/10/2018 14 2     Hematocrit 08/10/2018 40 7     MCV 08/10/2018 88     MCH 08/10/2018 30 8     MCHC 08/10/2018 34 9     RDW 08/10/2018 13 4     Platelets 90/38/5480 240     MPV 08/10/2018 9 6     Sodium 08/10/2018 141     Potassium 08/10/2018 4 2     Chloride 08/10/2018 106     CO2 08/10/2018 26     ANION GAP 08/10/2018 9     BUN 08/10/2018 22     Creatinine 08/10/2018 1 07     Glucose, Fasting 08/10/2018 100*    Calcium 08/10/2018 9 0     AST 08/10/2018 20     ALT 08/10/2018 41     Alkaline Phosphatase 08/10/2018 81     Total Protein 08/10/2018 7 4     Albumin 08/10/2018 3 5     Total Bilirubin 08/10/2018 0 60     eGFR 08/10/2018 64     TSH 3RD GENERATON 08/10/2018 2 212     Cholesterol 08/10/2018 104     Triglycerides 08/10/2018 134     HDL, Direct 08/10/2018 37*    LDL Calculated 08/10/2018 40      Imaging: No results found  Review of Systems:  Review of Systems   Eyes: Positive for visual disturbance  Respiratory: Negative for shortness of breath, wheezing and stridor  Cardiovascular: Negative for chest pain, palpitations and leg swelling  Psychiatric/Behavioral: Negative for confusion and sleep disturbance  The patient is not nervous/anxious  Physical Exam:  Physical Exam   Constitutional: He appears well-developed  No distress  Cardiovascular: Normal rate, regular rhythm and normal heart sounds  Exam reveals no gallop and no friction rub  No murmur heard  Skin: He is not diaphoretic  Discussion/Summary:  Coronary artery disease, cardiac catheterization last year revealed occluded RCA, diffuse in stent restenosis, the stents were placed several years ago at another institution, he did suffering iatrogenic radiation burn from that procedure  He did have a 50% marginal lesion in LAD lesion with well-developed left-to-right collaterals  Left ventricular systolic function is normal with inferior akinesis  Recent testing including ADDIE carotid duplex Holter monitor had been unrevealing  We could consider a long-term monitor but I do not think this symptoms are cardiogenic in nature as they are bilateral   He did have a ophthalmological evaluation earlier this year there was mostly unrevealing  I asked him to redo that, he will seek ophthalmological opinion in [place]  Continue current medications  Multiple questions were answered to his satisfaction

## 2018-09-17 ENCOUNTER — APPOINTMENT (OUTPATIENT)
Dept: LAB | Facility: HOSPITAL | Age: 83
End: 2018-09-17
Attending: INTERNAL MEDICINE
Payer: MEDICARE

## 2018-09-17 ENCOUNTER — TELEPHONE (OUTPATIENT)
Dept: FAMILY MEDICINE CLINIC | Facility: HOSPITAL | Age: 83
End: 2018-09-17

## 2018-09-17 DIAGNOSIS — R35.0 URINARY FREQUENCY: Primary | ICD-10-CM

## 2018-09-17 LAB
BACTERIA UR QL AUTO: ABNORMAL /HPF
BILIRUB UR QL STRIP: NEGATIVE
CLARITY UR: ABNORMAL
COLOR UR: YELLOW
GLUCOSE UR STRIP-MCNC: NEGATIVE MG/DL
HGB UR QL STRIP.AUTO: ABNORMAL
KETONES UR STRIP-MCNC: NEGATIVE MG/DL
LEUKOCYTE ESTERASE UR QL STRIP: ABNORMAL
NITRITE UR QL STRIP: POSITIVE
NON-SQ EPI CELLS URNS QL MICRO: ABNORMAL /HPF
PH UR STRIP.AUTO: 5.5 [PH] (ref 4.5–8)
PROT UR STRIP-MCNC: ABNORMAL MG/DL
RBC #/AREA URNS AUTO: ABNORMAL /HPF
SP GR UR STRIP.AUTO: >=1.03 (ref 1–1.03)
UROBILINOGEN UR QL STRIP.AUTO: 0.2 E.U./DL
WBC #/AREA URNS AUTO: ABNORMAL /HPF

## 2018-09-17 PROCEDURE — 81001 URINALYSIS AUTO W/SCOPE: CPT

## 2018-09-17 PROCEDURE — 87186 SC STD MICRODIL/AGAR DIL: CPT

## 2018-09-17 PROCEDURE — 87077 CULTURE AEROBIC IDENTIFY: CPT

## 2018-09-17 PROCEDURE — 87086 URINE CULTURE/COLONY COUNT: CPT

## 2018-09-17 NOTE — TELEPHONE ENCOUNTER
I spoke w/pt, for 24hrs he has been having urge/frequency, every 15 minutes  No blood, no flank pain, no chills  slight burning  Per Dr Governor Ocampo, pt needs UA, c&s

## 2018-09-17 NOTE — TELEPHONE ENCOUNTER
See call, is this something you wish to address in Dr Melissa Crowder absence? Message does not have much info

## 2018-09-18 ENCOUNTER — HOSPITAL ENCOUNTER (EMERGENCY)
Facility: HOSPITAL | Age: 83
Discharge: HOME | End: 2018-09-18
Attending: EMERGENCY MEDICINE
Payer: MEDICARE

## 2018-09-18 VITALS
TEMPERATURE: 97.8 F | SYSTOLIC BLOOD PRESSURE: 149 MMHG | HEART RATE: 63 BPM | WEIGHT: 170 LBS | DIASTOLIC BLOOD PRESSURE: 69 MMHG | RESPIRATION RATE: 18 BRPM | OXYGEN SATURATION: 98 % | BODY MASS INDEX: 25.85 KG/M2

## 2018-09-18 DIAGNOSIS — N30.90 CYSTITIS: Primary | ICD-10-CM

## 2018-09-18 DIAGNOSIS — N30.01 ACUTE CYSTITIS WITH HEMATURIA: Primary | ICD-10-CM

## 2018-09-18 DIAGNOSIS — N39.0 UTI (URINARY TRACT INFECTION): Primary | ICD-10-CM

## 2018-09-18 PROCEDURE — 99283 EMERGENCY DEPT VISIT LOW MDM: CPT

## 2018-09-18 RX ORDER — CEPHALEXIN 250 MG/1
500 CAPSULE ORAL EVERY 12 HOURS SCHEDULED
Qty: 28 CAPSULE | Refills: 0 | Status: SHIPPED | OUTPATIENT
Start: 2018-09-18 | End: 2018-09-21 | Stop reason: ALTCHOICE

## 2018-09-18 RX ORDER — PHENAZOPYRIDINE HYDROCHLORIDE 100 MG/1
100 TABLET, FILM COATED ORAL ONCE
Status: COMPLETED | OUTPATIENT
Start: 2018-09-18 | End: 2018-09-18

## 2018-09-18 RX ORDER — PHENAZOPYRIDINE HYDROCHLORIDE 95 MG/1
95 TABLET ORAL 3 TIMES DAILY PRN
Qty: 10 TABLET | Refills: 0 | Status: SHIPPED | OUTPATIENT
Start: 2018-09-18 | End: 2018-01-01 | Stop reason: CLARIF

## 2018-09-18 RX ORDER — CIPROFLOXACIN 500 MG/1
500 TABLET, FILM COATED ORAL EVERY 12 HOURS SCHEDULED
Qty: 14 TABLET | Refills: 0 | Status: SHIPPED | OUTPATIENT
Start: 2018-09-18 | End: 2018-09-25

## 2018-09-18 RX ORDER — CEPHALEXIN 250 MG/1
500 CAPSULE ORAL ONCE
Status: COMPLETED | OUTPATIENT
Start: 2018-09-18 | End: 2018-09-18

## 2018-09-18 RX ADMIN — PHENAZOPYRIDINE HYDROCHLORIDE 100 MG: 100 TABLET ORAL at 05:38

## 2018-09-18 RX ADMIN — CEPHALEXIN 500 MG: 250 CAPSULE ORAL at 05:37

## 2018-09-18 NOTE — ED NOTES
Relates that yesterday his doctor had his give a urine sample to the lab as he was having the feeling that he had to urinate and could not do so; today he saw some blood when he did urinate; states the urine sample is in our lab from yesterday       Tiburcio Baird RN  09/18/18 5103
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
## 2018-09-18 NOTE — DISCHARGE INSTRUCTIONS
Urinary Tract Infection in Men, Ambulatory Care   GENERAL INFORMATION:   A urinary tract infection (UTI)  is caused by bacteria that get inside your urinary tract  Most bacteria that enter your urinary tract are expelled when you urinate  If the bacteria stay in your urinary tract, you may get an infection  Your urinary tract includes your kidneys, ureters, bladder, and urethra  Urine is made in your kidneys, and it flows from the ureters to the bladder  Urine leaves the bladder through the urethra  A UTI is more common in your lower urinary tract, which includes your bladder and urethra  Common symptoms include the following:   · Urinating more often or waking from sleep to urinate    · Pain or burning when you urinate    · Pain or pressure in your lower abdomen    · Urine that smells bad    · Leaking urine  Seek immediate care for the following symptoms:   · Urinating very little or not at all    · Vomiting    · Shaking chills with a fever    · Side or back pain that gets worse  Treatment for a UTI  may include medicines to treat a bacterial infection  You may also need medicines to decrease pain and burning, or decrease the urge to urinate often  Prevent a UTI:   · Urinate when you feel the urge  Do not hold your urine  Urinate as soon as you feel you have to  · Drink liquids as directed  Ask how much liquid to drink each day and which liquids are best for you  You may need to drink more fluids than usual to help flush out the bacteria  Do not drink alcohol, caffeine, and citrus juices  These can irritate your bladder and increase your symptoms  · Apply heat  on your abdomen for 20 to 30 minutes every 2 hours for as many days as directed  Heat helps decrease discomfort and pressure in your bladder  Follow up with your healthcare provider as directed:  Write down your questions so you remember to ask them during your visits  CARE AGREEMENT:   You have the right to help plan your care   Learn about your health condition and how it may be treated  Discuss treatment options with your caregivers to decide what care you want to receive  You always have the right to refuse treatment  The above information is an  only  It is not intended as medical advice for individual conditions or treatments  Talk to your doctor, nurse or pharmacist before following any medical regimen to see if it is safe and effective for you  © 2014 [address] is for End User's use only and may not be sold, redistributed or otherwise used for commercial purposes  All illustrations and images included in CareNotes® are the copyrighted property of A D A M , Inc  or Patrice Yancey

## 2018-09-19 LAB — BACTERIA UR CULT: ABNORMAL

## 2018-09-21 ENCOUNTER — OFFICE VISIT (OUTPATIENT)
Dept: FAMILY MEDICINE CLINIC | Facility: HOSPITAL | Age: 83
End: 2018-09-21
Payer: MEDICARE

## 2018-09-21 VITALS
BODY MASS INDEX: 24.48 KG/M2 | HEART RATE: 62 BPM | HEIGHT: 68 IN | DIASTOLIC BLOOD PRESSURE: 70 MMHG | WEIGHT: 161.5 LBS | SYSTOLIC BLOOD PRESSURE: 120 MMHG

## 2018-09-21 DIAGNOSIS — Z23 NEED FOR PNEUMOCOCCAL VACCINATION: ICD-10-CM

## 2018-09-21 DIAGNOSIS — N39.0 URINARY TRACT INFECTION WITHOUT HEMATURIA, SITE UNSPECIFIED: Primary | ICD-10-CM

## 2018-09-21 DIAGNOSIS — Z85.46 H/O PROSTATE CANCER: ICD-10-CM

## 2018-09-21 DIAGNOSIS — Z23 NEED FOR INFLUENZA VACCINATION: ICD-10-CM

## 2018-09-21 PROBLEM — H53.8 BLURRY VISION, BILATERAL: Status: RESOLVED | Noted: 2018-02-22 | Resolved: 2018-09-21

## 2018-09-21 PROBLEM — G43.109 RETINAL MIGRAINE: Status: ACTIVE | Noted: 2017-02-05

## 2018-09-21 PROCEDURE — 99213 OFFICE O/P EST LOW 20 MIN: CPT | Performed by: INTERNAL MEDICINE

## 2018-09-21 PROCEDURE — G0009 ADMIN PNEUMOCOCCAL VACCINE: HCPCS

## 2018-09-21 PROCEDURE — G0008 ADMIN INFLUENZA VIRUS VAC: HCPCS

## 2018-09-21 PROCEDURE — 90670 PCV13 VACCINE IM: CPT

## 2018-09-21 PROCEDURE — 90662 IIV NO PRSV INCREASED AG IM: CPT

## 2018-09-21 NOTE — PROGRESS NOTES
Assessment/Plan:       Diagnoses and all orders for this visit:    Urinary tract infection without hematuria, site unspecified    H/O prostate cancer          All of the above diagnoses have been assessed  Additional COMMENTS/PLAN: doing better    Will see back in 3 months  Subjective:      Patient ID: Corina Petersen is a 80 y o  male  HPI     Blurred vision-did see retinal specialist  Made dx of optic migraine  UTI-was in ED this week  This is better on AB  Will get US for PVR    The following portions of the patient's history were revised and updated as appropriate: Problem list, allergies, med list, FH, SH, Past medical and surgical histories  Current Outpatient Prescriptions   Medication Sig Dispense Refill    aspirin (ECOTRIN LOW STRENGTH) 81 mg EC tablet Take 81 mg by mouth daily      atorvastatin (LIPITOR) 40 mg tablet Take 1 tablet by mouth daily 30 tablet 3    ciprofloxacin (CIPRO) 500 mg tablet Take 1 tablet (500 mg total) by mouth every 12 (twelve) hours for 7 days 14 tablet 0    clopidogrel (PLAVIX) 75 mg tablet TAKE 1 TABLET DAILY 30 tablet 8    lisinopril (ZESTRIL) 20 mg tablet TAKE 1 TABLET DAILY  30 tablet 5    metoprolol tartrate (LOPRESSOR) 50 mg tablet Take 50 mg by mouth 2 (two) times a day      montelukast (SINGULAIR) 10 mg tablet TAKE 1 TABLET DAILY  30 tablet 6    nitroglycerin (NITROSTAT) 0 4 mg SL tablet PLACE 1 TABLET (0 4 MG TOTAL) UNDER THE TONGUE EVERY 5 (FIVE) MINUTES AS NEEDED FOR CHEST PAIN 100 tablet 0    phenazopyridine (PYRIDIUM) 95 MG tablet Take 1 tablet (95 mg total) by mouth 3 (three) times a day as needed for bladder spasms 10 tablet 0    tamsulosin (FLOMAX) 0 4 mg Take 0 4 mg by mouth daily with dinner       No current facility-administered medications for this visit          Review of Systems      Objective:    /60 (BP Location: Left arm, Patient Position: Sitting, Cuff Size: Standard)   Pulse 62   Ht 5' 8" (1 727 m)   Wt 73 3 kg (161 lb 8 oz)   BMI 24 56 kg/m²      Physical Exam      Telephone on 09/17/2018   Component Date Value Ref Range Status    Color, UA 09/17/2018 Yellow   Final    Clarity, UA 09/17/2018 Turbid   Final    Specific Gravity, UA 09/17/2018 >=1 030  1 003 - 1 030 Final    pH, UA 09/17/2018 5 5  4 5 - 8 0 Final    Leukocytes, UA 09/17/2018 Moderate* Negative Final    Nitrite, UA 09/17/2018 Positive* Negative Final    Protein, UA 09/17/2018 100 (2+)* Negative mg/dl Final    Glucose, UA 09/17/2018 Negative  Negative mg/dl Final    Ketones, UA 09/17/2018 Negative  Negative mg/dl Final    Urobilinogen, UA 09/17/2018 0 2  0 2, 1 0 E U /dl E U /dl Final    Bilirubin, UA 09/17/2018 Negative  Negative Final    Blood, UA 09/17/2018 Large* Negative Final    RBC, UA 09/17/2018 Innumerable* None Seen, 0-5 /hpf Final    WBC, UA 09/17/2018 30-50* None Seen, 0-5, 5-55, 5-65 /hpf Final    Epithelial Cells 09/17/2018 Occasional  None Seen, Occasional /hpf Final    Bacteria, UA 09/17/2018 Innumerable* None Seen, Occasional /hpf Final    Urine Culture 09/17/2018 >100,000 cfu/ml Escherichia coli*  Final         Debbie Zhang MD

## 2018-09-24 ENCOUNTER — OFFICE VISIT (OUTPATIENT)
Dept: FAMILY MEDICINE CLINIC | Facility: HOSPITAL | Age: 83
End: 2018-09-24
Payer: MEDICARE

## 2018-09-24 ENCOUNTER — HOSPITAL ENCOUNTER (OUTPATIENT)
Dept: RADIOLOGY | Facility: HOSPITAL | Age: 83
Discharge: HOME | End: 2018-09-24
Attending: INTERNAL MEDICINE
Payer: MEDICARE

## 2018-09-24 VITALS
OXYGEN SATURATION: 91 % | WEIGHT: 162.5 LBS | TEMPERATURE: 96.1 F | DIASTOLIC BLOOD PRESSURE: 70 MMHG | HEART RATE: 67 BPM | BODY MASS INDEX: 24.63 KG/M2 | SYSTOLIC BLOOD PRESSURE: 120 MMHG | HEIGHT: 68 IN

## 2018-09-24 DIAGNOSIS — M54.42 ACUTE LEFT-SIDED LOW BACK PAIN WITH LEFT-SIDED SCIATICA: ICD-10-CM

## 2018-09-24 DIAGNOSIS — M54.42 ACUTE LEFT-SIDED LOW BACK PAIN WITH LEFT-SIDED SCIATICA: Primary | ICD-10-CM

## 2018-09-24 DIAGNOSIS — Z85.46 H/O PROSTATE CANCER: ICD-10-CM

## 2018-09-24 PROBLEM — G43.109 RETINAL MIGRAINE: Status: RESOLVED | Noted: 2017-02-05 | Resolved: 2018-09-24

## 2018-09-24 PROBLEM — M54.50 ACUTE BILATERAL LOW BACK PAIN WITHOUT SCIATICA: Status: ACTIVE | Noted: 2018-09-24

## 2018-09-24 PROBLEM — G56.03 BILATERAL CARPAL TUNNEL SYNDROME: Status: RESOLVED | Noted: 2018-06-01 | Resolved: 2018-09-24

## 2018-09-24 PROBLEM — M54.40 ACUTE LEFT-SIDED LOW BACK PAIN WITH SCIATICA: Status: ACTIVE | Noted: 2018-09-24

## 2018-09-24 PROCEDURE — 99213 OFFICE O/P EST LOW 20 MIN: CPT | Performed by: INTERNAL MEDICINE

## 2018-09-24 PROCEDURE — 72110 X-RAY EXAM L-2 SPINE 4/>VWS: CPT

## 2018-09-24 PROCEDURE — 73502 X-RAY EXAM HIP UNI 2-3 VIEWS: CPT

## 2018-09-24 RX ORDER — METHYLPREDNISOLONE 4 MG/1
TABLET ORAL
Qty: 21 TABLET | Refills: 0 | Status: SHIPPED | OUTPATIENT
Start: 2018-09-24 | End: 2018-01-01 | Stop reason: CLARIF

## 2018-09-24 RX ORDER — GABAPENTIN 100 MG/1
100 CAPSULE ORAL DAILY
Qty: 21 CAPSULE | Refills: 0 | Status: SHIPPED | OUTPATIENT
Start: 2018-09-24 | End: 2018-01-01 | Stop reason: SDUPTHER

## 2018-09-24 NOTE — PROGRESS NOTES
Assessment/Plan:    Acute bilateral low back pain without sciatica  Severe pain low back, can hardly stand to walk    Hx of prostate Ca, s/p chemo and XRT  Xray of lumbar spine Jan 2017 showed multilevel DDD    H/O prostate cancer  Was treated with RT    Patient Active Problem List   Diagnosis    Essential hypertension    Pure hypercholesterolemia    Depression    Chronic pain    ASCVD (arteriosclerotic cardiovascular disease)    Retinal migraine    Thoracic aortic aneurysm (HCC)    Bilateral carpal tunnel syndrome    Coronary artery disease involving native coronary artery of native heart without angina pectoris    H/O prostate cancer    Acute bilateral low back pain without sciatica     No orders of the defined types were placed in this encounter  Diagnoses and all orders for this visit:    Acute bilateral low back pain without sciatica    H/O prostate cancer          Subjective:      Patient ID: Antonio Farias is a 80 y o  male  Acute visit for severe back pain and ambulatory difficulty    Had hip replacement over 1 yr ago  Relates that for about 5 years he has been getting JOSE JUAN injections throught pain management  Did have one injections 1 month ago with no real relief  Then last week on Thursday begain to have pain on left low back down with pain down the leg to posterior calf and can not walk well  Pain worsens with walking, is not as bad when sitting  Started back on his vicodin ffrom one year ago  Diagnosed with prostate CA in about 2005  Had 40 RT treatments  Does see urology for this  The following portions of the patient's history were reviewed and updated as appropriate: allergies, current medications, past family history, past medical history, past social history, past surgical history and problem list     Review of Systems   Constitutional: Negative for fatigue and fever  HENT: Negative for hearing loss  Eyes: Negative for visual disturbance  Respiratory: Negative for cough, chest tightness, shortness of breath and wheezing  Cardiovascular: Negative for chest pain, palpitations and leg swelling  Gastrointestinal: Negative for abdominal pain, diarrhea and nausea  Genitourinary: Negative for dysuria and hematuria  Musculoskeletal: Positive for arthralgias, back pain, gait problem and myalgias  Neurological: Negative for dizziness, numbness and headaches  Psychiatric/Behavioral: Negative for confusion and dysphoric mood  All other systems reviewed and are negative  Objective:      Current Outpatient Prescriptions:     aspirin (ECOTRIN LOW STRENGTH) 81 mg EC tablet, Take 81 mg by mouth daily, Disp: , Rfl:     atorvastatin (LIPITOR) 40 mg tablet, Take 1 tablet by mouth daily, Disp: 30 tablet, Rfl: 3    ciprofloxacin (CIPRO) 500 mg tablet, Take 1 tablet (500 mg total) by mouth every 12 (twelve) hours for 7 days, Disp: 14 tablet, Rfl: 0    clopidogrel (PLAVIX) 75 mg tablet, TAKE 1 TABLET DAILY, Disp: 30 tablet, Rfl: 8    lisinopril (ZESTRIL) 20 mg tablet, TAKE 1 TABLET DAILY  , Disp: 30 tablet, Rfl: 5    metoprolol tartrate (LOPRESSOR) 50 mg tablet, Take 50 mg by mouth 2 (two) times a day, Disp: , Rfl:     montelukast (SINGULAIR) 10 mg tablet, TAKE 1 TABLET DAILY  , Disp: 30 tablet, Rfl: 6    nitroglycerin (NITROSTAT) 0 4 mg SL tablet, PLACE 1 TABLET (0 4 MG TOTAL) UNDER THE TONGUE EVERY 5 (FIVE) MINUTES AS NEEDED FOR CHEST PAIN, Disp: 100 tablet, Rfl: 0    phenazopyridine (PYRIDIUM) 95 MG tablet, Take 1 tablet (95 mg total) by mouth 3 (three) times a day as needed for bladder spasms, Disp: 10 tablet, Rfl: 0    tamsulosin (FLOMAX) 0 4 mg, Take 0 4 mg by mouth daily with dinner, Disp: , Rfl:      Physical Exam   Constitutional: He is oriented to person, place, and time  He appears well-developed and well-nourished  Eyes: Pupils are equal, round, and reactive to light  Neck: Normal range of motion  Neck supple   No thyromegaly present  Cardiovascular: Normal rate, regular rhythm, normal heart sounds and intact distal pulses  No murmur heard  Pulmonary/Chest: Effort normal and breath sounds normal  He has no wheezes  He has no rales  Abdominal: Soft  Bowel sounds are normal  There is no tenderness  Musculoskeletal: He exhibits tenderness (low back, decreased flex/exten)  He exhibits no edema or deformity  Lymphadenopathy:     He has no cervical adenopathy  Neurological: He is alert and oriented to person, place, and time  He has normal reflexes  Skin: Skin is warm and dry  Psychiatric: He has a normal mood and affect  Nursing note and vitals reviewed

## 2018-09-24 NOTE — PATIENT INSTRUCTIONS
Acute lumbar pain  Active rest    Do activities as you are able without severe pain  Apply heat to local area few times a day  Take any medications ordered at visit  If pain changes or worsens, contact office  Physical therapy might help as back pain subsides    xrays today of back and hip  Trial of medrol for 6 days and once daily gabapentin for pain

## 2018-09-24 NOTE — ASSESSMENT & PLAN NOTE
Severe pain low back, can hardly stand to walk    Hx of prostate Ca, s/p  XRT    Xray of lumbar spine Jan 2017 showed multilevel DDD

## 2018-09-25 ENCOUNTER — TELEPHONE (OUTPATIENT)
Dept: FAMILY MEDICINE CLINIC | Facility: HOSPITAL | Age: 83
End: 2018-09-25

## 2018-09-25 DIAGNOSIS — M51.36 DDD (DEGENERATIVE DISC DISEASE), LUMBAR: Primary | ICD-10-CM

## 2018-09-25 NOTE — PROGRESS NOTES
Call pt  Hip xray is normal   Low back xray shows severe multi level degenerative discs  This has progressed when compared to the film last year  Refer to ortho for further treatment

## 2018-10-04 ENCOUNTER — TELEPHONE (OUTPATIENT)
Dept: FAMILY MEDICINE CLINIC | Facility: HOSPITAL | Age: 83
End: 2018-10-04

## 2018-10-04 DIAGNOSIS — M54.50 LOW BACK PAIN AT MULTIPLE SITES: Primary | ICD-10-CM

## 2018-10-08 ENCOUNTER — EVALUATION (OUTPATIENT)
Dept: PHYSICAL THERAPY | Facility: CLINIC | Age: 83
End: 2018-10-08
Payer: MEDICARE

## 2018-10-08 DIAGNOSIS — M54.32 LEFT SIDED SCIATICA: Primary | ICD-10-CM

## 2018-10-08 PROCEDURE — G8990 OTHER PT/OT CURRENT STATUS: HCPCS | Performed by: PHYSICAL THERAPIST

## 2018-10-08 PROCEDURE — 97161 PT EVAL LOW COMPLEX 20 MIN: CPT | Performed by: PHYSICAL THERAPIST

## 2018-10-08 PROCEDURE — 97014 ELECTRIC STIMULATION THERAPY: CPT | Performed by: PHYSICAL THERAPIST

## 2018-10-08 PROCEDURE — G8991 OTHER PT/OT GOAL STATUS: HCPCS | Performed by: PHYSICAL THERAPIST
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
## 2018-10-10 ENCOUNTER — OFFICE VISIT (OUTPATIENT)
Dept: PHYSICAL THERAPY | Facility: CLINIC | Age: 83
End: 2018-10-10
Payer: MEDICARE

## 2018-10-10 DIAGNOSIS — M54.42 ACUTE LEFT-SIDED LOW BACK PAIN WITH LEFT-SIDED SCIATICA: ICD-10-CM

## 2018-10-10 DIAGNOSIS — M54.32 LEFT SIDED SCIATICA: Primary | ICD-10-CM

## 2018-10-10 PROCEDURE — 97110 THERAPEUTIC EXERCISES: CPT

## 2018-10-10 PROCEDURE — 97014 ELECTRIC STIMULATION THERAPY: CPT

## 2018-10-10 NOTE — PROGRESS NOTES
Daily Note     Today's date: 10/10/2018  Patient name: Joya Norton  : 1935  MRN: [id number]  Referring provider: Kati Vegas MD  Dx:   Encounter Diagnosis     ICD-10-CM    1  Left sided sciatica M54 32                   Subjective: Patient reports he continues to have significant pain  Objective: See treatment diary below      Assessment: Tolerated treatment well  Patient exhibited good technique with therapeutic exercises and would benefit from continued PT  Patient reports feeling no worse with stretching and TE  Relief noted with TENS and HP  Plan: Continue per plan of care       Precautions: HTN           Daily Treatment Diary         Manuals 10/8  10/10                   Manual L piriformis stretch    4x20"                                                                                           Exercise Diary                        Bike NV  lv 1 6 min                   PPT with adductor squeeze NV  5" x20                   PPT with tband charli abd NV  5" x20                   PPT LE March NV  20x ea                                           Strap L HS stretch NV  20"x5                   LTR NV  10"x10                   L clamshells NV  5"x20                                                                                                                                                                                                                                                                                                                                                                   Modalities                       MH/ESTIM area L SI joint in R SL (sensory level TENS) 10 min  10 min

## 2018-10-23 NOTE — TELEPHONE ENCOUNTER
Permission for pt to hold Plavix 7 days, and ASA 5 days, prior to epidural injection scheduled for 11/12/18 by [facility]

## 2018-12-17 NOTE — PROGRESS NOTES
Addendum: 12/17/18: Pt  Had called in to say that he was in too much pain and was returning to MD after visit on 10/10/18  No return call  Now discharged from my care

## 2018-12-23 NOTE — TELEPHONE ENCOUNTER
Josh Clayton 1935  CONFIDENTIALTY NOTICE: This fax transmission is intended only for the addressee  It contains information that is legally privileged,  confidential or otherwise protected from use or disclosure  If you are not the intended recipient, you are strictly prohibited from reviewing,  disclosing, copying using or disseminating any of this information or taking any action in reliance on or regarding this information  If you have  received this fax in error, please notify us immediately by telephone so that we can arrange for its return to us  Page: 1  3  Call Id: [id number]  Health Call  Standard Call Report  Health Call  Patient Name: Josh Clayton  Gender: Male  : 1935  Age: 80 Y 25 D  Return Phone  Number: [phone] (Home)  Address: [address] [place] [address]/[facility]/Zip: [address] [place] [address]  Practice Name: [facility]  Practice Charged:  Physician:  [address] [place] [address] Name:  Relationship To  Patient: Self  Return Phone Number: [phone] (Home)  Presenting Problem: "My nose is very congested "  Service Type: Triage  Charged Service 1: Sheri Martinez U  38  Name and  Number:  Nurse Assessment  Nurse: Aliza Stoddard RN, Coretta Cheadle Date/Time: 2018 12:08:07 PM  Type of assessment required:  ---General (Adult or Child)  Duration of Current S/S  ---Since yesterday morning  Location/Radiation  ---Nose  Temperature (F) and route:  ---Denies fever  Symptom Specific Meds (Dose/Time):  ---None  Other S/S  ---Nose is very runny and stuffy  Was having a sore throat but today sore throat  symptoms has improved  No swelling or pain over sinuses  Pain Scale on scale of 1-10, 10 being the worst:  ---Denies pain  Symptom progression:  ---same  Intake and Output  ---Drinking normally / WNL  Last Exam/Treatment:  Josh Clayton 1935  CONFIDENTIALTY NOTICE: This fax transmission is intended only for the addressee   It contains information that is legally privileged,  confidential or otherwise protected from use or disclosure  If you are not the intended recipient, you are strictly prohibited from reviewing,  disclosing, copying using or disseminating any of this information or taking any action in reliance on or regarding this information  If you have  received this fax in error, please notify us immediately by telephone so that we can arrange for its return to us  Page: 2 of 3  Call Id: [id number]  Nurse Assessment  ---Seen in October with Physical therapy for left sides sciatica  Protocols  Protocol Title Nurse Date/Time  Sinus Pain or Congestion JONES Ernst, [facility] 12/23/2018 12:11:13 PM  Question Caller Affirmed  Disp  Time Disposition Final User  12/23/2018 12:15:41 [id number] S  Pernell Mae RN, [facility]  12/23/2018 12:16:01 PM RN Triaged Yes Niall Ferrara RN, [facility] Advice Given Per Protocol  HOME CARE: You should be able to treat this at home  REASSURANCE: * Sinus congestion is a normal part of a cold  * Usually  home treatment with nasal washes can prevent an actual bacterial sinus infection  * Antibiotics are not helpful for the sinus congestion  that occurs with colds  FOR A STUFFY NOSE - USE NASAL WASHES: * Introduction: Saline (salt water) nasal irrigation (nasal wash)  is an effective and simple home remedy for treating stuffy nose and sinus congestion  The nose can be irrigated by pouring, spraying,  or squirting salt water into the nose and then letting it run back out  * How it Helps: The salt water rinses out excess mucus, washes  out any irritants (dust, allergens) that might be present, and moistens the nasal cavity  * Methods: There are several ways to perform  nasal irrigation  You can use a saline nasal spray bottle (available over-the-counter), a rubber ear syringe, a medical syringe without  the needle, or a NETI POT  STEP-BY-STEP INSTRUCTIONS: * STEP 2: Gently squirt or spray warm salt water into one of your  nostrils  * STEP 1: Lean over a sink   * STEP 3: Some of the water may run into the back of your throat  Spit this out  If you swallow  the salt water it will not hurt you  * STEP 4: Blow your nose to clean out the water and mucus  * STEP 5: Repeat steps 1-4 for the other  nostril  You can do this a couple times a day if it seems to help you  HOW TO MAKE SALINE (SALT WATER) NASAL WASH:  NASAL DECONGESTANTS FOR A VERY STUFFY NOSE: * If you have a very stuffy nose, nasal decongestant medicines can  shrink the swollen nasal mucosa and allow for easier breathing  If you have a very runny nose, these medicines can reduce the amount  of drainage  They may be taken as pills by mouth or as a nasal spray  * Most people do NOT need to use these medicines  If your nose  feels blocked, you should try using nasal washes first  * PSEUDOEPHEDRINE (Sudafed) is available OTC in pill form  Typical adult  dosage is two 30 mg tablets every 6 hours  * PHENYLEPHRINE (Sudafed PE) is available OTC in pill form  Typical adult dosage is  one 10 mg tablet every 4 hours  CAUTION - NASAL DECONGESTANTS: * Do not take these medications if you have high blood  pressure, heart disease, prostate enlargement, or an overactive thyroid  * Do not take these medications if you are pregnant  * Do not  take these medications if you have used a MAO inhibitor such as isocarboxazid (Marplan), phenelzine (Nardil), rasagiline (Azilect),  selegiline (Eldepryl, Emsam), or tranylcypromine (Parnate) in the past 2 weeks  Life-threatening side effects can occur  * Do not use  these medications for more than 3 days (Reason: rebound nasal congestion)  PAIN MEDICINES: IBUPROFEN (E G , MOTRIN,  ADVIL): * Take 400 mg (two 200 mg pills) by mouth every 6 hours as needed  * Another choice is to take 600 mg (three 200 mg pills)  by mouth every 8 hours as needed  * The most you should take each day is 1,200 mg (six 200 mg pills a day), unless your doctor has  told you to take more   CAUTION - NSAIDS (E G , IBUPROFEN, NAPROXEN): * Do not take nonsteroidal anti-inflammatory drugs  (NSAIDs) if you have stomach problems, kidney disease, heart failure, or other contraindications to using this type of medication  *  Do not take NSAID medications for over 7 days without consulting your PCP  * Do not take NSAID medications if you are pregnant  * GASTROINTESTINAL RISK: There is an increased risk of stomach ulcers, GI bleeding, perforation  * You may take this medicine  with or without food  Taking it with food or milk may lessen the chance the drug will upset your stomach  * CARDIOVASCULAR  RISK: There may be an increased risk of heart attack and stroke  EXPECTED COURSE: * Sinus congestion from viral upper respiratory  infections (colds) usually lasts 5-10 days  * Occasionally a cold can worsen and turn into bacterial sinusitis  Clues to this are sinus  symptoms lasting longer than 10 days, fever lasting longer than 3 days and worsening pain  Bacterial sinusitis may need antibiotic  treatment  CALL BACK IF: * Severe pain persists over 2 hours after pain medicine * Sinus pain persists over 1 day after using nasal  Reinoso Aidee 1935  CONFIDENTIALTY NOTICE: This fax transmission is intended only for the addressee  It contains information that is legally privileged,  confidential or otherwise protected from use or disclosure  If you are not the intended recipient, you are strictly prohibited from reviewing,  disclosing, copying using or disseminating any of this information or taking any action in reliance on or regarding this information  If you have  received this fax in error, please notify us immediately by telephone so that we can arrange for its return to us  Page: 3 of 3  Call Id: [phone] [facility] Advice Given Per Protocol  washes * Sinus congestion (fullness) persists over 10 days * Fever lasts over 3 days * You become worse  CARE ADVICE given per  Sinus Pain or Congestion (Adult) guideline    Caller Understands: Yes  Caller Disagree/Comply: Comply  PreDisposition: Unsure

## 2018-12-24 NOTE — PROGRESS NOTES
Assessment/Plan:         Diagnoses and all orders for this visit:    Upper respiratory tract infection, unspecified type  -     azithromycin (ZITHROMAX) 250 mg tablet; Take 2 tablets today then 1 tablet daily x 4 days        Subjective:      Patient ID: Suraj Mock is a 80 y o  male  80year old white male presents with c/o runny nose and congestion since Friday  Took liquid Tylenol for cold/congestion, which helped a little  Severe sore throat,  And cough  Review of Systems   Constitutional: Positive for fatigue and fever  Negative for chills and diaphoresis  HENT: Positive for congestion, rhinorrhea and sore throat  Negative for ear pain  Respiratory: Positive for cough  Negative for shortness of breath  Objective:      /66   Pulse 68   Temp 99 9 °F (37 7 °C) (Tympanic)   Resp 16   Ht 5' 8" (1 727 m)   Wt 74 8 kg (165 lb)   BMI 25 09 kg/m²          Physical Exam   Constitutional: He is oriented to person, place, and time  He appears well-developed and well-nourished  No distress  HENT:   Head: Normocephalic and atraumatic  Mouth/Throat: Oropharynx is clear and moist  No oropharyngeal exudate  Turbinates inflamed  Unable to see TM due to cerumen  Eyes: Conjunctivae and EOM are normal  Right eye exhibits no discharge  Left eye exhibits no discharge  No scleral icterus  Pulmonary/Chest: Effort normal and breath sounds normal  No respiratory distress  He has no wheezes  He has no rales  Neurological: He is alert and oriented to person, place, and time  Skin: He is not diaphoretic  Nursing note and vitals reviewed

## 2018-12-27 PROBLEM — R31.9 HEMATURIA: Status: ACTIVE | Noted: 2018-01-01

## 2018-12-27 NOTE — PLAN OF CARE
GENITOURINARY - ADULT     Maintains or returns to baseline urinary function Progressing     Absence of urinary retention Progressing     Urinary catheter remains patent Progressing        HEMATOLOGIC - ADULT     Maintains hematologic stability Progressing        INFECTION - ADULT     Absence or prevention of progression during hospitalization Progressing     Absence of fever/infection during neutropenic period Progressing        METABOLIC, FLUID AND ELECTROLYTES - ADULT     Electrolytes maintained within normal limits Progressing     Fluid balance maintained Progressing     Glucose maintained within target range Progressing        PAIN - ADULT     Verbalizes/displays adequate comfort level or baseline comfort level Progressing        SAFETY ADULT     Patient will remain free of falls Progressing     Maintain or return to baseline ADL function Progressing     Maintain or return mobility status to optimal level Progressing
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
## 2018-12-27 NOTE — ED NOTES
Pt ambulates to bed 1 without issue  Pt states that at 5am this am when he urinated it was full of blood, and clots  Pt states he is unable to urinate at this time, urinal given incase he can give a sample  Pt changed into gown   Call bell placed within reach      nAdrew Astorga RN  12/27/18 2046
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
## 2018-12-27 NOTE — ED NOTES
Patient A&Ox4  Able to answer questions appropriately  HRR, BS clear  Aware of need for urine sample  Urinal given        Antonio Johnson RN  12/27/18 3586

## 2018-12-27 NOTE — ED NOTES
Attempted to cath patient using a three way cath twice  Unsuccessful  Dr Monica Roldan aware        Christian Schumacher, RN  12/27/18 0099
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
## 2018-12-28 NOTE — PROGRESS NOTES
Patient complaining of increased pain at the hagen insertion site  EVELIA Portillo with [facility] notified  Lidocaine ordered for patient and administered  Will continue to monitor

## 2018-12-28 NOTE — PROGRESS NOTES
RN went to administer dilaudid and noted that the hagen catheter which was just emptied was no longer draining  Irrigated with 60ml and was able to pull back multiple clots  When attempting to irrigate again, RN met resistance and unable to pull back instilled sterile water  EVELIA Arreola notified  Urology paged

## 2018-12-28 NOTE — SOCIAL WORK
Patient will be returning home with a hagen cath in place  He is now agreeable to VNA  Given freedom of choice  Referral via [facility] to [facility]

## 2018-12-28 NOTE — PROGRESS NOTES
Patient going through about 1 CBI bag an hour  Nursing supervisor notified to ensure enough bags are in house to get the patient through the night  Kristen Guerra

## 2018-12-28 NOTE — PROGRESS NOTES
EVELIA Oviedo at the bedside to evaluate patient  1x dose dilaudid ordered for patient  Will administer

## 2018-12-28 NOTE — SOCIAL WORK
Met with patient  Explained role of care management  Patient lives in an in law suite at [facility]  [address]  He is independent adl's and ambulation, drives, provides own meals  DME - [employer] [facility]  [facility]  Pharmacy of choice is [facility]  He plans on returning home at discharge and is unsure if he will need VNA  Will follow and provide services as needed

## 2018-12-28 NOTE — PROGRESS NOTES
CBI currently running wide open  Output a clear pink lemonade color without clots  Anytime RN attempts to slow rate down, patient develops clots and has increased bladder spasms  Will continue to run CBI wide open

## 2018-12-28 NOTE — PLAN OF CARE
DISCHARGE PLANNING - CARE MANAGEMENT     Discharge to post-acute care or home with appropriate resources Progressing        GENITOURINARY - ADULT     Maintains or returns to baseline urinary function Progressing     Absence of urinary retention Progressing     Urinary catheter remains patent Progressing        HEMATOLOGIC - ADULT     Maintains hematologic stability Progressing        INFECTION - ADULT     Absence or prevention of progression during hospitalization Progressing     Absence of fever/infection during neutropenic period Progressing        METABOLIC, FLUID AND ELECTROLYTES - ADULT     Electrolytes maintained within normal limits Progressing     Fluid balance maintained Progressing     Glucose maintained within target range Progressing        PAIN - ADULT     Verbalizes/displays adequate comfort level or baseline comfort level Progressing        Prexisting or High Potential for Compromised Skin Integrity     Skin integrity is maintained or improved Progressing        SAFETY ADULT     Patient will remain free of falls Progressing     Maintain or return to baseline ADL function Progressing     Maintain or return mobility status to optimal level Progressing

## 2018-12-28 NOTE — PROGRESS NOTES
Patients justin Cm at bedside and updated on patients events over night  RN has also been in touch with patients daughter, Lillie Ramon who has been updated as well

## 2018-12-28 NOTE — PROGRESS NOTES
RN received call from Dr Sharif Duarte  Since we are unable to remove clots, hagen will need to be exchanged  Supplies at bedside  One final attempt to irrigate prior to switching hagen and successful  Multiple large clots removed from hagen  Hagen is now draining clear- pink tinged  CBI restarted  Patient resting comfortably

## 2018-12-28 NOTE — PROGRESS NOTES
Cortez irrigated with 120ml sterile water  120ml pink tinged fluid returned  No clots noted at this time  Will continue to monitor

## 2018-12-28 NOTE — UTILIZATION REVIEW
Initial Clinical Review    Admission: Date/Time/Statement: 12/27/18 @ 1415     Orders Placed This Encounter   Procedures    Inpatient Admission (expected length of stay for this patient is greater than two midnights)     Standing Status:   Standing     Number of Occurrences:   1     Order Specific Question:   Admitting Physician     Answer:   Jesús Ramirez [[id number]]     Order Specific Question:   Level of Care     Answer:   Med Surg [[id number]]     Order Specific Question:   Estimated length of stay     Answer:   More than 2 Midnights     Order Specific Question:   Certification     Answer:   I certify that inpatient services are medically necessary for this patient for a duration of greater than two midnights  See H&P and MD Progress Notes for additional information about the patient's course of treatment  ED Arrival Information     Expected Arrival Acuity Means of Arrival Escorted By Service Admission Type    - 12/27/2018 09:31 Urgent Walk-In Self Urology Urgent    Arrival Complaint    hematuria          Chief Complaint   Patient presents with    Blood in Urine     pt presents to ED c/o of blood in his urine since 5 am this am  States it was a lot of blood and even some clots  History of Illness:       80year old male presents to ed for evaluation of sudden onset of gross hematuria  this morning  He was straining to urinate when he felt a pop  Urine then flowed normally but it was grossly bloody  In the ed he had difficulty passing urine  He has been having increased nocturia  And frequency and small volume    He has a history of prostate cancer and radiation treatment 16 years ago and PSA has been normal   On plavix      ED Triage Vitals [12/27/18 0944]   97 7 °F (36 5 °C) 78 18 167/81 98 %      No Pain       12/28/18 66 kg (145 lb 8 1 oz)       Vital Signs (abnormal):   12/27/18 1523   199/87   12/27/18 1430   179/110   12/27/18 1400   182/88   12/27/18 1230   176/76   12/27/18 1100   181/91 12/27/18 1030   196/86       Abnormal Labs/Diagnostic Test Results:     Physical exam  :  Positive abdominal distension, lower abdominal tenderness, guarding, penile edema  And incontinent drips of bloody urine       RBC, UA Innumerable (A)   Bacteria, UA Field obscured, unable to enumerate (A)     Nitrite, UA Positive (A)   Protein,  (3+) (A)   Glucose,  (1/10%) (A)   Bilirubin, UA Small (A)      Blood, UA Large (A)     Urine culture  No growth     Hb 13 6  11 6    Hct  40 7  34 5       Ct  Abdomen and pelvis    Hyperdense soft tissue in the dependent posterior bladder measuring approximately 4 5 x 2 5 x 2 cm  This is likely layering hemorrhage  Consider follow-up with bladder sonogram to exclude underlying mass if it would alter patient management        ED Treatment:     3 way hagen placed by surgical team     Medication Administration from 12/27/2018 0930 to 12/27/2018 1501       Date/Time Order Dose Route     12/27/2018 1205 morphine (PF) 4 mg/mL injection 4 mg 4 mg Intravenous     12/27/2018 1307 fentanyl citrate (PF) 100 MCG/2ML 50 mcg 50 mcg Intravenous     12/27/2018 1449 cefTRIAXone (ROCEPHIN) IVPB (premix) 1,000 mg 1,000 mg Intravenous       Past Medical/Surgical History:     Diagnosis    Essential hypertension    Pure hypercholesterolemia    Depression    Chronic pain    ASCVD (arteriosclerotic cardiovascular disease)    Thoracic aortic aneurysm ([facility] )    Coronary artery disease involving native coronary artery of native heart without angina pectoris    H/O prostate cancer    Acute left-sided low back pain with sciatica           Admitting Diagnosis: Hematuria [R31 9]    Age/Sex: 80 y o  male    Assessment/Plan:  Diagnosis management comments: Gross hematuria differential includes intrarenal or bladder tumor versus infection or stone   · Gross Hematuria  Patient has history of prostate cancer and completed external beam radiation therapy 15 years ago    Continue on CBI as per Urology  Hold aspirin and Plavix  Patient is going to be NPO after midnight for possible cystoscopy evaluation in a   IV fluids  Monitor H&H and transfuse as needed  On Flomax  · CAD, hypertension, hyperlipidemia and depression  Continue on Lopressor, Zestril, Lipitor  Aspirin and Plavix on hold secondary to hematuria        Admission Orders:    3 way hagen   For continuous bladder irrigation   Npo   Trend hb and hct   PSA    atorvastatin 40 mg Oral Daily   belladonna-opium 1 suppository Rectal Q6H PRN   dextrose 5% lactated ringer's 75 mL/hr Intravenous Continuous   enoxaparin 40 mg Subcutaneous Daily   hydrALAZINE 10 mg Intravenous Q6H PRN   lidocaine  Topical Daily PRN   lisinopril 20 mg Oral Daily   LORazepam 1 mg Intravenous Q4H PRN   metoprolol tartrate 50 mg Oral BID   montelukast 10 mg Oral Daily   nitroglycerin 0 4 mg Sublingual Q5 Min PRN   oxybutynin 5 mg Oral TID   phenazopyridine 100 mg Oral TID With Meals   sodium chloride 3,000 mL Irrigation Continuous   tamsulosin 0 4 mg Oral Daily With Functional Neuromodulation
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
## 2018-12-28 NOTE — PROGRESS NOTES
Spoke with daughter Augusto Douglass and updated her as to course of treatment  Will call back again to check in later

## 2018-12-28 NOTE — PROGRESS NOTES
Progress Note - General Surgery   Charlie Elias 80 y o  male MRN: [id number]  Unit/Bed#: [address] [place] [address] 201-01 Encounter: [id number]    Assessment/Plan:  Gross hematuria  Likely secondary to radiation cystitis  CBI running clear this am, will taper down to potentially stop  Hold Plavix, possibly d/c Plavix completely - needs to be discussed with cardiology  Add PSA to today's blood work  Add Ativan and pyridium for anxiety/bladder spasm/penile pain  I had a long discussion with the patient regarding his pain overnight, and my hesitancy to continue Dilaudid for pain  Will monitor him this am without additional pain medications  Potential D/C to home today with hagen catheter in place    Subjective/Objective   Chief Complaint: Tip of penis pain    Subjective: Reports he had excruciating pain over night only relieved with Dilaudid injection  He reports it was unbearable and he can not stand to be in that pain again  He denies the pain this am, but is concerned it is starting up again  Nursing reports his catheter clotted off once last evening and was eventually able to be cleared  This am CBI has been running without incident  Objective:     Blood pressure 161/71, pulse 73, temperature 99 9 °F (37 7 °C), temperature source Oral, resp  rate 18, height 5' 7" (1 702 m), weight 66 kg (145 lb 8 1 oz), SpO2 98 %  ,Body mass index is 22 79 kg/m²        Intake/Output Summary (Last 24 hours) at 12/28/18 0945  Last data filed at 12/28/18 0825   Gross per 24 hour   Intake          1026 25 ml   Output            -1775 ml   Net          2801 25 ml       Invasive Devices     Peripheral Intravenous Line            Peripheral IV 12/27/18 Left Antecubital less than 1 day          Drain            Continuous Bladder Irrigation Three-way less than 1 day                Physical Exam: General appearance: alert and oriented, in no acute distress and appears anxious  Eyes: PERRL EOMI  Lungs: clear to auscultation bilaterally and without wheezes, crackles, or rhonchi  Heart: regular rate and rhythm, S1, S2 normal, no murmur, click, rub or gallop  Abdomen: soft, non-tender; bowel sounds normal; no masses,  no organomegaly  Male genitalia: 24Fr three way hagen catheter in place with CBI running clear without clots  Penis with minimial edema at tip  Hagen flushed with 120cc easily flushed with return of light pink urine with one small clot      Lab, Imaging and other studies:  CBC:   Lab Results   Component Value Date    WBC 9 62 12/28/2018    HGB 11 6 (L) 12/28/2018    HCT 34 5 (L) 12/28/2018    MCV 93 12/28/2018     12/28/2018    MCH 31 7 12/28/2018    MCHC 34 1 12/28/2018    RDW 11 9 12/28/2018    MPV 9 9 12/28/2018   , CMP:   Lab Results   Component Value Date    SODIUM 140 12/28/2018    K 3 8 12/28/2018     12/28/2018    CO2 26 12/28/2018    BUN 18 12/28/2018    CREATININE 0 88 12/28/2018    CALCIUM 8 3 12/28/2018    AST 13 12/27/2018    ALT 24 12/27/2018    ALKPHOS 71 12/27/2018    EGFR 79 12/28/2018     VTE Pharmacologic Prophylaxis: Reason for no pharmacologic prophylaxis hematuria  VTE Mechanical Prophylaxis: sequential compression device

## 2018-12-28 NOTE — PROGRESS NOTES
Irrigated catheter multiple times to remove clots  Pt currently not complaining of pain but showing great anxiety about seeing urologist   Will irrigate catheter q2 hours at this time to prevent bladder distention

## 2018-12-28 NOTE — PROGRESS NOTES
Progress Note - Albert Carter 80 y o  male MRN: [id number]  Unit/Bed#: [address] [place] [address] 201-01 Encounter: [id number]    Assessment:  Principal Problem:    Hematuria  Active Problems:    Essential hypertension    Pure hypercholesterolemia    Depression    Coronary artery disease involving native coronary artery of native heart without angina pectoris  Resolved Problems:    * No resolved hospital problems  *      Plan:  · Gross hematuria likely secondary to radiation cystitis  On CBI  Plavix and aspirin on hold  Cystoscopy as per Urology  H&H is stable  On Flomax and Ditropan  Started on Pyridium  · CAD, hypertension, hyperlipidemia and depression  Continue on Lopressor, Zestril and Lipitor  Aspirin and Plavix on hold secondary to hematuria  Will recommend to restart once okay with Urology  · Continue rest of the management as per primary service  · Discussed with patient in detail  Subjective:   Patient is seen and examined at bedside  Overnight events were noted  Pain is better controlled now  Afebrile  No other complaints  All other ROS are negative  Objective:   Vitals: Blood pressure 161/71, pulse 73, temperature 98 9 °F (37 2 °C), temperature source Oral, resp  rate 18, height 5' 7" (1 702 m), weight 66 kg (145 lb 8 1 oz), SpO2 98 %  ,Body mass index is 22 79 kg/m²  SPO2 RA Rest      ED to Hosp-Admission (Current) from 12/27/2018 in [facility] S[facility]   SpO2  98 %   SpO2 Activity  At Rest   O2 Device  None (Room air)   O2 Flow Rate          I&O:   Intake/Output Summary (Last 24 hours) at 12/28/18 1051  Last data filed at 12/28/18 1001   Gross per 24 hour   Intake          1026 25 ml   Output            -2775 ml   Net          3801 25 ml       Physical Exam:    General- Alert, lying comfortably in bed  Not in any acute distress  HEENT- LEXI, EOM intact    Neck- Supple, No JVD  CVS- regular, S1 and S2 normal   Chest- Bilateral Air entry, No rhochi, crackles or wheezing present  Abdomen- soft, nontender, not distended, no guarding or rigidity, BS+  Extremities-  No pedal edema, No calf tenderness  CNS-   Alert, awake and orientedx3  No focal deficits present      Invasive Devices     Peripheral Intravenous Line            Peripheral IV 12/27/18 Left Antecubital less than 1 day          Drain            Continuous Bladder Irrigation Three-way less than 1 day                      Social History  reviewed  Family History   Problem Relation Age of Onset    Substance Abuse Neg Hx     Mental illness Neg Hx     reviewed    Meds:  Current Facility-Administered Medications   Medication Dose Route Frequency Provider Last Rate Last Dose    atorvastatin (LIPITOR) tablet 40 mg  40 mg Oral Daily Merari Giles MD   40 mg at 12/28/18 0924    belladonna-opium (B&O SUPPOSITORY) 16 2-30 mg per suppository 1 suppository  1 suppository Rectal Q6H PRN Merari Giles MD   1 suppository at 12/28/18 0452    dextrose 5 % in lactated Ringer's infusion  75 mL/hr Intravenous Continuous Merari Giles MD 75 mL/hr at 12/28/18 0443 75 mL/hr at 12/28/18 0443    enoxaparin (LOVENOX) subcutaneous injection 40 mg  40 mg Subcutaneous Daily Merari Giles MD        hydrALAZINE (APRESOLINE) injection 10 mg  10 mg Intravenous Q6H PRN Dmitry Poe MD        lidocaine (XYLOCAINE) 2 % topical gel   Topical Daily PRN Gary Poole PA-C        lisinopril (ZESTRIL) tablet 20 mg  20 mg Oral Daily Merari Giles MD   20 mg at 12/28/18 0924    LORazepam (ATIVAN) 2 mg/mL injection 1 mg  1 mg Intravenous Q4H PRN Vivienne Gandhi PA-C   1 mg at 12/28/18 9467    metoprolol tartrate (LOPRESSOR) tablet 50 mg  50 mg Oral BID Merari Giles MD   50 mg at 12/28/18 0924    montelukast (SINGULAIR) tablet 10 mg  10 mg Oral Daily Merari Giles MD   10 mg at 12/28/18 0925    nitroglycerin (NITROSTAT) SL tablet 0 4 mg  0 4 mg Sublingual Q5 Min PRN Merari Giles MD        oxybutynin [facility]) tablet 5 mg 5 mg Oral TID Deloras Taoist, CRNP        phenazopyridine (PYRIDIUM) tablet 100 mg  100 mg Oral TID With Meals Amber Gandhi PA-C        sodium chloride 0 9 % irrigation solution 3,000 mL  3,000 mL Irrigation Continuous Zoey Clark MD   3,000 mL at 12/27/18 1719    tamsulosin (FLOMAX) capsule 0 4 mg  0 4 mg Oral Daily With Gosia Gibson MD          Prescriptions Prior to Admission   Medication    aspirin (ECOTRIN LOW STRENGTH) 81 mg EC tablet    atorvastatin (LIPITOR) 40 mg tablet    azithromycin (ZITHROMAX) 250 mg tablet    clopidogrel (PLAVIX) 75 mg tablet    lisinopril (ZESTRIL) 20 mg tablet    metoprolol tartrate (LOPRESSOR) 50 mg tablet    montelukast (SINGULAIR) 10 mg tablet    nitroglycerin (NITROSTAT) 0 4 mg SL tablet    tamsulosin (FLOMAX) 0 4 mg       Labs:    Results from last 7 days  Lab Units 12/28/18  0758 12/28/18  0449 12/27/18 2000 12/27/18  1049   WBC Thousand/uL  --  9 62  --  6 57   HEMOGLOBIN g/dL 11 6* 12 0 12 4 13 6   HEMATOCRIT % 34 5* 35 2* 37 1 40 7   PLATELETS Thousands/uL  --  244 261 262   NEUTROS PCT %  --   --   --  65   LYMPHS PCT %  --   --   --  21   MONOS PCT %  --   --   --  8   EOS PCT %  --   --   --  5       Results from last 7 days  Lab Units 12/28/18  0449 12/27/18  1255   POTASSIUM mmol/L 3 8 3 6   CHLORIDE mmol/L 105 104   CO2 mmol/L 26 29   BUN mg/dL 18 22   CREATININE mg/dL 0 88 1 19   CALCIUM mg/dL 8 3 8 5   ALK PHOS U/L  --  71   ALT U/L  --  24   AST U/L  --  13     Lab Results   Component Value Date    TROPONINI <0 02 02/06/2017    TROPONINI <0 02 02/05/2017    TROPONINI <0 02 02/05/2017    TROPONINI <0 04 04/20/2014    TROPONINI <0 04 04/19/2014    TROPONINI <0 04 04/19/2014    CKTOTAL 28 (L) 04/20/2014    CKTOTAL 26 (L) 04/19/2014    CKTOTAL 40 04/19/2014       Results from last 7 days  Lab Units 12/27/18  1049   INR  1 00     Lab Results   Component Value Date    URINECX >100,000 cfu/ml Escherichia coli (A) 09/17/2018 Imaging:  No results found for this or any previous visit  No results found for this or any previous visit  Labs & Imaging: I have personally reviewed pertinent reports        VTE Pharmacologic Prophylaxis: Reason for no pharmacologic prophylaxis Hematuria  VTE Mechanical Prophylaxis: sequential compression device    Code Status:   Level 1 - Full Code      "This note has been constructed using a voice recognition system"      Kaylyn Mcgarry MD  12/28/2018,10:51 AM

## 2018-12-28 NOTE — PROGRESS NOTES
Had long discussion with patient and patient's daughter regarding plan for today, tomorrow and upon discharge  All questions answered  Spare leg bag provided in room for discharge  10 day prescription for Ativan sent to pharmacy and patient's daughter agrees to follow up with PCP for more long-term plan/medication for anxiety  Patient and daughter in agreement

## 2018-12-28 NOTE — PROGRESS NOTES
Patient complaining of increased pain at hagen insertion site and has an increase in output on top of the CBI output  Hagen manually irrigated with 120ml sterile water with 120ml of fruit punch colored urine to insure catheter is working properly  No clots noted at this time  Prerna CASTLE notified of significant urine output

## 2018-12-29 NOTE — NURSING NOTE
Yellow urine noted from the 3 way hagen cath  Irrigated cath with 180 mls and removed about 2 large clots  CBI draining at a slow rate  Will continue to monitor and irrigate q 4 hours to keep drainage clear

## 2018-12-29 NOTE — DISCHARGE SUMMARY
Discharge Summary - Betsy Hassan 80 y o  male MRN: [id number]    Unit/Bed#: [address] [place], [place] [id number] Encounter: [id number]    Admission Date: 12/27/2018     Admitting Diagnosis: Hematuria [R31 9]    Discharge Date:   12/29/2018  HPI: ADMITTED FOR gross hematuria, likely radiation cystitis  Procedures Performed: No orders of the defined types were placed in this encounter  Hospital Course: hagen catheter placed, urine cleared up on irrigation  Ready for DC with oxybutynin if needed for spasm  Off anti-coag now, to be restarted after sees cardiology  Discharge Diagnosis: gross hematuria, from radiation cystitis  1  Hematuria        Condition at Discharge: good     Discharge Medications: new med: oxybutynin if needed for spasm  See after visit summary for reconciled discharge medications provided to patient and family  Discharge instructions/Information to patient and family:   See after visit summary for information provided to patient and family  Provisions for Follow-Up Care:  See after visit summary for information related to follow-up care and any pertinent home health orders  Disposition: Home    Planned Readmission: No    Discharge Statement   I spent 30 minutes discharging the patient  This time was spent on the day of discharge  I had direct contact with the patient on the day of discharge  Additional documentation is required if more than 30 minutes were spent on discharge       Signature:   Chau Segovia MD  Date: 12/29/2018 Time: 2:23 PM

## 2018-12-29 NOTE — NURSING NOTE
Irrigated 3 way cath with 60 mls sterile water x 2 with a return of yellow colored drainage/ irrigant  No blood clots witnessed  Will clamp off 3 way cath at this time as instructed during change of shift report and continue to monitor for presence of clots or change in urine color

## 2018-12-29 NOTE — PROGRESS NOTES
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  On CBI  Plavix and aspirin on hold  H&H is stable  Further workup including cystoscopy as per Urology  On Flomax, Ditropan and Pyridium  · CAD, hypertension, hyperlipidemia and depression  On Lopressor, Zestril and Lipitor  Aspirin and Plavix on hold secondary to hematuria  Will recommend to restart once okay with Urology  · Continue rest of the management as per primary urology service  · Will sign off  · Discussed with patient  Subjective:   Patient is seen and examined at bedside  Afebrile  Pain is well controlled  All other ROS are negative  Objective:   Vitals: Blood pressure 150/78, pulse 87, temperature 99 1 °F (37 3 °C), temperature source Oral, resp  rate 18, height 5' 7" (1 702 m), weight 67 kg (147 lb 11 3 oz), SpO2 95 %  ,Body mass index is 23 13 kg/m²  SPO2 RA Rest      ED to Hosp-Admission (Current) from 12/27/2018 in [facility] Surg [facility]   SpO2  95 %   SpO2 Activity  At Rest   O2 Device  None (Room air)   O2 Flow Rate          I&O:   Intake/Output Summary (Last 24 hours) at 12/29/18 1131  Last data filed at 12/29/18 0853   Gross per 24 hour   Intake           743 75 ml   Output            -2550 ml   Net          3293 75 ml       Physical Exam:    General- Alert, lying comfortably in bed  Not in any acute distress  HEENT- LEXI, EOM intact  Neck- Supple, No JVD  CVS- regular, S1 and S2 normal   Chest- Bilateral Air entry, No rhochi, crackles or wheezing present    Abdomen- soft, nontender, not distended, no guarding or rigidity, BS+  Extremities-  No pedal edema, No calf tenderness  CNS-   Alert, awake and orientedx3  No focal deficits present      Invasive Devices     Peripheral Intravenous Line            Peripheral IV 12/27/18 Left Antecubital 2 days          Drain            Urethral Catheter Three way 24 Fr  2 days    Continuous Bladder Irrigation Three-way 1 day                      Social History  reviewed  Family History   Problem Relation Age of Onset    Substance Abuse Neg Hx     Mental illness Neg Hx     reviewed    Meds:  Current Facility-Administered Medications   Medication Dose Route Frequency Provider Last Rate Last Dose    atorvastatin (LIPITOR) tablet 40 mg  40 mg Oral Daily Brayan Key MD   40 mg at 12/29/18 0820    belladonna-opium (B&O SUPPOSITORY) 16 2-30 mg per suppository 1 suppository  1 suppository Rectal Q6H PRN Brayan Key MD   1 suppository at 12/28/18 1129    dextrose 5 % in lactated Ringer's infusion  75 mL/hr Intravenous Continuous Brayan Key MD 75 mL/hr at 12/29/18 0538 75 mL/hr at 12/29/18 0538    enoxaparin (LOVENOX) subcutaneous injection 40 mg  40 mg Subcutaneous Daily Brayan Key MD        hydrALAZINE (APRESOLINE) injection 10 mg  10 mg Intravenous Q6H PRN Karine Jonas MD        HYDROmorphone (DILAUDID) injection 0 2 mg  0 2 mg Intravenous Q4H PRN Vivienne Gandhi PA-C        lidocaine (XYLOCAINE) 2 % topical gel   Topical Daily PRN Jacob Abernathy PA-C        lisinopril (ZESTRIL) tablet 20 mg  20 mg Oral Daily Brayan Key MD   20 mg at 12/29/18 0820    LORazepam (ATIVAN) 2 mg/mL injection 1 mg  1 mg Intravenous Q4H PRN Vivienne Gandhi PA-C   1 mg at 12/28/18 2125    metoprolol tartrate (LOPRESSOR) tablet 50 mg  50 mg Oral BID Brayan Key MD   50 mg at 12/29/18 0820    montelukast (SINGULAIR) tablet 10 mg  10 mg Oral Daily Brayan Key MD   10 mg at 12/29/18 0820    nitroglycerin (NITROSTAT) SL tablet 0 4 mg 0 4 mg Sublingual Q5 Min PRN Margy Valdivia MD        oxybutynin [facility]) tablet 5 mg  5 mg Oral TID CHAD Estrada   5 mg at 18 0820    phenazopyridine (PYRIDIUM) tablet 100 mg  100 mg Oral TID With Meals Florentino Gandhi PA-C   100 mg at 18 0820    sodium chloride 0 9 % irrigation solution 3,000 mL  3,000 mL Irrigation Continuous Margy Valdivia MD   3,000 mL at 18 1719    tamsulosin (FLOMAX) capsule 0 4 mg  0 4 mg Oral Daily With Herminia Ordonez MD   0 4 mg at 18 1758      Prescriptions Prior to Admission   Medication    aspirin (ECOTRIN LOW STRENGTH) 81 mg EC tablet    atorvastatin (LIPITOR) 40 mg tablet    [] azithromycin (ZITHROMAX) 250 mg tablet    clopidogrel (PLAVIX) 75 mg tablet    lisinopril (ZESTRIL) 20 mg tablet    metoprolol tartrate (LOPRESSOR) 50 mg tablet    montelukast (SINGULAIR) 10 mg tablet    nitroglycerin (NITROSTAT) 0 4 mg SL tablet    tamsulosin (FLOMAX) 0 4 mg       Labs:    Results from last 7 days  Lab Units 18  04418  2244 18  0758 18  04418  2000 18  1049   WBC Thousand/uL 10 90*  --   --  9 62  --  6 57   HEMOGLOBIN g/dL 10 5* 11 9* 11 6* 12 0 12 4 13 6   HEMATOCRIT % 31 9* 36 0* 34 5* 35 2* 37 1 40 7   PLATELETS Thousands/uL 235  --   --  244 261 262   NEUTROS PCT % 73  --   --   --   --  65   LYMPHS PCT % 13*  --   --   --   --  21   MONOS PCT % 10  --   --   --   --  8   EOS PCT % 3  --   --   --   --  5       Results from last 7 days  Lab Units 18  0449 18  1255   POTASSIUM mmol/L 3 8 3 6   CHLORIDE mmol/L 105 104   CO2 mmol/L 26 29   BUN mg/dL 18 22   CREATININE mg/dL 0 88 1 19   CALCIUM mg/dL 8 3 8 5   ALK PHOS U/L  --  71   ALT U/L  --  24   AST U/L  --  13     Lab Results   Component Value Date    TROPONINI <0 02 2017    TROPONINI <0 02 2017    TROPONINI <0 02 2017    TROPONINI <0 04 2014    TROPONINI <0 04 2014    TROPONINI <0 04 04/19/2014    CKTOTAL 28 (L) 04/20/2014    CKTOTAL 26 (L) 04/19/2014    CKTOTAL 40 04/19/2014       Results from last 7 days  Lab Units 12/27/18  1049   INR  1 00     Lab Results   Component Value Date    URINECX No Growth <1000 cfu/mL 12/27/2018    URINECX >100,000 cfu/ml Escherichia coli (A) 09/17/2018         Imaging:  No results found for this or any previous visit  No results found for this or any previous visit  Labs & Imaging: I have personally reviewed pertinent reports        VTE Pharmacologic Prophylaxis: Reason for no pharmacologic prophylaxis Hematuria  VTE Mechanical Prophylaxis: sequential compression device    Code Status:   Level 1 - Full Code      "This note has been constructed using a voice recognition system"      Bud Humphrey MD  12/29/2018,11:31 AM

## 2018-12-29 NOTE — DISCHARGE INSTRUCTIONS
ALL YOUR  PREVIOUS MEDS ARE THE SAME  NEW MEDS:  Oxybutynin 5 mg every 12 hours if needed for bladder spasm  *** STOP OXYBUTYNIN 12 HOURS BEFORE CATHETER IS REMOVED ***  I ASKED VISITING NURSES TO REMOVE CATHETER ON Monday MORNING JAN 7    EXPECT SOME BLOOD IN URINE, BURNING, FREQUENT URINATION

## 2018-12-29 NOTE — NURSING NOTE
3 way hagen draining pink tinged urine with red flecks noted  On irrigation, I aspirated a few large stringy clots  Repeated procedure 4 times and withdrew the blood clots for a clear pink tinged return  Will continue to monitor and irrigate q 4 h

## 2018-12-29 NOTE — PROGRESS NOTES
Progress Note - Urology Progress  Dariela Gee 80 y o  male MRN: [id number]  Unit/Bed#: [address] [place], [place] [address] Encounter: [id number]    Assessment:  Urine clearer, ready for DC    Plan:  DC to home  Hagen out in several days  OV in [facility] urology office Dr Heraclio Storm      Blood pressure 150/78, pulse 87, temperature 99 1 °F (37 3 °C), temperature source Oral, resp  rate 18, height 5' 7" (1 702 m), weight 67 kg (147 lb 11 3 oz), SpO2 95 %  ,Body mass index is 23 13 kg/m²        Intake/Output Summary (Last 24 hours) at 12/29/18 1407  Last data filed at 12/29/18 5681   Gross per 24 hour   Intake           743 75 ml   Output            -3250 ml   Net          3993 75 ml       Invasive Devices     Peripheral Intravenous Line            Peripheral IV 12/27/18 Left Antecubital 2 days          Drain            Continuous Bladder Irrigation Three-way 2 days    Urethral Catheter Three way 24 Fr  2 days                Physical Exam: General appearance: alert and oriented, in no acute distress and anxious  Head: alert and oriented, in no acute distress and anxious  Abdomen: soft, non-tender; bowel sounds normal; no masses,  no organomegaly  Male genitalia: normal, hagen in place    Lab, Imaging and other studies:  CBC:   Lab Results   Component Value Date    WBC 10 90 (H) 12/29/2018    HGB 10 5 (L) 12/29/2018    HCT 31 9 (L) 12/29/2018    MCV 94 12/29/2018     12/29/2018    MCH 31 1 12/29/2018    MCHC 32 9 12/29/2018    RDW 11 9 12/29/2018    MPV 9 9 12/29/2018    NRBC 0 12/29/2018   , CMP: No results found for: SODIUM, K, CL, CO2, ANIONGAP, BUN, CREATININE, GLUCOSE, CALCIUM, AST, ALT, ALKPHOS, PROT, BILITOT, EGFR  }

## 2019-01-01 ENCOUNTER — TELEPHONE (OUTPATIENT)
Dept: UROLOGY | Facility: AMBULATORY SURGERY CENTER | Age: 84
End: 2019-01-01

## 2019-01-01 ENCOUNTER — TELEPHONE (OUTPATIENT)
Dept: CARDIOLOGY CLINIC | Facility: CLINIC | Age: 84
End: 2019-01-01

## 2019-01-01 ENCOUNTER — APPOINTMENT (EMERGENCY)
Dept: RADIOLOGY | Facility: HOSPITAL | Age: 84
End: 2019-01-01
Payer: MEDICARE

## 2019-01-01 ENCOUNTER — OFFICE VISIT (OUTPATIENT)
Dept: FAMILY MEDICINE CLINIC | Facility: HOSPITAL | Age: 84
End: 2019-01-01
Payer: MEDICARE

## 2019-01-01 ENCOUNTER — HOSPITAL ENCOUNTER (INPATIENT)
Facility: HOSPITAL | Age: 84
LOS: 1 days | DRG: 304 | End: 2019-10-07
Attending: EMERGENCY MEDICINE | Admitting: INTERNAL MEDICINE
Payer: MEDICARE

## 2019-01-01 ENCOUNTER — APPOINTMENT (INPATIENT)
Dept: RADIOLOGY | Facility: HOSPITAL | Age: 84
DRG: 023 | End: 2019-01-01
Payer: MEDICARE

## 2019-01-01 ENCOUNTER — APPOINTMENT (INPATIENT)
Dept: CT IMAGING | Facility: HOSPITAL | Age: 84
DRG: 065 | End: 2019-01-01
Payer: MEDICARE

## 2019-01-01 ENCOUNTER — TELEPHONE (OUTPATIENT)
Dept: UROLOGY | Facility: MEDICAL CENTER | Age: 84
End: 2019-01-01

## 2019-01-01 ENCOUNTER — OFFICE VISIT (OUTPATIENT)
Dept: UROLOGY | Facility: HOSPITAL | Age: 84
End: 2019-01-01
Payer: MEDICARE

## 2019-01-01 ENCOUNTER — APPOINTMENT (INPATIENT)
Dept: MRI IMAGING | Facility: HOSPITAL | Age: 84
DRG: 065 | End: 2019-01-01
Payer: MEDICARE

## 2019-01-01 ENCOUNTER — APPOINTMENT (INPATIENT)
Dept: RADIOLOGY | Facility: HOSPITAL | Age: 84
DRG: 065 | End: 2019-01-01
Payer: MEDICARE

## 2019-01-01 ENCOUNTER — APPOINTMENT (EMERGENCY)
Dept: CT IMAGING | Facility: HOSPITAL | Age: 84
DRG: 304 | End: 2019-01-01
Payer: MEDICARE

## 2019-01-01 ENCOUNTER — APPOINTMENT (INPATIENT)
Dept: RADIOLOGY | Facility: HOSPITAL | Age: 84
DRG: 023 | End: 2019-01-01
Attending: RADIOLOGY
Payer: MEDICARE

## 2019-01-01 ENCOUNTER — TELEPHONE (OUTPATIENT)
Dept: FAMILY MEDICINE CLINIC | Facility: HOSPITAL | Age: 84
End: 2019-01-01

## 2019-01-01 ENCOUNTER — HOSPITAL ENCOUNTER (OUTPATIENT)
Dept: NON INVASIVE DIAGNOSTICS | Facility: CLINIC | Age: 84
Discharge: HOME | End: 2019-08-27
Payer: MEDICARE

## 2019-01-01 ENCOUNTER — CLINICAL SUPPORT (OUTPATIENT)
Dept: UROLOGY | Facility: HOSPITAL | Age: 84
End: 2019-01-01
Payer: MEDICARE

## 2019-01-01 ENCOUNTER — APPOINTMENT (OUTPATIENT)
Dept: LAB | Facility: HOSPITAL | Age: 84
End: 2019-01-01
Attending: INTERNAL MEDICINE
Payer: MEDICARE

## 2019-01-01 ENCOUNTER — PROCEDURE VISIT (OUTPATIENT)
Dept: UROLOGY | Facility: AMBULATORY SURGERY CENTER | Age: 84
End: 2019-01-01
Payer: MEDICARE

## 2019-01-01 ENCOUNTER — HOSPITAL ENCOUNTER (INPATIENT)
Facility: HOSPITAL | Age: 84
LOS: 4 days | DRG: 023 | End: 2019-10-11
Attending: EMERGENCY MEDICINE | Admitting: EMERGENCY MEDICINE
Payer: MEDICARE

## 2019-01-01 ENCOUNTER — HOSPITAL ENCOUNTER (OUTPATIENT)
Dept: NON INVASIVE DIAGNOSTICS | Facility: HOSPITAL | Age: 84
Discharge: HOME | End: 2019-09-03
Attending: INTERNAL MEDICINE
Payer: MEDICARE

## 2019-01-01 ENCOUNTER — HOSPITAL ENCOUNTER (INPATIENT)
Facility: HOSPITAL | Age: 84
LOS: 3 days | Discharge: NURSING, REHAB OR LONG-TERM CARE | DRG: 065 | End: 2019-10-03
Attending: EMERGENCY MEDICINE | Admitting: INTERNAL MEDICINE
Payer: MEDICARE

## 2019-01-01 ENCOUNTER — HOSPITAL ENCOUNTER (EMERGENCY)
Facility: HOSPITAL | Age: 84
Discharge: HOME | End: 2019-08-06
Attending: EMERGENCY MEDICINE | Admitting: EMERGENCY MEDICINE
Payer: MEDICARE

## 2019-01-01 ENCOUNTER — APPOINTMENT (EMERGENCY)
Dept: RADIOLOGY | Facility: HOSPITAL | Age: 84
DRG: 304 | End: 2019-01-01
Payer: MEDICARE

## 2019-01-01 ENCOUNTER — APPOINTMENT (EMERGENCY)
Dept: RADIOLOGY | Facility: HOSPITAL | Age: 84
DRG: 065 | End: 2019-01-01
Payer: MEDICARE

## 2019-01-01 ENCOUNTER — ANESTHESIA EVENT (INPATIENT)
Dept: RADIOLOGY | Facility: HOSPITAL | Age: 84
DRG: 023 | End: 2019-01-01
Payer: MEDICARE

## 2019-01-01 ENCOUNTER — OFFICE VISIT (OUTPATIENT)
Dept: CARDIOLOGY CLINIC | Facility: CLINIC | Age: 84
End: 2019-01-01
Payer: MEDICARE

## 2019-01-01 ENCOUNTER — ANESTHESIA (INPATIENT)
Dept: RADIOLOGY | Facility: HOSPITAL | Age: 84
DRG: 023 | End: 2019-01-01
Payer: MEDICARE

## 2019-01-01 VITALS
HEIGHT: 67 IN | SYSTOLIC BLOOD PRESSURE: 135 MMHG | WEIGHT: 165 LBS | TEMPERATURE: 96.6 F | OXYGEN SATURATION: 96 % | DIASTOLIC BLOOD PRESSURE: 60 MMHG | BODY MASS INDEX: 25.9 KG/M2 | HEART RATE: 69 BPM | RESPIRATION RATE: 20 BRPM

## 2019-01-01 VITALS
DIASTOLIC BLOOD PRESSURE: 54 MMHG | BODY MASS INDEX: 24.68 KG/M2 | WEIGHT: 172.4 LBS | RESPIRATION RATE: 25 BRPM | OXYGEN SATURATION: 96 % | SYSTOLIC BLOOD PRESSURE: 145 MMHG | HEART RATE: 96 BPM | HEIGHT: 70 IN | TEMPERATURE: 100.1 F

## 2019-01-01 VITALS
SYSTOLIC BLOOD PRESSURE: 133 MMHG | WEIGHT: 160.94 LBS | OXYGEN SATURATION: 91 % | TEMPERATURE: 98.4 F | RESPIRATION RATE: 18 BRPM | BODY MASS INDEX: 25.26 KG/M2 | HEART RATE: 60 BPM | DIASTOLIC BLOOD PRESSURE: 66 MMHG | HEIGHT: 67 IN

## 2019-01-01 VITALS
DIASTOLIC BLOOD PRESSURE: 72 MMHG | HEART RATE: 63 BPM | BODY MASS INDEX: 25.27 KG/M2 | WEIGHT: 161 LBS | HEIGHT: 67 IN | SYSTOLIC BLOOD PRESSURE: 130 MMHG

## 2019-01-01 VITALS
HEIGHT: 65 IN | SYSTOLIC BLOOD PRESSURE: 130 MMHG | DIASTOLIC BLOOD PRESSURE: 60 MMHG | BODY MASS INDEX: 27.92 KG/M2 | WEIGHT: 167.6 LBS | HEART RATE: 68 BPM

## 2019-01-01 VITALS
WEIGHT: 164.6 LBS | SYSTOLIC BLOOD PRESSURE: 138 MMHG | HEART RATE: 66 BPM | BODY MASS INDEX: 25.83 KG/M2 | DIASTOLIC BLOOD PRESSURE: 70 MMHG | HEIGHT: 67 IN

## 2019-01-01 VITALS
HEART RATE: 72 BPM | HEIGHT: 65 IN | WEIGHT: 165.4 LBS | SYSTOLIC BLOOD PRESSURE: 160 MMHG | BODY MASS INDEX: 27.56 KG/M2 | DIASTOLIC BLOOD PRESSURE: 80 MMHG

## 2019-01-01 VITALS
WEIGHT: 168.8 LBS | DIASTOLIC BLOOD PRESSURE: 63 MMHG | HEIGHT: 65 IN | HEART RATE: 67 BPM | BODY MASS INDEX: 28.12 KG/M2 | SYSTOLIC BLOOD PRESSURE: 136 MMHG

## 2019-01-01 VITALS
BODY MASS INDEX: 25.83 KG/M2 | DIASTOLIC BLOOD PRESSURE: 90 MMHG | WEIGHT: 164.6 LBS | SYSTOLIC BLOOD PRESSURE: 160 MMHG | HEART RATE: 60 BPM | HEIGHT: 67 IN

## 2019-01-01 VITALS
WEIGHT: 162.4 LBS | HEIGHT: 65 IN | BODY MASS INDEX: 27.06 KG/M2 | SYSTOLIC BLOOD PRESSURE: 144 MMHG | HEART RATE: 64 BPM | DIASTOLIC BLOOD PRESSURE: 70 MMHG

## 2019-01-01 VITALS
WEIGHT: 163.8 LBS | BODY MASS INDEX: 25.71 KG/M2 | HEIGHT: 67 IN | HEART RATE: 60 BPM | SYSTOLIC BLOOD PRESSURE: 148 MMHG | DIASTOLIC BLOOD PRESSURE: 74 MMHG

## 2019-01-01 VITALS
HEART RATE: 63 BPM | TEMPERATURE: 97.5 F | HEIGHT: 67 IN | DIASTOLIC BLOOD PRESSURE: 86 MMHG | BODY MASS INDEX: 26.3 KG/M2 | SYSTOLIC BLOOD PRESSURE: 188 MMHG | OXYGEN SATURATION: 95 % | RESPIRATION RATE: 22 BRPM | WEIGHT: 167.55 LBS

## 2019-01-01 DIAGNOSIS — N32.81 OAB (OVERACTIVE BLADDER): Primary | ICD-10-CM

## 2019-01-01 DIAGNOSIS — I63.9 STROKE (HCC): Primary | ICD-10-CM

## 2019-01-01 DIAGNOSIS — J30.1 SEASONAL ALLERGIC RHINITIS DUE TO POLLEN: ICD-10-CM

## 2019-01-01 DIAGNOSIS — I25.118 CORONARY ARTERY DISEASE OF NATIVE ARTERY OF NATIVE HEART WITH STABLE ANGINA PECTORIS (HCC): Chronic | ICD-10-CM

## 2019-01-01 DIAGNOSIS — I63.9 CEREBROVASCULAR ACCIDENT (CVA), UNSPECIFIED MECHANISM (HCC): Primary | ICD-10-CM

## 2019-01-01 DIAGNOSIS — F41.1 GAD (GENERALIZED ANXIETY DISORDER): ICD-10-CM

## 2019-01-01 DIAGNOSIS — I25.10 CORONARY ARTERY DISEASE INVOLVING NATIVE CORONARY ARTERY OF NATIVE HEART WITHOUT ANGINA PECTORIS: Primary | ICD-10-CM

## 2019-01-01 DIAGNOSIS — Z87.448 HISTORY OF GROSS HEMATURIA: ICD-10-CM

## 2019-01-01 DIAGNOSIS — E78.00 PURE HYPERCHOLESTEROLEMIA: ICD-10-CM

## 2019-01-01 DIAGNOSIS — N40.1 BENIGN PROSTATIC HYPERPLASIA WITH URINARY FREQUENCY: Primary | ICD-10-CM

## 2019-01-01 DIAGNOSIS — I63.9 CEREBROVASCULAR ACCIDENT (CVA), UNSPECIFIED MECHANISM (HCC): ICD-10-CM

## 2019-01-01 DIAGNOSIS — C61 PROSTATE CANCER (HCC): Primary | ICD-10-CM

## 2019-01-01 DIAGNOSIS — R31.0 GROSS HEMATURIA: ICD-10-CM

## 2019-01-01 DIAGNOSIS — I71.2 THORACIC AORTIC ANEURYSM WITHOUT RUPTURE (HCC): ICD-10-CM

## 2019-01-01 DIAGNOSIS — R77.8 ELEVATED TROPONIN: ICD-10-CM

## 2019-01-01 DIAGNOSIS — R55 NEAR SYNCOPE: Primary | ICD-10-CM

## 2019-01-01 DIAGNOSIS — I25.10 CORONARY ARTERY DISEASE INVOLVING NATIVE CORONARY ARTERY OF NATIVE HEART WITHOUT ANGINA PECTORIS: ICD-10-CM

## 2019-01-01 DIAGNOSIS — Z85.46 H/O PROSTATE CANCER: ICD-10-CM

## 2019-01-01 DIAGNOSIS — Z01.818 PRE-OP EXAMINATION: Primary | ICD-10-CM

## 2019-01-01 DIAGNOSIS — F41.9 ANXIETY: ICD-10-CM

## 2019-01-01 DIAGNOSIS — R33.9 URINARY RETENTION: Primary | ICD-10-CM

## 2019-01-01 DIAGNOSIS — N40.1 BENIGN PROSTATIC HYPERPLASIA WITH URINARY FREQUENCY: ICD-10-CM

## 2019-01-01 DIAGNOSIS — I10 ESSENTIAL HYPERTENSION: ICD-10-CM

## 2019-01-01 DIAGNOSIS — I25.118 CORONARY ARTERY DISEASE OF NATIVE ARTERY OF NATIVE HEART WITH STABLE ANGINA PECTORIS (HCC): ICD-10-CM

## 2019-01-01 DIAGNOSIS — R55 SYNCOPE: ICD-10-CM

## 2019-01-01 DIAGNOSIS — R55 NEAR SYNCOPE: ICD-10-CM

## 2019-01-01 DIAGNOSIS — R51.9 HEADACHE: ICD-10-CM

## 2019-01-01 DIAGNOSIS — Z23 NEED FOR VACCINATION: Primary | ICD-10-CM

## 2019-01-01 DIAGNOSIS — R31.0 GROSS HEMATURIA: Primary | ICD-10-CM

## 2019-01-01 DIAGNOSIS — Z01.818 PRE-OP TESTING: ICD-10-CM

## 2019-01-01 DIAGNOSIS — R35.0 BENIGN PROSTATIC HYPERPLASIA WITH URINARY FREQUENCY: ICD-10-CM

## 2019-01-01 DIAGNOSIS — C61 PROSTATE CANCER (HCC): ICD-10-CM

## 2019-01-01 DIAGNOSIS — N32.81 OAB (OVERACTIVE BLADDER): ICD-10-CM

## 2019-01-01 DIAGNOSIS — I71.2 THORACIC AORTIC ANEURYSM WITHOUT RUPTURE (HCC): Primary | Chronic | ICD-10-CM

## 2019-01-01 DIAGNOSIS — R35.0 BENIGN PROSTATIC HYPERPLASIA WITH URINARY FREQUENCY: Primary | ICD-10-CM

## 2019-01-01 DIAGNOSIS — I25.10 ASCVD (ARTERIOSCLEROTIC CARDIOVASCULAR DISEASE): ICD-10-CM

## 2019-01-01 DIAGNOSIS — G95.20 CORD COMPRESSION (HCC): ICD-10-CM

## 2019-01-01 DIAGNOSIS — R55 VASOVAGAL ATTACK: ICD-10-CM

## 2019-01-01 DIAGNOSIS — I62.9 INTRACRANIAL HEMORRHAGE (HCC): ICD-10-CM

## 2019-01-01 LAB
ABO GROUP BLD BPU: NORMAL
ABO GROUP BLD BPU: NORMAL
ABO GROUP BLD: NORMAL
ABO GROUP BLD: NORMAL
ALBUMIN SERPL BCP-MCNC: 3.4 G/DL (ref 3.5–5)
ALP SERPL-CCNC: 76 U/L (ref 46–116)
ALT SERPL W P-5'-P-CCNC: 70 U/L (ref 12–78)
AMORPH URATE CRY URNS QL MICRO: ABNORMAL /HPF
ANION GAP SERPL CALCULATED.3IONS-SCNC: 10 MMOL/L (ref 4–13)
ANION GAP SERPL CALCULATED.3IONS-SCNC: 11 MMOL/L (ref 4–13)
ANION GAP SERPL CALCULATED.3IONS-SCNC: 11 MMOL/L (ref 4–13)
ANION GAP SERPL CALCULATED.3IONS-SCNC: 4 MMOL/L (ref 4–13)
ANION GAP SERPL CALCULATED.3IONS-SCNC: 4 MMOL/L (ref 4–13)
ANION GAP SERPL CALCULATED.3IONS-SCNC: 5 MMOL/L (ref 4–13)
ANION GAP SERPL CALCULATED.3IONS-SCNC: 5 MMOL/L (ref 4–13)
ANION GAP SERPL CALCULATED.3IONS-SCNC: 7 MMOL/L (ref 4–13)
ANION GAP SERPL CALCULATED.3IONS-SCNC: 8 MMOL/L (ref 4–13)
ANION GAP SERPL CALCULATED.3IONS-SCNC: 8 MMOL/L (ref 4–13)
ANION GAP SERPL CALCULATED.3IONS-SCNC: 9 MMOL/L (ref 4–13)
APTT PPP: 118 SECONDS (ref 23–37)
APTT PPP: 28 SECONDS (ref 23–37)
APTT PPP: 30 SECONDS (ref 23–37)
APTT PPP: 30 SECONDS (ref 23–37)
APTT PPP: 43 SECONDS (ref 23–37)
APTT PPP: 45 SECONDS (ref 23–37)
APTT PPP: 45 SECONDS (ref 23–37)
APTT PPP: 47 SECONDS (ref 23–37)
APTT PPP: 48 SECONDS (ref 23–37)
APTT PPP: 67 SECONDS (ref 23–37)
AST SERPL W P-5'-P-CCNC: 33 U/L (ref 5–45)
ATRIAL RATE: 59 BPM
ATRIAL RATE: 62 BPM
ATRIAL RATE: 64 BPM
ATRIAL RATE: 66 BPM
ATRIAL RATE: 68 BPM
ATRIAL RATE: 73 BPM
BACTERIA UR QL AUTO: ABNORMAL /HPF
BACTERIA UR QL AUTO: ABNORMAL /HPF
BASE EXCESS BLDA CALC-SCNC: -6 MMOL/L (ref -2–3)
BASOPHILS # BLD AUTO: 0.02 THOUSANDS/ΜL (ref 0–0.1)
BASOPHILS # BLD AUTO: 0.03 THOUSANDS/ΜL (ref 0–0.1)
BASOPHILS # BLD AUTO: 0.04 THOUSANDS/ΜL (ref 0–0.1)
BASOPHILS # BLD AUTO: 0.05 THOUSANDS/ΜL (ref 0–0.1)
BASOPHILS NFR BLD AUTO: 0 % (ref 0–1)
BASOPHILS NFR BLD AUTO: 1 % (ref 0–1)
BASOPHILS NFR BLD AUTO: 1 % (ref 0–1)
BILIRUB SERPL-MCNC: 0.9 MG/DL (ref 0.2–1)
BILIRUB UR QL STRIP: NEGATIVE
BILIRUB UR QL STRIP: NEGATIVE
BLD GP AB SCN SERPL QL: NEGATIVE
BLD GP AB SCN SERPL QL: NEGATIVE
BPU ID: NORMAL
BPU ID: NORMAL
BUN SERPL-MCNC: 12 MG/DL (ref 5–25)
BUN SERPL-MCNC: 13 MG/DL (ref 5–25)
BUN SERPL-MCNC: 15 MG/DL (ref 5–25)
BUN SERPL-MCNC: 15 MG/DL (ref 5–25)
BUN SERPL-MCNC: 17 MG/DL (ref 5–25)
BUN SERPL-MCNC: 18 MG/DL (ref 5–25)
BUN SERPL-MCNC: 18 MG/DL (ref 5–25)
BUN SERPL-MCNC: 19 MG/DL (ref 5–25)
BUN SERPL-MCNC: 20 MG/DL (ref 5–25)
BUN SERPL-MCNC: 24 MG/DL (ref 5–25)
BUN SERPL-MCNC: 34 MG/DL (ref 5–25)
CA-I BLD-SCNC: 1.01 MMOL/L (ref 1.12–1.32)
CALCIUM SERPL-MCNC: 7.4 MG/DL (ref 8.3–10.1)
CALCIUM SERPL-MCNC: 8.1 MG/DL (ref 8.3–10.1)
CALCIUM SERPL-MCNC: 8.2 MG/DL (ref 8.3–10.1)
CALCIUM SERPL-MCNC: 8.2 MG/DL (ref 8.3–10.1)
CALCIUM SERPL-MCNC: 8.3 MG/DL (ref 8.3–10.1)
CALCIUM SERPL-MCNC: 8.3 MG/DL (ref 8.3–10.1)
CALCIUM SERPL-MCNC: 8.4 MG/DL (ref 8.3–10.1)
CALCIUM SERPL-MCNC: 8.6 MG/DL (ref 8.3–10.1)
CALCIUM SERPL-MCNC: 8.7 MG/DL (ref 8.3–10.1)
CALCIUM SERPL-MCNC: 8.7 MG/DL (ref 8.3–10.1)
CALCIUM SERPL-MCNC: 8.8 MG/DL (ref 8.3–10.1)
CALCIUM SERPL-MCNC: 8.8 MG/DL (ref 8.3–10.1)
CALCIUM SERPL-MCNC: 8.9 MG/DL (ref 8.3–10.1)
CALCIUM SERPL-MCNC: 9.3 MG/DL (ref 8.3–10.1)
CHLORIDE SERPL-SCNC: 100 MMOL/L (ref 100–108)
CHLORIDE SERPL-SCNC: 101 MMOL/L (ref 100–108)
CHLORIDE SERPL-SCNC: 102 MMOL/L (ref 100–108)
CHLORIDE SERPL-SCNC: 103 MMOL/L (ref 100–108)
CHLORIDE SERPL-SCNC: 104 MMOL/L (ref 100–108)
CHLORIDE SERPL-SCNC: 104 MMOL/L (ref 100–108)
CHLORIDE SERPL-SCNC: 106 MMOL/L (ref 100–108)
CHLORIDE SERPL-SCNC: 106 MMOL/L (ref 100–108)
CHLORIDE SERPL-SCNC: 108 MMOL/L (ref 100–108)
CHLORIDE SERPL-SCNC: 109 MMOL/L (ref 100–108)
CHLORIDE SERPL-SCNC: 109 MMOL/L (ref 100–108)
CHLORIDE SERPL-SCNC: 110 MMOL/L (ref 100–108)
CHLORIDE SERPL-SCNC: 113 MMOL/L (ref 100–108)
CHLORIDE SERPL-SCNC: 114 MMOL/L (ref 100–108)
CHOLEST SERPL-MCNC: 101 MG/DL (ref 50–200)
CK MB SERPL-MCNC: 2.1 NG/ML (ref 0–5)
CK MB SERPL-MCNC: <1 % (ref 0–2.5)
CK SERPL-CCNC: 236 U/L (ref 39–308)
CLARITY UR: ABNORMAL
CLARITY UR: CLEAR
CO2 SERPL-SCNC: 20 MMOL/L (ref 21–32)
CO2 SERPL-SCNC: 22 MMOL/L (ref 21–32)
CO2 SERPL-SCNC: 24 MMOL/L (ref 21–32)
CO2 SERPL-SCNC: 24 MMOL/L (ref 21–32)
CO2 SERPL-SCNC: 25 MMOL/L (ref 21–32)
CO2 SERPL-SCNC: 26 MMOL/L (ref 21–32)
CO2 SERPL-SCNC: 27 MMOL/L (ref 21–32)
CO2 SERPL-SCNC: 27 MMOL/L (ref 21–32)
CO2 SERPL-SCNC: 28 MMOL/L (ref 21–32)
CO2 SERPL-SCNC: 28 MMOL/L (ref 21–32)
CO2 SERPL-SCNC: 29 MMOL/L (ref 21–32)
CO2 SERPL-SCNC: 30 MMOL/L (ref 21–32)
COLOR UR: ABNORMAL
COLOR UR: YELLOW
CREAT SERPL-MCNC: 0.74 MG/DL (ref 0.6–1.3)
CREAT SERPL-MCNC: 0.78 MG/DL (ref 0.6–1.3)
CREAT SERPL-MCNC: 0.8 MG/DL (ref 0.6–1.3)
CREAT SERPL-MCNC: 0.82 MG/DL (ref 0.6–1.3)
CREAT SERPL-MCNC: 0.83 MG/DL (ref 0.6–1.3)
CREAT SERPL-MCNC: 0.86 MG/DL (ref 0.6–1.3)
CREAT SERPL-MCNC: 0.97 MG/DL (ref 0.6–1.3)
CREAT SERPL-MCNC: 1.02 MG/DL (ref 0.6–1.3)
CREAT SERPL-MCNC: 1.07 MG/DL (ref 0.6–1.3)
CREAT SERPL-MCNC: 1.13 MG/DL (ref 0.6–1.3)
CREAT SERPL-MCNC: 1.2 MG/DL (ref 0.6–1.3)
CREAT SERPL-MCNC: 1.21 MG/DL (ref 0.6–1.3)
CREAT SERPL-MCNC: 1.3 MG/DL (ref 0.6–1.3)
CREAT SERPL-MCNC: 1.43 MG/DL (ref 0.6–1.3)
CROSSMATCH: NORMAL
CROSSMATCH: NORMAL
EOSINOPHIL # BLD AUTO: 0.02 THOUSAND/ΜL (ref 0–0.61)
EOSINOPHIL # BLD AUTO: 0.09 THOUSAND/ΜL (ref 0–0.61)
EOSINOPHIL # BLD AUTO: 0.09 THOUSAND/ΜL (ref 0–0.61)
EOSINOPHIL # BLD AUTO: 0.12 THOUSAND/ΜL (ref 0–0.61)
EOSINOPHIL # BLD AUTO: 0.19 THOUSAND/ΜL (ref 0–0.61)
EOSINOPHIL # BLD AUTO: 0.22 THOUSAND/ΜL (ref 0–0.61)
EOSINOPHIL # BLD AUTO: 0.33 THOUSAND/ΜL (ref 0–0.61)
EOSINOPHIL # BLD AUTO: 0.34 THOUSAND/ΜL (ref 0–0.61)
EOSINOPHIL # BLD AUTO: 0.39 THOUSAND/ΜL (ref 0–0.61)
EOSINOPHIL NFR BLD AUTO: 0 % (ref 0–6)
EOSINOPHIL NFR BLD AUTO: 1 % (ref 0–6)
EOSINOPHIL NFR BLD AUTO: 2 % (ref 0–6)
EOSINOPHIL NFR BLD AUTO: 3 % (ref 0–6)
EOSINOPHIL NFR BLD AUTO: 4 % (ref 0–6)
EOSINOPHIL NFR BLD AUTO: 4 % (ref 0–6)
EOSINOPHIL NFR BLD AUTO: 5 % (ref 0–6)
ERYTHROCYTE [DISTWIDTH] IN BLOOD BY AUTOMATED COUNT: 12.2 % (ref 11.6–15.1)
ERYTHROCYTE [DISTWIDTH] IN BLOOD BY AUTOMATED COUNT: 12.4 % (ref 11.6–15.1)
ERYTHROCYTE [DISTWIDTH] IN BLOOD BY AUTOMATED COUNT: 12.6 % (ref 11.6–15.1)
ERYTHROCYTE [DISTWIDTH] IN BLOOD BY AUTOMATED COUNT: 12.7 % (ref 11.6–15.1)
ERYTHROCYTE [DISTWIDTH] IN BLOOD BY AUTOMATED COUNT: 12.8 % (ref 11.6–15.1)
ERYTHROCYTE [DISTWIDTH] IN BLOOD BY AUTOMATED COUNT: 14.9 % (ref 11.6–15.1)
ERYTHROCYTE [DISTWIDTH] IN BLOOD BY AUTOMATED COUNT: 15.4 % (ref 11.6–15.1)
ERYTHROCYTE [DISTWIDTH] IN BLOOD BY AUTOMATED COUNT: 15.7 % (ref 11.6–15.1)
ERYTHROCYTE [DISTWIDTH] IN BLOOD BY AUTOMATED COUNT: 15.9 % (ref 11.6–15.1)
ERYTHROCYTE [DISTWIDTH] IN BLOOD BY AUTOMATED COUNT: 16.1 % (ref 11.6–15.1)
EST. AVERAGE GLUCOSE BLD GHB EST-MCNC: 131 MG/DL
GFR SERPL CREATININE-BSD FRML MDRD: 45 ML/MIN/1.73SQ M
GFR SERPL CREATININE-BSD FRML MDRD: 50 ML/MIN/1.73SQ M
GFR SERPL CREATININE-BSD FRML MDRD: 55 ML/MIN/1.73SQ M
GFR SERPL CREATININE-BSD FRML MDRD: 56 ML/MIN/1.73SQ M
GFR SERPL CREATININE-BSD FRML MDRD: 60 ML/MIN/1.73SQ M
GFR SERPL CREATININE-BSD FRML MDRD: 64 ML/MIN/1.73SQ M
GFR SERPL CREATININE-BSD FRML MDRD: 68 ML/MIN/1.73SQ M
GFR SERPL CREATININE-BSD FRML MDRD: 72 ML/MIN/1.73SQ M
GFR SERPL CREATININE-BSD FRML MDRD: 80 ML/MIN/1.73SQ M
GFR SERPL CREATININE-BSD FRML MDRD: 81 ML/MIN/1.73SQ M
GFR SERPL CREATININE-BSD FRML MDRD: 82 ML/MIN/1.73SQ M
GFR SERPL CREATININE-BSD FRML MDRD: 83 ML/MIN/1.73SQ M
GFR SERPL CREATININE-BSD FRML MDRD: 83 ML/MIN/1.73SQ M
GFR SERPL CREATININE-BSD FRML MDRD: 85 ML/MIN/1.73SQ M
GLUCOSE P FAST SERPL-MCNC: 102 MG/DL (ref 65–99)
GLUCOSE SERPL-MCNC: 105 MG/DL (ref 65–140)
GLUCOSE SERPL-MCNC: 108 MG/DL (ref 65–140)
GLUCOSE SERPL-MCNC: 112 MG/DL (ref 65–140)
GLUCOSE SERPL-MCNC: 113 MG/DL (ref 65–140)
GLUCOSE SERPL-MCNC: 126 MG/DL (ref 65–140)
GLUCOSE SERPL-MCNC: 127 MG/DL (ref 65–140)
GLUCOSE SERPL-MCNC: 131 MG/DL (ref 65–140)
GLUCOSE SERPL-MCNC: 133 MG/DL (ref 65–140)
GLUCOSE SERPL-MCNC: 138 MG/DL (ref 65–140)
GLUCOSE SERPL-MCNC: 147 MG/DL (ref 65–140)
GLUCOSE SERPL-MCNC: 149 MG/DL (ref 65–140)
GLUCOSE SERPL-MCNC: 150 MG/DL (ref 65–140)
GLUCOSE SERPL-MCNC: 152 MG/DL (ref 65–140)
GLUCOSE SERPL-MCNC: 154 MG/DL (ref 65–140)
GLUCOSE SERPL-MCNC: 155 MG/DL (ref 65–140)
GLUCOSE SERPL-MCNC: 157 MG/DL (ref 65–140)
GLUCOSE SERPL-MCNC: 157 MG/DL (ref 65–140)
GLUCOSE SERPL-MCNC: 159 MG/DL (ref 65–140)
GLUCOSE SERPL-MCNC: 159 MG/DL (ref 65–140)
GLUCOSE SERPL-MCNC: 160 MG/DL (ref 65–140)
GLUCOSE SERPL-MCNC: 161 MG/DL (ref 65–140)
GLUCOSE SERPL-MCNC: 162 MG/DL (ref 65–140)
GLUCOSE SERPL-MCNC: 164 MG/DL (ref 65–140)
GLUCOSE SERPL-MCNC: 165 MG/DL (ref 65–140)
GLUCOSE SERPL-MCNC: 166 MG/DL (ref 65–140)
GLUCOSE SERPL-MCNC: 167 MG/DL (ref 65–140)
GLUCOSE SERPL-MCNC: 167 MG/DL (ref 65–140)
GLUCOSE SERPL-MCNC: 168 MG/DL (ref 65–140)
GLUCOSE SERPL-MCNC: 168 MG/DL (ref 65–140)
GLUCOSE SERPL-MCNC: 169 MG/DL (ref 65–140)
GLUCOSE SERPL-MCNC: 170 MG/DL (ref 65–140)
GLUCOSE SERPL-MCNC: 171 MG/DL (ref 65–140)
GLUCOSE SERPL-MCNC: 172 MG/DL (ref 65–140)
GLUCOSE SERPL-MCNC: 173 MG/DL (ref 65–140)
GLUCOSE SERPL-MCNC: 174 MG/DL (ref 65–140)
GLUCOSE SERPL-MCNC: 175 MG/DL (ref 65–140)
GLUCOSE SERPL-MCNC: 175 MG/DL (ref 65–140)
GLUCOSE SERPL-MCNC: 176 MG/DL (ref 65–140)
GLUCOSE SERPL-MCNC: 177 MG/DL (ref 65–140)
GLUCOSE SERPL-MCNC: 177 MG/DL (ref 65–140)
GLUCOSE SERPL-MCNC: 179 MG/DL (ref 65–140)
GLUCOSE SERPL-MCNC: 180 MG/DL (ref 65–140)
GLUCOSE SERPL-MCNC: 181 MG/DL (ref 65–140)
GLUCOSE SERPL-MCNC: 182 MG/DL (ref 65–140)
GLUCOSE SERPL-MCNC: 186 MG/DL (ref 65–140)
GLUCOSE SERPL-MCNC: 186 MG/DL (ref 65–140)
GLUCOSE SERPL-MCNC: 189 MG/DL (ref 65–140)
GLUCOSE SERPL-MCNC: 189 MG/DL (ref 65–140)
GLUCOSE SERPL-MCNC: 192 MG/DL (ref 65–140)
GLUCOSE SERPL-MCNC: 197 MG/DL (ref 65–140)
GLUCOSE SERPL-MCNC: 203 MG/DL (ref 65–140)
GLUCOSE SERPL-MCNC: 205 MG/DL (ref 65–140)
GLUCOSE SERPL-MCNC: 206 MG/DL (ref 65–140)
GLUCOSE SERPL-MCNC: 209 MG/DL (ref 65–140)
GLUCOSE SERPL-MCNC: 209 MG/DL (ref 65–140)
GLUCOSE SERPL-MCNC: 245 MG/DL (ref 65–140)
GLUCOSE UR STRIP-MCNC: NEGATIVE MG/DL
GLUCOSE UR STRIP-MCNC: NEGATIVE MG/DL
HBA1C MFR BLD: 6.2 % (ref 4.2–6.3)
HCO3 BLDA-SCNC: 20.1 MMOL/L (ref 24–30)
HCT VFR BLD AUTO: 22.4 % (ref 36.5–49.3)
HCT VFR BLD AUTO: 23.9 % (ref 36.5–49.3)
HCT VFR BLD AUTO: 24.4 % (ref 36.5–49.3)
HCT VFR BLD AUTO: 24.7 % (ref 36.5–49.3)
HCT VFR BLD AUTO: 28.1 % (ref 36.5–49.3)
HCT VFR BLD AUTO: 29.9 % (ref 36.5–49.3)
HCT VFR BLD AUTO: 36.3 % (ref 36.5–49.3)
HCT VFR BLD AUTO: 37 % (ref 36.5–49.3)
HCT VFR BLD AUTO: 37.9 % (ref 36.5–49.3)
HCT VFR BLD AUTO: 38 % (ref 36.5–49.3)
HCT VFR BLD AUTO: 38.7 % (ref 36.5–49.3)
HCT VFR BLD AUTO: 39.2 % (ref 36.5–49.3)
HCT VFR BLD AUTO: 41.9 % (ref 36.5–49.3)
HCT VFR BLD CALC: 25 % (ref 36.5–49.3)
HDLC SERPL-MCNC: 42 MG/DL (ref 40–60)
HGB BLD-MCNC: 10 G/DL (ref 12–17)
HGB BLD-MCNC: 12.3 G/DL (ref 12–17)
HGB BLD-MCNC: 12.5 G/DL (ref 12–17)
HGB BLD-MCNC: 12.6 G/DL (ref 12–17)
HGB BLD-MCNC: 12.6 G/DL (ref 12–17)
HGB BLD-MCNC: 12.8 G/DL (ref 12–17)
HGB BLD-MCNC: 13.2 G/DL (ref 12–17)
HGB BLD-MCNC: 14.4 G/DL (ref 12–17)
HGB BLD-MCNC: 7.2 G/DL (ref 12–17)
HGB BLD-MCNC: 7.8 G/DL (ref 12–17)
HGB BLD-MCNC: 8.1 G/DL (ref 12–17)
HGB BLD-MCNC: 8.2 G/DL (ref 12–17)
HGB BLD-MCNC: 8.5 G/DL (ref 12–17)
HGB BLD-MCNC: 9.5 G/DL (ref 12–17)
HGB BLDA-MCNC: 8.5 G/DL (ref 12–17)
HGB UR QL STRIP.AUTO: ABNORMAL
HGB UR QL STRIP.AUTO: NEGATIVE
IMM GRANULOCYTES # BLD AUTO: 0.02 THOUSAND/UL (ref 0–0.2)
IMM GRANULOCYTES # BLD AUTO: 0.03 THOUSAND/UL (ref 0–0.2)
IMM GRANULOCYTES # BLD AUTO: 0.05 THOUSAND/UL (ref 0–0.2)
IMM GRANULOCYTES # BLD AUTO: 0.06 THOUSAND/UL (ref 0–0.2)
IMM GRANULOCYTES # BLD AUTO: 0.07 THOUSAND/UL (ref 0–0.2)
IMM GRANULOCYTES NFR BLD AUTO: 0 % (ref 0–2)
IMM GRANULOCYTES NFR BLD AUTO: 1 % (ref 0–2)
IMM GRANULOCYTES NFR BLD AUTO: 1 % (ref 0–2)
INR PPP: 1.01 (ref 0.84–1.19)
INR PPP: 1.03 (ref 0.84–1.19)
INR PPP: 1.23 (ref 0.84–1.19)
KETONES UR STRIP-MCNC: NEGATIVE MG/DL
KETONES UR STRIP-MCNC: NEGATIVE MG/DL
LDLC SERPL CALC-MCNC: 39 MG/DL (ref 0–100)
LEUKOCYTE ESTERASE UR QL STRIP: ABNORMAL
LEUKOCYTE ESTERASE UR QL STRIP: ABNORMAL
LYMPHOCYTES # BLD AUTO: 0.49 THOUSANDS/ΜL (ref 0.6–4.47)
LYMPHOCYTES # BLD AUTO: 0.68 THOUSANDS/ΜL (ref 0.6–4.47)
LYMPHOCYTES # BLD AUTO: 0.99 THOUSANDS/ΜL (ref 0.6–4.47)
LYMPHOCYTES # BLD AUTO: 1.13 THOUSANDS/ΜL (ref 0.6–4.47)
LYMPHOCYTES # BLD AUTO: 1.23 THOUSANDS/ΜL (ref 0.6–4.47)
LYMPHOCYTES # BLD AUTO: 1.33 THOUSANDS/ΜL (ref 0.6–4.47)
LYMPHOCYTES # BLD AUTO: 1.38 THOUSANDS/ΜL (ref 0.6–4.47)
LYMPHOCYTES # BLD AUTO: 1.45 THOUSANDS/ΜL (ref 0.6–4.47)
LYMPHOCYTES # BLD AUTO: 1.62 THOUSANDS/ΜL (ref 0.6–4.47)
LYMPHOCYTES NFR BLD AUTO: 11 % (ref 14–44)
LYMPHOCYTES NFR BLD AUTO: 11 % (ref 14–44)
LYMPHOCYTES NFR BLD AUTO: 13 % (ref 14–44)
LYMPHOCYTES NFR BLD AUTO: 16 % (ref 14–44)
LYMPHOCYTES NFR BLD AUTO: 20 % (ref 14–44)
LYMPHOCYTES NFR BLD AUTO: 21 % (ref 14–44)
LYMPHOCYTES NFR BLD AUTO: 5 % (ref 14–44)
LYMPHOCYTES NFR BLD AUTO: 5 % (ref 14–44)
LYMPHOCYTES NFR BLD AUTO: 9 % (ref 14–44)
MAGNESIUM SERPL-MCNC: 2 MG/DL (ref 1.6–2.6)
MAGNESIUM SERPL-MCNC: 2.1 MG/DL (ref 1.6–2.6)
MAGNESIUM SERPL-MCNC: 2.2 MG/DL (ref 1.6–2.6)
MAGNESIUM SERPL-MCNC: 2.2 MG/DL (ref 1.6–2.6)
MAGNESIUM SERPL-MCNC: 2.4 MG/DL (ref 1.6–2.6)
MCH RBC QN AUTO: 29.4 PG (ref 26.8–34.3)
MCH RBC QN AUTO: 29.5 PG (ref 26.8–34.3)
MCH RBC QN AUTO: 29.6 PG (ref 26.8–34.3)
MCH RBC QN AUTO: 29.8 PG (ref 26.8–34.3)
MCH RBC QN AUTO: 29.9 PG (ref 26.8–34.3)
MCH RBC QN AUTO: 30.1 PG (ref 26.8–34.3)
MCH RBC QN AUTO: 30.3 PG (ref 26.8–34.3)
MCH RBC QN AUTO: 30.4 PG (ref 26.8–34.3)
MCH RBC QN AUTO: 30.6 PG (ref 26.8–34.3)
MCH RBC QN AUTO: 30.8 PG (ref 26.8–34.3)
MCH RBC QN AUTO: 30.8 PG (ref 26.8–34.3)
MCHC RBC AUTO-ENTMCNC: 32.1 G/DL (ref 31.4–37.4)
MCHC RBC AUTO-ENTMCNC: 32.6 G/DL (ref 31.4–37.4)
MCHC RBC AUTO-ENTMCNC: 33.1 G/DL (ref 31.4–37.4)
MCHC RBC AUTO-ENTMCNC: 33.2 G/DL (ref 31.4–37.4)
MCHC RBC AUTO-ENTMCNC: 33.4 G/DL (ref 31.4–37.4)
MCHC RBC AUTO-ENTMCNC: 33.7 G/DL (ref 31.4–37.4)
MCHC RBC AUTO-ENTMCNC: 33.8 G/DL (ref 31.4–37.4)
MCHC RBC AUTO-ENTMCNC: 33.8 G/DL (ref 31.4–37.4)
MCHC RBC AUTO-ENTMCNC: 33.9 G/DL (ref 31.4–37.4)
MCHC RBC AUTO-ENTMCNC: 34.4 G/DL (ref 31.4–37.4)
MCV RBC AUTO: 89 FL (ref 82–98)
MCV RBC AUTO: 90 FL (ref 82–98)
MCV RBC AUTO: 91 FL (ref 82–98)
MCV RBC AUTO: 92 FL (ref 82–98)
MCV RBC AUTO: 93 FL (ref 82–98)
MONOCYTES # BLD AUTO: 0.4 THOUSAND/ΜL (ref 0.17–1.22)
MONOCYTES # BLD AUTO: 0.6 THOUSAND/ΜL (ref 0.17–1.22)
MONOCYTES # BLD AUTO: 0.67 THOUSAND/ΜL (ref 0.17–1.22)
MONOCYTES # BLD AUTO: 0.72 THOUSAND/ΜL (ref 0.17–1.22)
MONOCYTES # BLD AUTO: 0.79 THOUSAND/ΜL (ref 0.17–1.22)
MONOCYTES # BLD AUTO: 0.81 THOUSAND/ΜL (ref 0.17–1.22)
MONOCYTES # BLD AUTO: 0.92 THOUSAND/ΜL (ref 0.17–1.22)
MONOCYTES # BLD AUTO: 1.04 THOUSAND/ΜL (ref 0.17–1.22)
MONOCYTES # BLD AUTO: 1.18 THOUSAND/ΜL (ref 0.17–1.22)
MONOCYTES NFR BLD AUTO: 10 % (ref 4–12)
MONOCYTES NFR BLD AUTO: 10 % (ref 4–12)
MONOCYTES NFR BLD AUTO: 14 % (ref 4–12)
MONOCYTES NFR BLD AUTO: 3 % (ref 4–12)
MONOCYTES NFR BLD AUTO: 6 % (ref 4–12)
MONOCYTES NFR BLD AUTO: 7 % (ref 4–12)
MONOCYTES NFR BLD AUTO: 8 % (ref 4–12)
MONOCYTES NFR BLD AUTO: 8 % (ref 4–12)
MONOCYTES NFR BLD AUTO: 9 % (ref 4–12)
MUCOUS THREADS UR QL AUTO: ABNORMAL
MUCOUS THREADS UR QL AUTO: ABNORMAL
NEUTROPHILS # BLD AUTO: 11.35 THOUSANDS/ΜL (ref 1.85–7.62)
NEUTROPHILS # BLD AUTO: 4.41 THOUSANDS/ΜL (ref 1.85–7.62)
NEUTROPHILS # BLD AUTO: 5.17 THOUSANDS/ΜL (ref 1.85–7.62)
NEUTROPHILS # BLD AUTO: 6.3 THOUSANDS/ΜL (ref 1.85–7.62)
NEUTROPHILS # BLD AUTO: 6.85 THOUSANDS/ΜL (ref 1.85–7.62)
NEUTROPHILS # BLD AUTO: 8.65 THOUSANDS/ΜL (ref 1.85–7.62)
NEUTROPHILS # BLD AUTO: 8.77 THOUSANDS/ΜL (ref 1.85–7.62)
NEUTROPHILS # BLD AUTO: 9.05 THOUSANDS/ΜL (ref 1.85–7.62)
NEUTROPHILS # BLD AUTO: 9.09 THOUSANDS/ΜL (ref 1.85–7.62)
NEUTS SEG NFR BLD AUTO: 60 % (ref 43–75)
NEUTS SEG NFR BLD AUTO: 66 % (ref 43–75)
NEUTS SEG NFR BLD AUTO: 71 % (ref 43–75)
NEUTS SEG NFR BLD AUTO: 74 % (ref 43–75)
NEUTS SEG NFR BLD AUTO: 77 % (ref 43–75)
NEUTS SEG NFR BLD AUTO: 82 % (ref 43–75)
NEUTS SEG NFR BLD AUTO: 83 % (ref 43–75)
NEUTS SEG NFR BLD AUTO: 84 % (ref 43–75)
NEUTS SEG NFR BLD AUTO: 92 % (ref 43–75)
NITRITE UR QL STRIP: NEGATIVE
NITRITE UR QL STRIP: NEGATIVE
NON-SQ EPI CELLS URNS QL MICRO: ABNORMAL /HPF
NON-SQ EPI CELLS URNS QL MICRO: ABNORMAL /HPF
NONHDLC SERPL-MCNC: 59 MG/DL
NRBC BLD AUTO-RTO: 0 /100 WBCS
P AXIS: 39 DEGREES
P AXIS: 51 DEGREES
P AXIS: 62 DEGREES
P AXIS: 63 DEGREES
P AXIS: 66 DEGREES
P AXIS: 82 DEGREES
P AXIS: 86 DEGREES
PCO2 BLD: 21 MMOL/L (ref 21–32)
PCO2 BLD: 37.7 MM HG (ref 42–50)
PH BLD: 7.33 [PH] (ref 7.3–7.4)
PH UR STRIP.AUTO: 5.5 [PH]
PH UR STRIP.AUTO: 5.5 [PH]
PHOSPHATE SERPL-MCNC: 3.3 MG/DL (ref 2.3–4.1)
PHOSPHATE SERPL-MCNC: 3.7 MG/DL (ref 2.3–4.1)
PLATELET # BLD AUTO: 175 THOUSANDS/UL (ref 149–390)
PLATELET # BLD AUTO: 196 THOUSANDS/UL (ref 149–390)
PLATELET # BLD AUTO: 199 THOUSANDS/UL (ref 149–390)
PLATELET # BLD AUTO: 205 THOUSANDS/UL (ref 149–390)
PLATELET # BLD AUTO: 206 THOUSANDS/UL (ref 149–390)
PLATELET # BLD AUTO: 222 THOUSANDS/UL (ref 149–390)
PLATELET # BLD AUTO: 223 THOUSANDS/UL (ref 149–390)
PLATELET # BLD AUTO: 229 THOUSANDS/UL (ref 149–390)
PLATELET # BLD AUTO: 231 THOUSANDS/UL (ref 149–390)
PLATELET # BLD AUTO: 243 THOUSANDS/UL (ref 149–390)
PLATELET # BLD AUTO: 248 THOUSANDS/UL (ref 149–390)
PLATELET # BLD AUTO: 256 THOUSANDS/UL (ref 149–390)
PLATELET # BLD AUTO: 287 THOUSANDS/UL (ref 149–390)
PMV BLD AUTO: 10.2 FL (ref 8.9–12.7)
PMV BLD AUTO: 10.2 FL (ref 8.9–12.7)
PMV BLD AUTO: 10.4 FL (ref 8.9–12.7)
PMV BLD AUTO: 9.3 FL (ref 8.9–12.7)
PMV BLD AUTO: 9.4 FL (ref 8.9–12.7)
PMV BLD AUTO: 9.5 FL (ref 8.9–12.7)
PMV BLD AUTO: 9.5 FL (ref 8.9–12.7)
PMV BLD AUTO: 9.6 FL (ref 8.9–12.7)
PMV BLD AUTO: 9.6 FL (ref 8.9–12.7)
PMV BLD AUTO: 9.7 FL (ref 8.9–12.7)
PMV BLD AUTO: 9.7 FL (ref 8.9–12.7)
PMV BLD AUTO: 9.8 FL (ref 8.9–12.7)
PMV BLD AUTO: 9.8 FL (ref 8.9–12.7)
PO2 BLD: 235 MM HG (ref 35–45)
POST-VOID RESIDUAL VOLUME, ML POC: 47.15 ML
POST-VOID RESIDUAL VOLUME, ML POC: 77.71 ML
POTASSIUM BLD-SCNC: 3.7 MMOL/L (ref 3.5–5.3)
POTASSIUM SERPL-SCNC: 3.5 MMOL/L (ref 3.5–5.3)
POTASSIUM SERPL-SCNC: 3.6 MMOL/L (ref 3.5–5.3)
POTASSIUM SERPL-SCNC: 3.6 MMOL/L (ref 3.5–5.3)
POTASSIUM SERPL-SCNC: 3.7 MMOL/L (ref 3.5–5.3)
POTASSIUM SERPL-SCNC: 3.8 MMOL/L (ref 3.5–5.3)
POTASSIUM SERPL-SCNC: 3.9 MMOL/L (ref 3.5–5.3)
POTASSIUM SERPL-SCNC: 4.1 MMOL/L (ref 3.5–5.3)
POTASSIUM SERPL-SCNC: 4.3 MMOL/L (ref 3.5–5.3)
PR INTERVAL: 172 MS
PR INTERVAL: 174 MS
PR INTERVAL: 175 MS
PR INTERVAL: 176 MS
PR INTERVAL: 176 MS
PR INTERVAL: 182 MS
PR INTERVAL: 182 MS
PR INTERVAL: 186 MS
PROCALCITONIN SERPL-MCNC: 0.06 NG/ML
PROT SERPL-MCNC: 7.2 G/DL (ref 6.4–8.2)
PROT UR STRIP-MCNC: NEGATIVE MG/DL
PROT UR STRIP-MCNC: NEGATIVE MG/DL
PROTHROMBIN TIME: 13 SECONDS (ref 11.6–14.5)
PROTHROMBIN TIME: 13.1 SECONDS (ref 11.6–14.5)
PROTHROMBIN TIME: 15.1 SECONDS (ref 11.6–14.5)
QRS AXIS: 0 DEGREES
QRS AXIS: 11 DEGREES
QRS AXIS: 163 DEGREES
QRS AXIS: 25 DEGREES
QRS AXIS: 44 DEGREES
QRS AXIS: 46 DEGREES
QRS AXIS: 46 DEGREES
QRS AXIS: 51 DEGREES
QRSD INTERVAL: 100 MS
QRSD INTERVAL: 88 MS
QRSD INTERVAL: 90 MS
QRSD INTERVAL: 90 MS
QRSD INTERVAL: 92 MS
QRSD INTERVAL: 94 MS
QRSD INTERVAL: 94 MS
QRSD INTERVAL: 96 MS
QT INTERVAL: 414 MS
QT INTERVAL: 416 MS
QT INTERVAL: 446 MS
QT INTERVAL: 446 MS
QT INTERVAL: 448 MS
QT INTERVAL: 452 MS
QT INTERVAL: 460 MS
QT INTERVAL: 467 MS
QTC INTERVAL: 440 MS
QTC INTERVAL: 441 MS
QTC INTERVAL: 458 MS
QTC INTERVAL: 458 MS
QTC INTERVAL: 460 MS
QTC INTERVAL: 469 MS
QTC INTERVAL: 474 MS
QTC INTERVAL: 482 MS
RBC # BLD AUTO: 2.42 MILLION/UL (ref 3.88–5.62)
RBC # BLD AUTO: 2.65 MILLION/UL (ref 3.88–5.62)
RBC # BLD AUTO: 2.75 MILLION/UL (ref 3.88–5.62)
RBC # BLD AUTO: 2.77 MILLION/UL (ref 3.88–5.62)
RBC # BLD AUTO: 3.16 MILLION/UL (ref 3.88–5.62)
RBC # BLD AUTO: 3.25 MILLION/UL (ref 3.88–5.62)
RBC # BLD AUTO: 4.05 MILLION/UL (ref 3.88–5.62)
RBC # BLD AUTO: 4.12 MILLION/UL (ref 3.88–5.62)
RBC # BLD AUTO: 4.18 MILLION/UL (ref 3.88–5.62)
RBC # BLD AUTO: 4.18 MILLION/UL (ref 3.88–5.62)
RBC # BLD AUTO: 4.28 MILLION/UL (ref 3.88–5.62)
RBC # BLD AUTO: 4.31 MILLION/UL (ref 3.88–5.62)
RBC # BLD AUTO: 4.67 MILLION/UL (ref 3.88–5.62)
RBC #/AREA URNS AUTO: ABNORMAL /HPF
RBC #/AREA URNS AUTO: ABNORMAL /HPF
RH BLD: POSITIVE
RH BLD: POSITIVE
SAO2 % BLD FROM PO2: 100 % (ref 95–98)
SODIUM BLD-SCNC: 140 MMOL/L (ref 136–145)
SODIUM SERPL-SCNC: 135 MMOL/L (ref 136–145)
SODIUM SERPL-SCNC: 137 MMOL/L (ref 136–145)
SODIUM SERPL-SCNC: 137 MMOL/L (ref 136–145)
SODIUM SERPL-SCNC: 139 MMOL/L (ref 136–145)
SODIUM SERPL-SCNC: 140 MMOL/L (ref 136–145)
SODIUM SERPL-SCNC: 141 MMOL/L (ref 136–145)
SODIUM SERPL-SCNC: 142 MMOL/L (ref 136–145)
SODIUM SERPL-SCNC: 142 MMOL/L (ref 136–145)
SODIUM SERPL-SCNC: 144 MMOL/L (ref 136–145)
SODIUM SERPL-SCNC: 145 MMOL/L (ref 136–145)
SODIUM SERPL-SCNC: 145 MMOL/L (ref 136–145)
SP GR UR STRIP.AUTO: 1.01 (ref 1–1.03)
SP GR UR STRIP.AUTO: 1.02 (ref 1–1.03)
SPECIMEN EXPIRATION DATE: NORMAL
SPECIMEN EXPIRATION DATE: NORMAL
SPECIMEN SOURCE: ABNORMAL
T WAVE AXIS: 117 DEGREES
T WAVE AXIS: 14 DEGREES
T WAVE AXIS: 42 DEGREES
T WAVE AXIS: 53 DEGREES
T WAVE AXIS: 62 DEGREES
T WAVE AXIS: 68 DEGREES
T WAVE AXIS: 87 DEGREES
T WAVE AXIS: 91 DEGREES
TRIGL SERPL-MCNC: 99 MG/DL
TROPONIN I SERPL-MCNC: 0.09 NG/ML
TROPONIN I SERPL-MCNC: 0.11 NG/ML
TROPONIN I SERPL-MCNC: 0.11 NG/ML
TROPONIN I SERPL-MCNC: <0.02 NG/ML
UNIT DISPENSE STATUS: NORMAL
UNIT DISPENSE STATUS: NORMAL
UNIT PRODUCT CODE: NORMAL
UNIT PRODUCT CODE: NORMAL
UNIT RH: NORMAL
UNIT RH: NORMAL
UROBILINOGEN UR QL STRIP.AUTO: 0.2 E.U./DL
UROBILINOGEN UR QL STRIP.AUTO: 0.2 E.U./DL
VENTRICULAR RATE: 59 BPM
VENTRICULAR RATE: 62 BPM
VENTRICULAR RATE: 64 BPM
VENTRICULAR RATE: 66 BPM
VENTRICULAR RATE: 68 BPM
VENTRICULAR RATE: 73 BPM
WBC # BLD AUTO: 10.21 THOUSAND/UL (ref 4.31–10.16)
WBC # BLD AUTO: 10.72 THOUSAND/UL (ref 4.31–10.16)
WBC # BLD AUTO: 10.91 THOUSAND/UL (ref 4.31–10.16)
WBC # BLD AUTO: 11.8 THOUSAND/UL (ref 4.31–10.16)
WBC # BLD AUTO: 12.52 THOUSAND/UL (ref 4.31–10.16)
WBC # BLD AUTO: 14.07 THOUSAND/UL (ref 4.31–10.16)
WBC # BLD AUTO: 15.13 THOUSAND/UL (ref 4.31–10.16)
WBC # BLD AUTO: 7.28 THOUSAND/UL (ref 4.31–10.16)
WBC # BLD AUTO: 7.3 THOUSAND/UL (ref 4.31–10.16)
WBC # BLD AUTO: 7.8 THOUSAND/UL (ref 4.31–10.16)
WBC # BLD AUTO: 8.87 THOUSAND/UL (ref 4.31–10.16)
WBC # BLD AUTO: 9.34 THOUSAND/UL (ref 4.31–10.16)
WBC # BLD AUTO: 9.4 THOUSAND/UL (ref 4.31–10.16)
WBC #/AREA URNS AUTO: ABNORMAL /HPF
WBC #/AREA URNS AUTO: ABNORMAL /HPF

## 2019-01-01 PROCEDURE — 93306 TTE W/DOPPLER COMPLETE: CPT | Performed by: INTERNAL MEDICINE

## 2019-01-01 PROCEDURE — 99233 SBSQ HOSP IP/OBS HIGH 50: CPT | Performed by: RADIOLOGY

## 2019-01-01 PROCEDURE — 85730 THROMBOPLASTIN TIME PARTIAL: CPT | Performed by: EMERGENCY MEDICINE

## 2019-01-01 PROCEDURE — 99222 1ST HOSP IP/OBS MODERATE 55: CPT | Performed by: INTERNAL MEDICINE

## 2019-01-01 PROCEDURE — 82948 REAGENT STRIP/BLOOD GLUCOSE: CPT

## 2019-01-01 PROCEDURE — 99223 1ST HOSP IP/OBS HIGH 75: CPT | Performed by: INTERNAL MEDICINE

## 2019-01-01 PROCEDURE — 99285 EMERGENCY DEPT VISIT HI MDM: CPT

## 2019-01-01 PROCEDURE — 93005 ELECTROCARDIOGRAM TRACING: CPT

## 2019-01-01 PROCEDURE — 84295 ASSAY OF SERUM SODIUM: CPT

## 2019-01-01 PROCEDURE — 82947 ASSAY GLUCOSE BLOOD QUANT: CPT

## 2019-01-01 PROCEDURE — 70450 CT HEAD/BRAIN W/O DYE: CPT

## 2019-01-01 PROCEDURE — 83735 ASSAY OF MAGNESIUM: CPT | Performed by: NURSE PRACTITIONER

## 2019-01-01 PROCEDURE — 80048 BASIC METABOLIC PNL TOTAL CA: CPT | Performed by: NURSE PRACTITIONER

## 2019-01-01 PROCEDURE — 93227 XTRNL ECG REC<48 HR R&I: CPT | Performed by: INTERNAL MEDICINE

## 2019-01-01 PROCEDURE — 99291 CRITICAL CARE FIRST HOUR: CPT | Performed by: EMERGENCY MEDICINE

## 2019-01-01 PROCEDURE — 84484 ASSAY OF TROPONIN QUANT: CPT | Performed by: INTERNAL MEDICINE

## 2019-01-01 PROCEDURE — 99284 EMERGENCY DEPT VISIT MOD MDM: CPT | Performed by: EMERGENCY MEDICINE

## 2019-01-01 PROCEDURE — 84100 ASSAY OF PHOSPHORUS: CPT | Performed by: INTERNAL MEDICINE

## 2019-01-01 PROCEDURE — 5A1945Z RESPIRATORY VENTILATION, 24-96 CONSECUTIVE HOURS: ICD-10-PCS | Performed by: EMERGENCY MEDICINE

## 2019-01-01 PROCEDURE — NC001 PR NO CHARGE

## 2019-01-01 PROCEDURE — 71045 X-RAY EXAM CHEST 1 VIEW: CPT

## 2019-01-01 PROCEDURE — 99214 OFFICE O/P EST MOD 30 MIN: CPT | Performed by: INTERNAL MEDICINE

## 2019-01-01 PROCEDURE — 85025 COMPLETE CBC W/AUTO DIFF WBC: CPT | Performed by: EMERGENCY MEDICINE

## 2019-01-01 PROCEDURE — NC001 PR NO CHARGE: Performed by: EMERGENCY MEDICINE

## 2019-01-01 PROCEDURE — 94760 N-INVAS EAR/PLS OXIMETRY 1: CPT

## 2019-01-01 PROCEDURE — 93306 TTE W/DOPPLER COMPLETE: CPT

## 2019-01-01 PROCEDURE — 90732 PPSV23 VACC 2 YRS+ SUBQ/IM: CPT

## 2019-01-01 PROCEDURE — 94002 VENT MGMT INPAT INIT DAY: CPT

## 2019-01-01 PROCEDURE — 94664 DEMO&/EVAL PT USE INHALER: CPT

## 2019-01-01 PROCEDURE — 99232 SBSQ HOSP IP/OBS MODERATE 35: CPT | Performed by: INTERNAL MEDICINE

## 2019-01-01 PROCEDURE — 36415 COLL VENOUS BLD VENIPUNCTURE: CPT

## 2019-01-01 PROCEDURE — 85027 COMPLETE CBC AUTOMATED: CPT | Performed by: NURSE PRACTITIONER

## 2019-01-01 PROCEDURE — 0BH17EZ INSERTION OF ENDOTRACHEAL AIRWAY INTO TRACHEA, VIA NATURAL OR ARTIFICIAL OPENING: ICD-10-PCS | Performed by: EMERGENCY MEDICINE

## 2019-01-01 PROCEDURE — 93010 ELECTROCARDIOGRAM REPORT: CPT | Performed by: INTERNAL MEDICINE

## 2019-01-01 PROCEDURE — 86901 BLOOD TYPING SEROLOGIC RH(D): CPT | Performed by: NURSE ANESTHETIST, CERTIFIED REGISTERED

## 2019-01-01 PROCEDURE — 86900 BLOOD TYPING SEROLOGIC ABO: CPT | Performed by: NURSE ANESTHETIST, CERTIFIED REGISTERED

## 2019-01-01 PROCEDURE — 70496 CT ANGIOGRAPHY HEAD: CPT

## 2019-01-01 PROCEDURE — 84132 ASSAY OF SERUM POTASSIUM: CPT

## 2019-01-01 PROCEDURE — 80048 BASIC METABOLIC PNL TOTAL CA: CPT | Performed by: EMERGENCY MEDICINE

## 2019-01-01 PROCEDURE — 82803 BLOOD GASES ANY COMBINATION: CPT

## 2019-01-01 PROCEDURE — 96361 HYDRATE IV INFUSION ADD-ON: CPT

## 2019-01-01 PROCEDURE — 36569 INSJ PICC 5 YR+ W/O IMAGING: CPT

## 2019-01-01 PROCEDURE — 80048 BASIC METABOLIC PNL TOTAL CA: CPT

## 2019-01-01 PROCEDURE — 86920 COMPATIBILITY TEST SPIN: CPT

## 2019-01-01 PROCEDURE — 03HY32Z INSERTION OF MONITORING DEVICE INTO UPPER ARTERY, PERCUTANEOUS APPROACH: ICD-10-PCS | Performed by: STUDENT IN AN ORGANIZED HEALTH CARE EDUCATION/TRAINING PROGRAM

## 2019-01-01 PROCEDURE — C1894 INTRO/SHEATH, NON-LASER: HCPCS

## 2019-01-01 PROCEDURE — 99231 SBSQ HOSP IP/OBS SF/LOW 25: CPT | Performed by: PSYCHIATRY & NEUROLOGY

## 2019-01-01 PROCEDURE — 99223 1ST HOSP IP/OBS HIGH 75: CPT | Performed by: RADIOLOGY

## 2019-01-01 PROCEDURE — 81001 URINALYSIS AUTO W/SCOPE: CPT | Performed by: EMERGENCY MEDICINE

## 2019-01-01 PROCEDURE — 85027 COMPLETE CBC AUTOMATED: CPT | Performed by: EMERGENCY MEDICINE

## 2019-01-01 PROCEDURE — 96360 HYDRATION IV INFUSION INIT: CPT

## 2019-01-01 PROCEDURE — 99232 SBSQ HOSP IP/OBS MODERATE 35: CPT | Performed by: PSYCHIATRY & NEUROLOGY

## 2019-01-01 PROCEDURE — 83735 ASSAY OF MAGNESIUM: CPT | Performed by: PHYSICIAN ASSISTANT

## 2019-01-01 PROCEDURE — 36415 COLL VENOUS BLD VENIPUNCTURE: CPT | Performed by: EMERGENCY MEDICINE

## 2019-01-01 PROCEDURE — 85610 PROTHROMBIN TIME: CPT | Performed by: EMERGENCY MEDICINE

## 2019-01-01 PROCEDURE — 83735 ASSAY OF MAGNESIUM: CPT | Performed by: EMERGENCY MEDICINE

## 2019-01-01 PROCEDURE — 61645 PERQ ART M-THROMBECT &/NFS: CPT | Performed by: RADIOLOGY

## 2019-01-01 PROCEDURE — 85730 THROMBOPLASTIN TIME PARTIAL: CPT | Performed by: STUDENT IN AN ORGANIZED HEALTH CARE EDUCATION/TRAINING PROGRAM

## 2019-01-01 PROCEDURE — C9113 INJ PANTOPRAZOLE SODIUM, VIA: HCPCS | Performed by: EMERGENCY MEDICINE

## 2019-01-01 PROCEDURE — 03CY3ZZ EXTIRPATION OF MATTER FROM UPPER ARTERY, PERCUTANEOUS APPROACH: ICD-10-PCS | Performed by: EMERGENCY MEDICINE

## 2019-01-01 PROCEDURE — C1757 CATH, THROMBECTOMY/EMBOLECT: HCPCS

## 2019-01-01 PROCEDURE — 97530 THERAPEUTIC ACTIVITIES: CPT

## 2019-01-01 PROCEDURE — 99239 HOSP IP/OBS DSCHRG MGMT >30: CPT | Performed by: INTERNAL MEDICINE

## 2019-01-01 PROCEDURE — C1887 CATHETER, GUIDING: HCPCS

## 2019-01-01 PROCEDURE — 83735 ASSAY OF MAGNESIUM: CPT | Performed by: INTERNAL MEDICINE

## 2019-01-01 PROCEDURE — 99024 POSTOP FOLLOW-UP VISIT: CPT | Performed by: RADIOLOGY

## 2019-01-01 PROCEDURE — 86901 BLOOD TYPING SEROLOGIC RH(D): CPT | Performed by: EMERGENCY MEDICINE

## 2019-01-01 PROCEDURE — 4A133J1 MONITORING OF ARTERIAL PULSE, PERIPHERAL, PERCUTANEOUS APPROACH: ICD-10-PCS | Performed by: STUDENT IN AN ORGANIZED HEALTH CARE EDUCATION/TRAINING PROGRAM

## 2019-01-01 PROCEDURE — 93000 ELECTROCARDIOGRAM COMPLETE: CPT | Performed by: INTERNAL MEDICINE

## 2019-01-01 PROCEDURE — 80061 LIPID PANEL: CPT | Performed by: INTERNAL MEDICINE

## 2019-01-01 PROCEDURE — G8987 SELF CARE CURRENT STATUS: HCPCS

## 2019-01-01 PROCEDURE — 80048 BASIC METABOLIC PNL TOTAL CA: CPT | Performed by: INTERNAL MEDICINE

## 2019-01-01 PROCEDURE — 99223 1ST HOSP IP/OBS HIGH 75: CPT | Performed by: PSYCHIATRY & NEUROLOGY

## 2019-01-01 PROCEDURE — 84484 ASSAY OF TROPONIN QUANT: CPT | Performed by: EMERGENCY MEDICINE

## 2019-01-01 PROCEDURE — 0042T HB CT PERFUSION W/CONTRAST CBF: CPT

## 2019-01-01 PROCEDURE — 76937 US GUIDE VASCULAR ACCESS: CPT

## 2019-01-01 PROCEDURE — NC001 PR NO CHARGE: Performed by: PSYCHIATRY & NEUROLOGY

## 2019-01-01 PROCEDURE — 36227 PLACE CATH XTRNL CAROTID: CPT

## 2019-01-01 PROCEDURE — 86850 RBC ANTIBODY SCREEN: CPT | Performed by: NURSE ANESTHETIST, CERTIFIED REGISTERED

## 2019-01-01 PROCEDURE — 52000 CYSTOURETHROSCOPY: CPT | Performed by: UROLOGY

## 2019-01-01 PROCEDURE — 97163 PT EVAL HIGH COMPLEX 45 MIN: CPT

## 2019-01-01 PROCEDURE — 84100 ASSAY OF PHOSPHORUS: CPT | Performed by: EMERGENCY MEDICINE

## 2019-01-01 PROCEDURE — 36222 PLACE CATH CAROTID/INOM ART: CPT

## 2019-01-01 PROCEDURE — 99214 OFFICE O/P EST MOD 30 MIN: CPT | Performed by: NURSE PRACTITIONER

## 2019-01-01 PROCEDURE — 94003 VENT MGMT INPAT SUBQ DAY: CPT

## 2019-01-01 PROCEDURE — 99221 1ST HOSP IP/OBS SF/LOW 40: CPT | Performed by: NEUROLOGICAL SURGERY

## 2019-01-01 PROCEDURE — 80053 COMPREHEN METABOLIC PANEL: CPT | Performed by: INTERNAL MEDICINE

## 2019-01-01 PROCEDURE — 99292 CRITICAL CARE ADDL 30 MIN: CPT | Performed by: EMERGENCY MEDICINE

## 2019-01-01 PROCEDURE — 85025 COMPLETE CBC W/AUTO DIFF WBC: CPT | Performed by: INTERNAL MEDICINE

## 2019-01-01 PROCEDURE — 93226 XTRNL ECG REC<48 HR SCAN A/R: CPT

## 2019-01-01 PROCEDURE — 99291 CRITICAL CARE FIRST HOUR: CPT | Performed by: INTERNAL MEDICINE

## 2019-01-01 PROCEDURE — 85025 COMPLETE CBC W/AUTO DIFF WBC: CPT | Performed by: RADIOLOGY

## 2019-01-01 PROCEDURE — 70551 MRI BRAIN STEM W/O DYE: CPT

## 2019-01-01 PROCEDURE — 80048 BASIC METABOLIC PNL TOTAL CA: CPT | Performed by: STUDENT IN AN ORGANIZED HEALTH CARE EDUCATION/TRAINING PROGRAM

## 2019-01-01 PROCEDURE — 36226 PLACE CATH VERTEBRAL ART: CPT

## 2019-01-01 PROCEDURE — 71046 X-RAY EXAM CHEST 2 VIEWS: CPT

## 2019-01-01 PROCEDURE — 82550 ASSAY OF CK (CPK): CPT | Performed by: EMERGENCY MEDICINE

## 2019-01-01 PROCEDURE — 76937 US GUIDE VASCULAR ACCESS: CPT | Performed by: RADIOLOGY

## 2019-01-01 PROCEDURE — 70498 CT ANGIOGRAPHY NECK: CPT

## 2019-01-01 PROCEDURE — C1760 CLOSURE DEV, VASC: HCPCS

## 2019-01-01 PROCEDURE — C1769 GUIDE WIRE: HCPCS

## 2019-01-01 PROCEDURE — 99285 EMERGENCY DEPT VISIT HI MDM: CPT | Performed by: EMERGENCY MEDICINE

## 2019-01-01 PROCEDURE — 85018 HEMOGLOBIN: CPT | Performed by: STUDENT IN AN ORGANIZED HEALTH CARE EDUCATION/TRAINING PROGRAM

## 2019-01-01 PROCEDURE — 80048 BASIC METABOLIC PNL TOTAL CA: CPT | Performed by: PHYSICIAN ASSISTANT

## 2019-01-01 PROCEDURE — 99356 PR PROLONGED SVC I/P OR OBS SETTING 1ST HOUR: CPT | Performed by: INTERNAL MEDICINE

## 2019-01-01 PROCEDURE — 61630 BALO ANGIOPLASTY ICR PERQ: CPT

## 2019-01-01 PROCEDURE — G8988 SELF CARE GOAL STATUS: HCPCS

## 2019-01-01 PROCEDURE — 51798 US URINE CAPACITY MEASURE: CPT | Performed by: NURSE PRACTITIONER

## 2019-01-01 PROCEDURE — 85027 COMPLETE CBC AUTOMATED: CPT | Performed by: INTERNAL MEDICINE

## 2019-01-01 PROCEDURE — 85610 PROTHROMBIN TIME: CPT | Performed by: STUDENT IN AN ORGANIZED HEALTH CARE EDUCATION/TRAINING PROGRAM

## 2019-01-01 PROCEDURE — G8978 MOBILITY CURRENT STATUS: HCPCS

## 2019-01-01 PROCEDURE — 97112 NEUROMUSCULAR REEDUCATION: CPT

## 2019-01-01 PROCEDURE — 82330 ASSAY OF CALCIUM: CPT

## 2019-01-01 PROCEDURE — 037P3ZZ DILATION OF RIGHT VERTEBRAL ARTERY, PERCUTANEOUS APPROACH: ICD-10-PCS | Performed by: EMERGENCY MEDICINE

## 2019-01-01 PROCEDURE — 86850 RBC ANTIBODY SCREEN: CPT | Performed by: EMERGENCY MEDICINE

## 2019-01-01 PROCEDURE — 96374 THER/PROPH/DIAG INJ IV PUSH: CPT

## 2019-01-01 PROCEDURE — 97535 SELF CARE MNGMENT TRAINING: CPT

## 2019-01-01 PROCEDURE — 99233 SBSQ HOSP IP/OBS HIGH 50: CPT | Performed by: NURSE PRACTITIONER

## 2019-01-01 PROCEDURE — 85014 HEMATOCRIT: CPT

## 2019-01-01 PROCEDURE — 93225 XTRNL ECG REC<48 HRS REC: CPT

## 2019-01-01 PROCEDURE — 97167 OT EVAL HIGH COMPLEX 60 MIN: CPT

## 2019-01-01 PROCEDURE — 85025 COMPLETE CBC W/AUTO DIFF WBC: CPT | Performed by: PHYSICIAN ASSISTANT

## 2019-01-01 PROCEDURE — C1751 CATH, INF, PER/CENT/MIDLINE: HCPCS

## 2019-01-01 PROCEDURE — 72141 MRI NECK SPINE W/O DYE: CPT

## 2019-01-01 PROCEDURE — 93000 ELECTROCARDIOGRAM COMPLETE: CPT | Performed by: NURSE PRACTITIONER

## 2019-01-01 PROCEDURE — 03CG3Z7 EXTIRPATION OF MATTER FROM INTRACRANIAL ARTERY USING STENT RETRIEVER, PERCUTANEOUS APPROACH: ICD-10-PCS | Performed by: EMERGENCY MEDICINE

## 2019-01-01 PROCEDURE — G8979 MOBILITY GOAL STATUS: HCPCS

## 2019-01-01 PROCEDURE — 30233N1 TRANSFUSION OF NONAUTOLOGOUS RED BLOOD CELLS INTO PERIPHERAL VEIN, PERCUTANEOUS APPROACH: ICD-10-PCS | Performed by: EMERGENCY MEDICINE

## 2019-01-01 PROCEDURE — P9016 RBC LEUKOCYTES REDUCED: HCPCS

## 2019-01-01 PROCEDURE — 84145 PROCALCITONIN (PCT): CPT | Performed by: INTERNAL MEDICINE

## 2019-01-01 PROCEDURE — 82553 CREATINE MB FRACTION: CPT | Performed by: EMERGENCY MEDICINE

## 2019-01-01 PROCEDURE — G0009 ADMIN PNEUMOCOCCAL VACCINE: HCPCS

## 2019-01-01 PROCEDURE — NC001 PR NO CHARGE: Performed by: STUDENT IN AN ORGANIZED HEALTH CARE EDUCATION/TRAINING PROGRAM

## 2019-01-01 PROCEDURE — 1124F ACP DISCUSS-NO DSCNMKR DOCD: CPT | Performed by: INTERNAL MEDICINE

## 2019-01-01 PROCEDURE — 83036 HEMOGLOBIN GLYCOSYLATED A1C: CPT | Performed by: NURSE PRACTITIONER

## 2019-01-01 PROCEDURE — 4A133B1 MONITORING OF ARTERIAL PRESSURE, PERIPHERAL, PERCUTANEOUS APPROACH: ICD-10-PCS | Performed by: STUDENT IN AN ORGANIZED HEALTH CARE EDUCATION/TRAINING PROGRAM

## 2019-01-01 PROCEDURE — 86900 BLOOD TYPING SEROLOGIC ABO: CPT | Performed by: EMERGENCY MEDICINE

## 2019-01-01 PROCEDURE — 51798 US URINE CAPACITY MEASURE: CPT

## 2019-01-01 PROCEDURE — 96375 TX/PRO/DX INJ NEW DRUG ADDON: CPT

## 2019-01-01 RX ORDER — ASPIRIN 325 MG
325 TABLET ORAL ONCE
Status: COMPLETED | OUTPATIENT
Start: 2019-01-01 | End: 2019-01-01

## 2019-01-01 RX ORDER — LABETALOL 20 MG/4 ML (5 MG/ML) INTRAVENOUS SYRINGE
10 ONCE
Status: COMPLETED | OUTPATIENT
Start: 2019-01-01 | End: 2019-01-01

## 2019-01-01 RX ORDER — SODIUM CHLORIDE 9 MG/ML
125 INJECTION, SOLUTION INTRAVENOUS CONTINUOUS
Status: DISCONTINUED | OUTPATIENT
Start: 2019-01-01 | End: 2019-01-01 | Stop reason: HOSPADM

## 2019-01-01 RX ORDER — FENTANYL CITRATE 50 UG/ML
50 INJECTION, SOLUTION INTRAMUSCULAR; INTRAVENOUS ONCE
Status: COMPLETED | OUTPATIENT
Start: 2019-01-01 | End: 2019-01-01

## 2019-01-01 RX ORDER — HYDROCHLOROTHIAZIDE 12.5 MG/1
12.5 TABLET ORAL DAILY
Qty: 30 TABLET | Refills: 5 | Status: SHIPPED | OUTPATIENT
Start: 2019-01-01

## 2019-01-01 RX ORDER — KETOROLAC TROMETHAMINE 30 MG/ML
15 INJECTION, SOLUTION INTRAMUSCULAR; INTRAVENOUS EVERY 6 HOURS PRN
Status: DISCONTINUED | OUTPATIENT
Start: 2019-01-01 | End: 2019-01-01

## 2019-01-01 RX ORDER — HALOPERIDOL 5 MG/ML
0.5 INJECTION INTRAMUSCULAR
Status: DISCONTINUED | OUTPATIENT
Start: 2019-01-01 | End: 2019-10-12 | Stop reason: HOSPADM

## 2019-01-01 RX ORDER — KETOROLAC TROMETHAMINE 30 MG/ML
15 INJECTION, SOLUTION INTRAMUSCULAR; INTRAVENOUS ONCE
Status: COMPLETED | OUTPATIENT
Start: 2019-01-01 | End: 2019-01-01

## 2019-01-01 RX ORDER — ONDANSETRON 2 MG/ML
4 INJECTION INTRAMUSCULAR; INTRAVENOUS ONCE
Status: COMPLETED | OUTPATIENT
Start: 2019-01-01 | End: 2019-01-01

## 2019-01-01 RX ORDER — FENTANYL CITRATE 50 UG/ML
25 INJECTION, SOLUTION INTRAMUSCULAR; INTRAVENOUS ONCE
Status: COMPLETED | OUTPATIENT
Start: 2019-01-01 | End: 2019-01-01

## 2019-01-01 RX ORDER — ALBUMIN, HUMAN INJ 5% 5 %
SOLUTION INTRAVENOUS CONTINUOUS PRN
Status: DISCONTINUED | OUTPATIENT
Start: 2019-01-01 | End: 2019-01-01 | Stop reason: SURG

## 2019-01-01 RX ORDER — LABETALOL 20 MG/4 ML (5 MG/ML) INTRAVENOUS SYRINGE
Status: COMPLETED
Start: 2019-01-01 | End: 2019-01-01

## 2019-01-01 RX ORDER — CLOPIDOGREL BISULFATE 75 MG/1
75 TABLET ORAL DAILY
Qty: 21 TABLET | Refills: 0
Start: 2019-01-01 | End: 2019-10-24

## 2019-01-01 RX ORDER — DEXAMETHASONE SODIUM PHOSPHATE 10 MG/ML
INJECTION, SOLUTION INTRAMUSCULAR; INTRAVENOUS AS NEEDED
Status: DISCONTINUED | OUTPATIENT
Start: 2019-01-01 | End: 2019-01-01 | Stop reason: SURG

## 2019-01-01 RX ORDER — ESCITALOPRAM OXALATE 5 MG/1
5 TABLET ORAL DAILY
Qty: 30 TABLET | Refills: 5 | Status: SHIPPED | OUTPATIENT
Start: 2019-01-01 | End: 2019-01-01

## 2019-01-01 RX ORDER — ONDANSETRON 2 MG/ML
INJECTION INTRAMUSCULAR; INTRAVENOUS
Status: COMPLETED
Start: 2019-01-01 | End: 2019-01-01

## 2019-01-01 RX ORDER — FENTANYL CITRATE 50 UG/ML
50 INJECTION, SOLUTION INTRAMUSCULAR; INTRAVENOUS EVERY 2 HOUR PRN
Status: DISCONTINUED | OUTPATIENT
Start: 2019-01-01 | End: 2019-01-01

## 2019-01-01 RX ORDER — METOPROLOL TARTRATE 50 MG/1
50 TABLET, FILM COATED ORAL EVERY 12 HOURS SCHEDULED
Status: DISCONTINUED | OUTPATIENT
Start: 2019-01-01 | End: 2019-01-01

## 2019-01-01 RX ORDER — FENTANYL CITRATE-0.9 % NACL/PF 10 MCG/ML
25 PLASTIC BAG, INJECTION (ML) INTRAVENOUS CONTINUOUS
Status: DISCONTINUED | OUTPATIENT
Start: 2019-01-01 | End: 2019-10-12 | Stop reason: HOSPADM

## 2019-01-01 RX ORDER — ATORVASTATIN CALCIUM 40 MG/1
40 TABLET, FILM COATED ORAL
Status: DISCONTINUED | OUTPATIENT
Start: 2019-01-01 | End: 2019-01-01 | Stop reason: HOSPADM

## 2019-01-01 RX ORDER — GLYCOPYRROLATE 0.2 MG/ML
0.2 INJECTION INTRAMUSCULAR; INTRAVENOUS
Status: DISCONTINUED | OUTPATIENT
Start: 2019-01-01 | End: 2019-10-12 | Stop reason: HOSPADM

## 2019-01-01 RX ORDER — MONTELUKAST SODIUM 10 MG/1
10 TABLET ORAL DAILY
Status: DISCONTINUED | OUTPATIENT
Start: 2019-01-01 | End: 2019-01-01 | Stop reason: HOSPADM

## 2019-01-01 RX ORDER — FENTANYL CITRATE 50 UG/ML
25 INJECTION, SOLUTION INTRAMUSCULAR; INTRAVENOUS
Status: DISCONTINUED | OUTPATIENT
Start: 2019-01-01 | End: 2019-10-12 | Stop reason: HOSPADM

## 2019-01-01 RX ORDER — ASPIRIN 81 MG/1
81 TABLET ORAL DAILY
Refills: 0
Start: 2019-01-01

## 2019-01-01 RX ORDER — ASPIRIN 81 MG/1
81 TABLET ORAL DAILY
Status: DISCONTINUED | OUTPATIENT
Start: 2019-01-01 | End: 2019-01-01

## 2019-01-01 RX ORDER — NITROGLYCERIN 0.4 MG/1
TABLET SUBLINGUAL
Qty: 100 TABLET | Refills: 0 | Status: SHIPPED | OUTPATIENT
Start: 2019-01-01 | End: 2019-01-01 | Stop reason: SDUPTHER

## 2019-01-01 RX ORDER — LIDOCAINE HYDROCHLORIDE 10 MG/ML
INJECTION, SOLUTION INFILTRATION; PERINEURAL AS NEEDED
Status: DISCONTINUED | OUTPATIENT
Start: 2019-01-01 | End: 2019-01-01 | Stop reason: SURG

## 2019-01-01 RX ORDER — NITROGLYCERIN 0.4 MG/1
TABLET SUBLINGUAL
Qty: 100 TABLET | Refills: 0 | Status: SHIPPED | OUTPATIENT
Start: 2019-01-01

## 2019-01-01 RX ORDER — NITROGLYCERIN 0.4 MG/1
0.4 TABLET SUBLINGUAL
Status: DISCONTINUED | OUTPATIENT
Start: 2019-01-01 | End: 2019-01-01 | Stop reason: HOSPADM

## 2019-01-01 RX ORDER — TAMSULOSIN HYDROCHLORIDE 0.4 MG/1
0.4 CAPSULE ORAL
Qty: 90 CAPSULE | Refills: 2 | Status: SHIPPED | OUTPATIENT
Start: 2019-01-01

## 2019-01-01 RX ORDER — SODIUM CHLORIDE 9 MG/ML
125 INJECTION, SOLUTION INTRAVENOUS CONTINUOUS
Status: DISCONTINUED | OUTPATIENT
Start: 2019-01-01 | End: 2019-01-01

## 2019-01-01 RX ORDER — 3% SODIUM CHLORIDE 3 G/100ML
30 INJECTION, SOLUTION INTRAVENOUS CONTINUOUS
Status: DISCONTINUED | OUTPATIENT
Start: 2019-01-01 | End: 2019-01-01

## 2019-01-01 RX ORDER — HEPARIN SODIUM 10000 [USP'U]/100ML
300-2000 INJECTION, SOLUTION INTRAVENOUS
Status: DISCONTINUED | OUTPATIENT
Start: 2019-01-01 | End: 2019-01-01

## 2019-01-01 RX ORDER — PROPOFOL 10 MG/ML
INJECTION, EMULSION INTRAVENOUS AS NEEDED
Status: DISCONTINUED | OUTPATIENT
Start: 2019-01-01 | End: 2019-01-01 | Stop reason: SURG

## 2019-01-01 RX ORDER — ACETAMINOPHEN 325 MG/1
650 TABLET ORAL EVERY 8 HOURS PRN
Status: DISCONTINUED | OUTPATIENT
Start: 2019-01-01 | End: 2019-01-01

## 2019-01-01 RX ORDER — HYDRALAZINE HYDROCHLORIDE 20 MG/ML
5 INJECTION INTRAMUSCULAR; INTRAVENOUS ONCE
Status: COMPLETED | OUTPATIENT
Start: 2019-01-01 | End: 2019-01-01

## 2019-01-01 RX ORDER — ACETAMINOPHEN 325 MG/1
650 TABLET ORAL EVERY 4 HOURS PRN
Status: DISCONTINUED | OUTPATIENT
Start: 2019-01-01 | End: 2019-01-01 | Stop reason: HOSPADM

## 2019-01-01 RX ORDER — ACETAMINOPHEN 325 MG/1
975 TABLET ORAL ONCE
Status: COMPLETED | OUTPATIENT
Start: 2019-01-01 | End: 2019-01-01

## 2019-01-01 RX ORDER — ROCURONIUM BROMIDE 10 MG/ML
INJECTION, SOLUTION INTRAVENOUS AS NEEDED
Status: DISCONTINUED | OUTPATIENT
Start: 2019-01-01 | End: 2019-01-01 | Stop reason: SURG

## 2019-01-01 RX ORDER — TAMSULOSIN HYDROCHLORIDE 0.4 MG/1
0.4 CAPSULE ORAL
Status: DISCONTINUED | OUTPATIENT
Start: 2019-01-01 | End: 2019-01-01 | Stop reason: HOSPADM

## 2019-01-01 RX ORDER — EPHEDRINE SULFATE 50 MG/ML
INJECTION INTRAVENOUS AS NEEDED
Status: DISCONTINUED | OUTPATIENT
Start: 2019-01-01 | End: 2019-01-01 | Stop reason: SURG

## 2019-01-01 RX ORDER — CLONIDINE HYDROCHLORIDE 0.1 MG/1
0.2 TABLET ORAL ONCE
Status: COMPLETED | OUTPATIENT
Start: 2019-01-01 | End: 2019-01-01

## 2019-01-01 RX ORDER — ALPRAZOLAM 0.25 MG/1
0.25 TABLET ORAL 3 TIMES DAILY PRN
Status: DISCONTINUED | OUTPATIENT
Start: 2019-01-01 | End: 2019-01-01 | Stop reason: HOSPADM

## 2019-01-01 RX ORDER — SODIUM CHLORIDE 9 MG/ML
75 INJECTION, SOLUTION INTRAVENOUS CONTINUOUS
Status: DISCONTINUED | OUTPATIENT
Start: 2019-01-01 | End: 2019-01-01

## 2019-01-01 RX ORDER — SODIUM CHLORIDE, SODIUM GLUCONATE, SODIUM ACETATE, POTASSIUM CHLORIDE, MAGNESIUM CHLORIDE, SODIUM PHOSPHATE, DIBASIC, AND POTASSIUM PHOSPHATE .53; .5; .37; .037; .03; .012; .00082 G/100ML; G/100ML; G/100ML; G/100ML; G/100ML; G/100ML; G/100ML
125 INJECTION, SOLUTION INTRAVENOUS CONTINUOUS
Status: DISCONTINUED | OUTPATIENT
Start: 2019-01-01 | End: 2019-01-01

## 2019-01-01 RX ORDER — ACETAMINOPHEN 650 MG/1
325 SUPPOSITORY RECTAL EVERY 4 HOURS PRN
Status: DISCONTINUED | OUTPATIENT
Start: 2019-01-01 | End: 2019-10-12 | Stop reason: HOSPADM

## 2019-01-01 RX ORDER — HYDROCHLOROTHIAZIDE 12.5 MG/1
12.5 TABLET ORAL DAILY
Status: DISCONTINUED | OUTPATIENT
Start: 2019-01-01 | End: 2019-01-01

## 2019-01-01 RX ORDER — CLOPIDOGREL BISULFATE 75 MG/1
75 TABLET ORAL DAILY
Qty: 30 TABLET | Refills: 5 | Status: SHIPPED | OUTPATIENT
Start: 2019-01-01 | End: 2019-01-01 | Stop reason: SDUPTHER

## 2019-01-01 RX ORDER — MONTELUKAST SODIUM 10 MG/1
TABLET ORAL
Qty: 30 TABLET | Refills: 6 | Status: SHIPPED | OUTPATIENT
Start: 2019-01-01 | End: 2019-01-01 | Stop reason: SDUPTHER

## 2019-01-01 RX ORDER — ESCITALOPRAM OXALATE 10 MG/1
10 TABLET ORAL DAILY
Status: DISCONTINUED | OUTPATIENT
Start: 2019-01-01 | End: 2019-01-01 | Stop reason: HOSPADM

## 2019-01-01 RX ORDER — ASPIRIN 81 MG/1
81 TABLET ORAL DAILY
Status: DISCONTINUED | OUTPATIENT
Start: 2019-01-01 | End: 2019-01-01 | Stop reason: HOSPADM

## 2019-01-01 RX ORDER — PANTOPRAZOLE SODIUM 40 MG/1
40 INJECTION, POWDER, FOR SOLUTION INTRAVENOUS ONCE
Status: COMPLETED | OUTPATIENT
Start: 2019-01-01 | End: 2019-01-01

## 2019-01-01 RX ORDER — BISACODYL 10 MG
10 SUPPOSITORY, RECTAL RECTAL DAILY PRN
Status: DISCONTINUED | OUTPATIENT
Start: 2019-01-01 | End: 2019-10-12 | Stop reason: HOSPADM

## 2019-01-01 RX ORDER — HYDRALAZINE HYDROCHLORIDE 20 MG/ML
5 INJECTION INTRAMUSCULAR; INTRAVENOUS EVERY 6 HOURS PRN
Status: DISCONTINUED | OUTPATIENT
Start: 2019-01-01 | End: 2019-01-01

## 2019-01-01 RX ORDER — ESCITALOPRAM OXALATE 10 MG/1
10 TABLET ORAL DAILY
Qty: 30 TABLET | Refills: 5 | Status: SHIPPED | OUTPATIENT
Start: 2019-01-01

## 2019-01-01 RX ORDER — METOPROLOL SUCCINATE 50 MG/1
50 TABLET, EXTENDED RELEASE ORAL 2 TIMES DAILY
Status: DISCONTINUED | OUTPATIENT
Start: 2019-01-01 | End: 2019-01-01 | Stop reason: HOSPADM

## 2019-01-01 RX ORDER — HYDRALAZINE HYDROCHLORIDE 20 MG/ML
10 INJECTION INTRAMUSCULAR; INTRAVENOUS ONCE
Status: COMPLETED | OUTPATIENT
Start: 2019-01-01 | End: 2019-01-01

## 2019-01-01 RX ORDER — LABETALOL 20 MG/4 ML (5 MG/ML) INTRAVENOUS SYRINGE
10 EVERY 4 HOURS PRN
Status: DISCONTINUED | OUTPATIENT
Start: 2019-01-01 | End: 2019-01-01

## 2019-01-01 RX ORDER — ACETAMINOPHEN 325 MG/1
650 TABLET ORAL EVERY 6 HOURS PRN
Status: DISCONTINUED | OUTPATIENT
Start: 2019-01-01 | End: 2019-01-01

## 2019-01-01 RX ORDER — OXYBUTYNIN CHLORIDE 5 MG/1
5 TABLET, EXTENDED RELEASE ORAL DAILY
Status: DISCONTINUED | OUTPATIENT
Start: 2019-01-01 | End: 2019-01-01 | Stop reason: HOSPADM

## 2019-01-01 RX ORDER — OXYCODONE HYDROCHLORIDE 5 MG/1
5 TABLET ORAL ONCE
Status: COMPLETED | OUTPATIENT
Start: 2019-01-01 | End: 2019-01-01

## 2019-01-01 RX ORDER — LORAZEPAM 2 MG/ML
0.5 INJECTION INTRAMUSCULAR
Status: DISCONTINUED | OUTPATIENT
Start: 2019-01-01 | End: 2019-10-12 | Stop reason: HOSPADM

## 2019-01-01 RX ORDER — METOPROLOL SUCCINATE 50 MG/1
TABLET, EXTENDED RELEASE ORAL
Qty: 180 TABLET | Refills: 2 | Status: SHIPPED | OUTPATIENT
Start: 2019-01-01

## 2019-01-01 RX ORDER — ALPRAZOLAM 0.25 MG/1
0.25 TABLET ORAL 3 TIMES DAILY PRN
Qty: 6 TABLET | Refills: 0 | Status: SHIPPED | OUTPATIENT
Start: 2019-01-01 | End: 2019-01-01

## 2019-01-01 RX ORDER — POTASSIUM CHLORIDE 20MEQ/15ML
40 LIQUID (ML) ORAL ONCE
Status: COMPLETED | OUTPATIENT
Start: 2019-01-01 | End: 2019-01-01

## 2019-01-01 RX ORDER — HYDRALAZINE HYDROCHLORIDE 20 MG/ML
10 INJECTION INTRAMUSCULAR; INTRAVENOUS EVERY 6 HOURS PRN
Status: DISCONTINUED | OUTPATIENT
Start: 2019-01-01 | End: 2019-01-01 | Stop reason: HOSPADM

## 2019-01-01 RX ORDER — PROPOFOL 10 MG/ML
5-50 INJECTION, EMULSION INTRAVENOUS
Status: DISCONTINUED | OUTPATIENT
Start: 2019-01-01 | End: 2019-01-01

## 2019-01-01 RX ORDER — ONDANSETRON 2 MG/ML
4 INJECTION INTRAMUSCULAR; INTRAVENOUS EVERY 6 HOURS PRN
Status: DISCONTINUED | OUTPATIENT
Start: 2019-01-01 | End: 2019-01-01 | Stop reason: HOSPADM

## 2019-01-01 RX ORDER — CHLORHEXIDINE GLUCONATE 0.12 MG/ML
15 RINSE ORAL EVERY 12 HOURS SCHEDULED
Status: DISCONTINUED | OUTPATIENT
Start: 2019-01-01 | End: 2019-01-01

## 2019-01-01 RX ORDER — ATORVASTATIN CALCIUM 40 MG/1
40 TABLET, FILM COATED ORAL EVERY EVENING
Status: DISCONTINUED | OUTPATIENT
Start: 2019-01-01 | End: 2019-01-01

## 2019-01-01 RX ORDER — LISINOPRIL 20 MG/1
TABLET ORAL
Qty: 90 TABLET | Refills: 3 | Status: SHIPPED | OUTPATIENT
Start: 2019-01-01

## 2019-01-01 RX ORDER — SODIUM CHLORIDE 3 G/100ML
250 INJECTION, SOLUTION INTRAVENOUS ONCE
Status: COMPLETED | OUTPATIENT
Start: 2019-01-01 | End: 2019-01-01

## 2019-01-01 RX ORDER — LOPERAMIDE HYDROCHLORIDE 2 MG/1
2 CAPSULE ORAL 4 TIMES DAILY PRN
Status: DISCONTINUED | OUTPATIENT
Start: 2019-01-01 | End: 2019-01-01 | Stop reason: HOSPADM

## 2019-01-01 RX ORDER — NITROGLYCERIN 0.4 MG/1
0.4 TABLET SUBLINGUAL
Qty: 100 TABLET | Refills: 0 | Status: SHIPPED | OUTPATIENT
Start: 2019-01-01 | End: 2019-01-01 | Stop reason: SDUPTHER

## 2019-01-01 RX ORDER — SODIUM CHLORIDE 9 MG/ML
INJECTION, SOLUTION INTRAVENOUS CONTINUOUS PRN
Status: DISCONTINUED | OUTPATIENT
Start: 2019-01-01 | End: 2019-01-01 | Stop reason: SURG

## 2019-01-01 RX ORDER — CLOPIDOGREL BISULFATE 75 MG/1
75 TABLET ORAL DAILY
Status: DISCONTINUED | OUTPATIENT
Start: 2019-01-01 | End: 2019-01-01 | Stop reason: HOSPADM

## 2019-01-01 RX ORDER — ACETAMINOPHEN 160 MG/5ML
650 SUSPENSION, ORAL (FINAL DOSE FORM) ORAL EVERY 6 HOURS PRN
Status: DISCONTINUED | OUTPATIENT
Start: 2019-01-01 | End: 2019-01-01

## 2019-01-01 RX ORDER — CLOPIDOGREL BISULFATE 75 MG/1
75 TABLET ORAL DAILY
Qty: 30 TABLET | Refills: 5 | Status: ON HOLD | OUTPATIENT
Start: 2019-01-01 | End: 2019-01-01 | Stop reason: SDUPTHER

## 2019-01-01 RX ORDER — MONTELUKAST SODIUM 10 MG/1
TABLET ORAL
Qty: 90 TABLET | Refills: 2 | Status: SHIPPED | OUTPATIENT
Start: 2019-01-01

## 2019-01-01 RX ORDER — LISINOPRIL 20 MG/1
20 TABLET ORAL DAILY
Status: DISCONTINUED | OUTPATIENT
Start: 2019-01-01 | End: 2019-01-01 | Stop reason: HOSPADM

## 2019-01-01 RX ORDER — HEPARIN SODIUM 10000 [USP'U]/100ML
3-20 INJECTION, SOLUTION INTRAVENOUS
Status: DISCONTINUED | OUTPATIENT
Start: 2019-01-01 | End: 2019-01-01

## 2019-01-01 RX ORDER — FENTANYL CITRATE 50 UG/ML
INJECTION, SOLUTION INTRAMUSCULAR; INTRAVENOUS AS NEEDED
Status: DISCONTINUED | OUTPATIENT
Start: 2019-01-01 | End: 2019-01-01

## 2019-01-01 RX ADMIN — ROCURONIUM BROMIDE 30 MG: 10 INJECTION, SOLUTION INTRAVENOUS at 10:42

## 2019-01-01 RX ADMIN — PHENYLEPHRINE HYDROCHLORIDE 200 MCG: 10 INJECTION INTRAVENOUS at 10:31

## 2019-01-01 RX ADMIN — INSULIN LISPRO 2 UNITS: 100 INJECTION, SOLUTION INTRAVENOUS; SUBCUTANEOUS at 20:08

## 2019-01-01 RX ADMIN — PHENYLEPHRINE HYDROCHLORIDE 100 MCG/MIN: 10 INJECTION INTRAVENOUS at 09:06

## 2019-01-01 RX ADMIN — PANTOPRAZOLE SODIUM 40 MG: 40 INJECTION, POWDER, FOR SOLUTION INTRAVENOUS at 05:56

## 2019-01-01 RX ADMIN — PHENYLEPHRINE HYDROCHLORIDE 100 MCG: 10 INJECTION INTRAVENOUS at 10:51

## 2019-01-01 RX ADMIN — HYDROCHLOROTHIAZIDE 12.5 MG: 12.5 TABLET ORAL at 09:17

## 2019-01-01 RX ADMIN — PHENYLEPHRINE HYDROCHLORIDE 100 MCG: 10 INJECTION INTRAVENOUS at 10:14

## 2019-01-01 RX ADMIN — ALBUMIN (HUMAN): 12.5 SOLUTION INTRAVENOUS at 11:29

## 2019-01-01 RX ADMIN — SODIUM CHLORIDE 500 ML: 0.9 INJECTION, SOLUTION INTRAVENOUS at 13:00

## 2019-01-01 RX ADMIN — ENOXAPARIN SODIUM 40 MG: 40 INJECTION SUBCUTANEOUS at 09:17

## 2019-01-01 RX ADMIN — PHENYLEPHRINE HYDROCHLORIDE 100 MCG: 10 INJECTION INTRAVENOUS at 10:26

## 2019-01-01 RX ADMIN — FENTANYL CITRATE 25 MCG: 50 INJECTION INTRAMUSCULAR; INTRAVENOUS at 05:22

## 2019-01-01 RX ADMIN — ACETAMINOPHEN 650 MG: 160 SUSPENSION ORAL at 04:14

## 2019-01-01 RX ADMIN — CLOPIDOGREL BISULFATE 75 MG: 75 TABLET ORAL at 08:33

## 2019-01-01 RX ADMIN — HYDROCHLOROTHIAZIDE 12.5 MG: 12.5 TABLET ORAL at 08:33

## 2019-01-01 RX ADMIN — SODIUM CHLORIDE 125 ML/HR: 0.9 INJECTION, SOLUTION INTRAVENOUS at 08:21

## 2019-01-01 RX ADMIN — LABETALOL 20 MG/4 ML (5 MG/ML) INTRAVENOUS SYRINGE 10 MG: at 02:49

## 2019-01-01 RX ADMIN — ATORVASTATIN CALCIUM 40 MG: 40 TABLET, FILM COATED ORAL at 16:13

## 2019-01-01 RX ADMIN — METOPROLOL SUCCINATE 50 MG: 50 TABLET, EXTENDED RELEASE ORAL at 09:17

## 2019-01-01 RX ADMIN — OXYBUTYNIN CHLORIDE 5 MG: 5 TABLET, EXTENDED RELEASE ORAL at 13:22

## 2019-01-01 RX ADMIN — PROPOFOL 25 MCG/KG/MIN: 10 INJECTION, EMULSION INTRAVENOUS at 05:45

## 2019-01-01 RX ADMIN — PROPOFOL 25 MCG/KG/MIN: 10 INJECTION, EMULSION INTRAVENOUS at 21:15

## 2019-01-01 RX ADMIN — Medication 25 MCG/HR: at 16:22

## 2019-01-01 RX ADMIN — GLYCOPYRROLATE 0.2 MG: 0.2 INJECTION, SOLUTION INTRAMUSCULAR; INTRAVENOUS at 16:24

## 2019-01-01 RX ADMIN — PHENYLEPHRINE HYDROCHLORIDE 200 MCG: 10 INJECTION INTRAVENOUS at 09:13

## 2019-01-01 RX ADMIN — SODIUM CHLORIDE: 0.9 INJECTION, SOLUTION INTRAVENOUS at 09:15

## 2019-01-01 RX ADMIN — HYDRALAZINE HYDROCHLORIDE 5 MG: 20 INJECTION INTRAMUSCULAR; INTRAVENOUS at 17:14

## 2019-01-01 RX ADMIN — MONTELUKAST 10 MG: 10 TABLET, FILM COATED ORAL at 08:31

## 2019-01-01 RX ADMIN — HYDRALAZINE HYDROCHLORIDE 10 MG: 20 INJECTION INTRAMUSCULAR; INTRAVENOUS at 04:07

## 2019-01-01 RX ADMIN — LORAZEPAM 0.5 MG: 2 INJECTION INTRAMUSCULAR; INTRAVENOUS at 17:17

## 2019-01-01 RX ADMIN — PHENYLEPHRINE HYDROCHLORIDE 200 MCG: 10 INJECTION INTRAVENOUS at 09:10

## 2019-01-01 RX ADMIN — LABETALOL 20 MG/4 ML (5 MG/ML) INTRAVENOUS SYRINGE 10 MG: at 12:12

## 2019-01-01 RX ADMIN — ACETAMINOPHEN 650 MG: 325 TABLET, FILM COATED ORAL at 22:24

## 2019-01-01 RX ADMIN — PROPOFOL 150 MG: 10 INJECTION, EMULSION INTRAVENOUS at 09:01

## 2019-01-01 RX ADMIN — PHENYLEPHRINE HYDROCHLORIDE 200 MCG: 10 INJECTION INTRAVENOUS at 09:47

## 2019-01-01 RX ADMIN — SODIUM CHLORIDE 2 UNITS/HR: 9 INJECTION, SOLUTION INTRAVENOUS at 22:55

## 2019-01-01 RX ADMIN — SODIUM CHLORIDE: 0.9 INJECTION, SOLUTION INTRAVENOUS at 10:47

## 2019-01-01 RX ADMIN — KETOROLAC TROMETHAMINE 15 MG: 30 INJECTION, SOLUTION INTRAMUSCULAR at 08:55

## 2019-01-01 RX ADMIN — ONDANSETRON 4 MG: 2 INJECTION INTRAMUSCULAR; INTRAVENOUS at 20:27

## 2019-01-01 RX ADMIN — IOHEXOL 85 ML: 350 INJECTION, SOLUTION INTRAVENOUS at 06:27

## 2019-01-01 RX ADMIN — PHENYLEPHRINE HYDROCHLORIDE 100 MCG: 10 INJECTION INTRAVENOUS at 09:58

## 2019-01-01 RX ADMIN — CHLORHEXIDINE GLUCONATE 0.12% ORAL RINSE 15 ML: 1.2 LIQUID ORAL at 08:07

## 2019-01-01 RX ADMIN — METOPROLOL SUCCINATE 50 MG: 50 TABLET, EXTENDED RELEASE ORAL at 17:50

## 2019-01-01 RX ADMIN — MONTELUKAST 10 MG: 10 TABLET, FILM COATED ORAL at 09:16

## 2019-01-01 RX ADMIN — KETOROLAC TROMETHAMINE 15 MG: 30 INJECTION, SOLUTION INTRAMUSCULAR; INTRAVENOUS at 01:45

## 2019-01-01 RX ADMIN — HYDRALAZINE HYDROCHLORIDE 5 MG: 20 INJECTION INTRAMUSCULAR; INTRAVENOUS at 07:27

## 2019-01-01 RX ADMIN — LOPERAMIDE HYDROCHLORIDE 2 MG: 2 CAPSULE ORAL at 17:50

## 2019-01-01 RX ADMIN — HEPARIN SODIUM 590 UNITS/HR: 10000 INJECTION, SOLUTION INTRAVENOUS at 16:57

## 2019-01-01 RX ADMIN — CHLORHEXIDINE GLUCONATE 0.12% ORAL RINSE 15 ML: 1.2 LIQUID ORAL at 20:19

## 2019-01-01 RX ADMIN — CLOPIDOGREL BISULFATE 75 MG: 75 TABLET ORAL at 09:31

## 2019-01-01 RX ADMIN — PHENYLEPHRINE HYDROCHLORIDE 200 MCG: 10 INJECTION INTRAVENOUS at 09:15

## 2019-01-01 RX ADMIN — TAMSULOSIN HYDROCHLORIDE 0.4 MG: 0.4 CAPSULE ORAL at 18:04

## 2019-01-01 RX ADMIN — ACETAMINOPHEN 650 MG: 325 TABLET, FILM COATED ORAL at 15:41

## 2019-01-01 RX ADMIN — SODIUM CHLORIDE 4 UNITS/HR: 9 INJECTION, SOLUTION INTRAVENOUS at 13:31

## 2019-01-01 RX ADMIN — SODIUM CHLORIDE 6 UNITS/HR: 9 INJECTION, SOLUTION INTRAVENOUS at 15:28

## 2019-01-01 RX ADMIN — CHLORHEXIDINE GLUCONATE 0.12% ORAL RINSE 15 ML: 1.2 LIQUID ORAL at 08:30

## 2019-01-01 RX ADMIN — LABETALOL 20 MG/4 ML (5 MG/ML) INTRAVENOUS SYRINGE 10 MG: at 04:56

## 2019-01-01 RX ADMIN — OXYBUTYNIN CHLORIDE 5 MG: 5 TABLET, EXTENDED RELEASE ORAL at 09:31

## 2019-01-01 RX ADMIN — ONDANSETRON 4 MG: 2 INJECTION INTRAMUSCULAR; INTRAVENOUS at 04:56

## 2019-01-01 RX ADMIN — PHENYLEPHRINE HYDROCHLORIDE 100 MCG: 10 INJECTION INTRAVENOUS at 11:02

## 2019-01-01 RX ADMIN — PHENYLEPHRINE HYDROCHLORIDE 20 MCG/MIN: 10 INJECTION INTRAVENOUS at 14:00

## 2019-01-01 RX ADMIN — ENOXAPARIN SODIUM 40 MG: 40 INJECTION SUBCUTANEOUS at 13:22

## 2019-01-01 RX ADMIN — KETOROLAC TROMETHAMINE 15 MG: 30 INJECTION, SOLUTION INTRAMUSCULAR at 12:54

## 2019-01-01 RX ADMIN — METOPROLOL SUCCINATE 50 MG: 50 TABLET, EXTENDED RELEASE ORAL at 18:04

## 2019-01-01 RX ADMIN — PHENYLEPHRINE HYDROCHLORIDE 200 MCG: 10 INJECTION INTRAVENOUS at 10:38

## 2019-01-01 RX ADMIN — ACETAMINOPHEN 650 MG: 325 TABLET, FILM COATED ORAL at 14:29

## 2019-01-01 RX ADMIN — ASPIRIN 81 MG: 81 TABLET, COATED ORAL at 09:31

## 2019-01-01 RX ADMIN — FENTANYL CITRATE 50 MCG: 50 INJECTION INTRAMUSCULAR; INTRAVENOUS at 16:08

## 2019-01-01 RX ADMIN — HYDRALAZINE HYDROCHLORIDE 5 MG: 20 INJECTION INTRAMUSCULAR; INTRAVENOUS at 15:06

## 2019-01-01 RX ADMIN — HALOPERIDOL LACTATE 0.5 MG: 5 INJECTION INTRAMUSCULAR at 17:34

## 2019-01-01 RX ADMIN — LISINOPRIL 20 MG: 20 TABLET ORAL at 13:21

## 2019-01-01 RX ADMIN — LABETALOL 20 MG/4 ML (5 MG/ML) INTRAVENOUS SYRINGE 10 MG: at 19:35

## 2019-01-01 RX ADMIN — METOPROLOL TARTRATE 25 MG: 25 TABLET, FILM COATED ORAL at 20:13

## 2019-01-01 RX ADMIN — ROCURONIUM BROMIDE 50 MG: 10 INJECTION, SOLUTION INTRAVENOUS at 09:01

## 2019-01-01 RX ADMIN — METOPROLOL TARTRATE 50 MG: 50 TABLET, FILM COATED ORAL at 20:16

## 2019-01-01 RX ADMIN — EPHEDRINE SULFATE 10 MG: 50 INJECTION, SOLUTION INTRAVENOUS at 10:14

## 2019-01-01 RX ADMIN — SODIUM CHLORIDE 500 ML: 0.9 INJECTION, SOLUTION INTRAVENOUS at 08:27

## 2019-01-01 RX ADMIN — EPHEDRINE SULFATE 10 MG: 50 INJECTION, SOLUTION INTRAVENOUS at 10:28

## 2019-01-01 RX ADMIN — ACETAMINOPHEN 650 MG: 325 TABLET, FILM COATED ORAL at 21:01

## 2019-01-01 RX ADMIN — SODIUM CHLORIDE 1 UNITS/HR: 9 INJECTION, SOLUTION INTRAVENOUS at 21:24

## 2019-01-01 RX ADMIN — METOPROLOL TARTRATE 50 MG: 50 TABLET, FILM COATED ORAL at 08:41

## 2019-01-01 RX ADMIN — LABETALOL 20 MG/4 ML (5 MG/ML) INTRAVENOUS SYRINGE 10 MG: at 16:56

## 2019-01-01 RX ADMIN — SODIUM CHLORIDE, SODIUM GLUCONATE, SODIUM ACETATE, POTASSIUM CHLORIDE AND MAGNESIUM CHLORIDE 125 ML/HR: 526; 502; 368; 37; 30 INJECTION, SOLUTION INTRAVENOUS at 04:18

## 2019-01-01 RX ADMIN — HEPARIN SODIUM AND DEXTROSE 12 UNITS/KG/HR: 10000; 5 INJECTION INTRAVENOUS at 17:23

## 2019-01-01 RX ADMIN — METOPROLOL SUCCINATE 50 MG: 50 TABLET, EXTENDED RELEASE ORAL at 08:34

## 2019-01-01 RX ADMIN — ESCITALOPRAM OXALATE 10 MG: 10 TABLET ORAL at 09:17

## 2019-01-01 RX ADMIN — ONDANSETRON 4 MG: 2 INJECTION INTRAMUSCULAR; INTRAVENOUS at 01:13

## 2019-01-01 RX ADMIN — SODIUM CHLORIDE 1000 ML: 0.9 INJECTION, SOLUTION INTRAVENOUS at 14:53

## 2019-01-01 RX ADMIN — ATORVASTATIN CALCIUM 40 MG: 40 TABLET, FILM COATED ORAL at 17:41

## 2019-01-01 RX ADMIN — ACETAMINOPHEN 975 MG: 325 TABLET, FILM COATED ORAL at 19:37

## 2019-01-01 RX ADMIN — ENOXAPARIN SODIUM 40 MG: 40 INJECTION SUBCUTANEOUS at 08:31

## 2019-01-01 RX ADMIN — SODIUM CHLORIDE 75 ML/HR: 0.9 INJECTION, SOLUTION INTRAVENOUS at 23:18

## 2019-01-01 RX ADMIN — CHLORHEXIDINE GLUCONATE 0.12% ORAL RINSE 15 ML: 1.2 LIQUID ORAL at 20:10

## 2019-01-01 RX ADMIN — MONTELUKAST 10 MG: 10 TABLET, FILM COATED ORAL at 09:30

## 2019-01-01 RX ADMIN — SODIUM CHLORIDE 4 UNITS/HR: 9 INJECTION, SOLUTION INTRAVENOUS at 13:38

## 2019-01-01 RX ADMIN — DEXAMETHASONE SODIUM PHOSPHATE 10 MG: 10 INJECTION, SOLUTION INTRAMUSCULAR; INTRAVENOUS at 09:01

## 2019-01-01 RX ADMIN — LABETALOL 20 MG/4 ML (5 MG/ML) INTRAVENOUS SYRINGE 10 MG: at 18:41

## 2019-01-01 RX ADMIN — ACETAMINOPHEN 650 MG: 160 SUSPENSION ORAL at 20:16

## 2019-01-01 RX ADMIN — PHENYLEPHRINE HYDROCHLORIDE 100 MCG: 10 INJECTION INTRAVENOUS at 10:05

## 2019-01-01 RX ADMIN — MONTELUKAST 10 MG: 10 TABLET, FILM COATED ORAL at 13:21

## 2019-01-01 RX ADMIN — ASPIRIN 325 MG ORAL TABLET 325 MG: 325 PILL ORAL at 13:21

## 2019-01-01 RX ADMIN — LIDOCAINE HYDROCHLORIDE 50 MG: 10 INJECTION, SOLUTION INFILTRATION; PERINEURAL at 09:01

## 2019-01-01 RX ADMIN — SODIUM CHLORIDE 250 ML: 3 INJECTION, SOLUTION INTRAVENOUS at 13:38

## 2019-01-01 RX ADMIN — ASPIRIN 81 MG: 81 TABLET, COATED ORAL at 09:16

## 2019-01-01 RX ADMIN — METOPROLOL SUCCINATE 50 MG: 50 TABLET, EXTENDED RELEASE ORAL at 18:54

## 2019-01-01 RX ADMIN — PROPOFOL 25 MCG/KG/MIN: 10 INJECTION, EMULSION INTRAVENOUS at 22:16

## 2019-01-01 RX ADMIN — TAMSULOSIN HYDROCHLORIDE 0.4 MG: 0.4 CAPSULE ORAL at 15:40

## 2019-01-01 RX ADMIN — IOHEXOL 100 ML: 350 INJECTION, SOLUTION INTRAVENOUS at 08:18

## 2019-01-01 RX ADMIN — ACETAMINOPHEN 650 MG: 325 TABLET, FILM COATED ORAL at 11:48

## 2019-01-01 RX ADMIN — CHLORHEXIDINE GLUCONATE 0.12% ORAL RINSE 15 ML: 1.2 LIQUID ORAL at 15:55

## 2019-01-01 RX ADMIN — ESCITALOPRAM OXALATE 10 MG: 10 TABLET ORAL at 08:33

## 2019-01-01 RX ADMIN — ACETAMINOPHEN 650 MG: 325 TABLET, FILM COATED ORAL at 11:15

## 2019-01-01 RX ADMIN — FENTANYL CITRATE 50 MCG: 50 INJECTION INTRAMUSCULAR; INTRAVENOUS at 21:56

## 2019-01-01 RX ADMIN — HEPARIN SODIUM 590 UNITS/HR: 10000 INJECTION, SOLUTION INTRAVENOUS at 05:10

## 2019-01-01 RX ADMIN — IODIXANOL 100 ML: 320 INJECTION, SOLUTION INTRAVASCULAR at 07:42

## 2019-01-01 RX ADMIN — FENTANYL CITRATE 50 MCG: 50 INJECTION INTRAMUSCULAR; INTRAVENOUS at 01:22

## 2019-01-01 RX ADMIN — FENTANYL CITRATE 25 MCG: 50 INJECTION, SOLUTION INTRAMUSCULAR; INTRAVENOUS at 17:26

## 2019-01-01 RX ADMIN — LABETALOL 20 MG/4 ML (5 MG/ML) INTRAVENOUS SYRINGE 10 MG: at 01:02

## 2019-01-01 RX ADMIN — OXYBUTYNIN CHLORIDE 5 MG: 5 TABLET, EXTENDED RELEASE ORAL at 09:17

## 2019-01-01 RX ADMIN — PHENYLEPHRINE HYDROCHLORIDE 100 MCG: 10 INJECTION INTRAVENOUS at 10:28

## 2019-01-01 RX ADMIN — ASPIRIN 81 MG: 81 TABLET, COATED ORAL at 08:33

## 2019-01-01 RX ADMIN — METOPROLOL TARTRATE 25 MG: 25 TABLET, FILM COATED ORAL at 08:09

## 2019-01-01 RX ADMIN — IOHEXOL 100 ML: 350 INJECTION, SOLUTION INTRAVENOUS at 23:00

## 2019-01-01 RX ADMIN — TAMSULOSIN HYDROCHLORIDE 0.4 MG: 0.4 CAPSULE ORAL at 16:13

## 2019-01-01 RX ADMIN — METOPROLOL TARTRATE 25 MG: 25 TABLET, FILM COATED ORAL at 12:33

## 2019-01-01 RX ADMIN — SODIUM CHLORIDE 5 MG/HR: 0.9 INJECTION, SOLUTION INTRAVENOUS at 01:57

## 2019-01-01 RX ADMIN — PHENYLEPHRINE HYDROCHLORIDE 100 MCG: 10 INJECTION INTRAVENOUS at 10:56

## 2019-01-01 RX ADMIN — ATORVASTATIN CALCIUM 40 MG: 40 TABLET, FILM COATED ORAL at 17:00

## 2019-01-01 RX ADMIN — ATORVASTATIN CALCIUM 40 MG: 40 TABLET, FILM COATED ORAL at 18:05

## 2019-01-01 RX ADMIN — ESCITALOPRAM OXALATE 10 MG: 10 TABLET ORAL at 13:21

## 2019-01-01 RX ADMIN — ENOXAPARIN SODIUM 40 MG: 40 INJECTION SUBCUTANEOUS at 09:31

## 2019-01-01 RX ADMIN — CHLORHEXIDINE GLUCONATE 0.12% ORAL RINSE 15 ML: 1.2 LIQUID ORAL at 08:41

## 2019-01-01 RX ADMIN — GLYCOPYRROLATE 0.2 MG: 0.2 INJECTION, SOLUTION INTRAMUSCULAR; INTRAVENOUS at 17:26

## 2019-01-01 RX ADMIN — CLOPIDOGREL BISULFATE 75 MG: 75 TABLET ORAL at 13:22

## 2019-01-01 RX ADMIN — METOPROLOL SUCCINATE 50 MG: 50 TABLET, EXTENDED RELEASE ORAL at 09:31

## 2019-01-01 RX ADMIN — ALBUMIN (HUMAN): 12.5 SOLUTION INTRAVENOUS at 11:15

## 2019-01-01 RX ADMIN — SODIUM CHLORIDE 30 ML/HR: 3 INJECTION, SOLUTION INTRAVENOUS at 13:38

## 2019-01-01 RX ADMIN — LABETALOL 20 MG/4 ML (5 MG/ML) INTRAVENOUS SYRINGE 10 MG: at 04:40

## 2019-01-01 RX ADMIN — CLOPIDOGREL BISULFATE 75 MG: 75 TABLET ORAL at 09:16

## 2019-01-01 RX ADMIN — ATORVASTATIN CALCIUM 40 MG: 40 TABLET, FILM COATED ORAL at 18:27

## 2019-01-01 RX ADMIN — LISINOPRIL 20 MG: 20 TABLET ORAL at 09:17

## 2019-01-01 RX ADMIN — CLONIDINE HYDROCHLORIDE 0.2 MG: 0.1 TABLET ORAL at 16:27

## 2019-01-01 RX ADMIN — CHLORHEXIDINE GLUCONATE 0.12% ORAL RINSE 15 ML: 1.2 LIQUID ORAL at 08:09

## 2019-01-01 RX ADMIN — ACETAMINOPHEN 325 MG: 650 SUPPOSITORY RECTAL at 17:08

## 2019-01-01 RX ADMIN — ATORVASTATIN CALCIUM 40 MG: 40 TABLET, FILM COATED ORAL at 17:26

## 2019-01-01 RX ADMIN — CHLORHEXIDINE GLUCONATE 0.12% ORAL RINSE 15 ML: 1.2 LIQUID ORAL at 20:16

## 2019-01-01 RX ADMIN — POTASSIUM CHLORIDE 40 MEQ: 20 SOLUTION ORAL at 12:33

## 2019-01-01 RX ADMIN — ACETAMINOPHEN 650 MG: 325 TABLET, FILM COATED ORAL at 05:39

## 2019-01-01 RX ADMIN — LABETALOL 20 MG/4 ML (5 MG/ML) INTRAVENOUS SYRINGE 10 MG: at 00:21

## 2019-01-01 RX ADMIN — METOPROLOL SUCCINATE 50 MG: 50 TABLET, EXTENDED RELEASE ORAL at 13:21

## 2019-01-01 RX ADMIN — METOPROLOL TARTRATE 50 MG: 50 TABLET, FILM COATED ORAL at 08:30

## 2019-01-01 RX ADMIN — SODIUM CHLORIDE 125 ML/HR: 0.9 INJECTION, SOLUTION INTRAVENOUS at 00:06

## 2019-01-01 RX ADMIN — HEPARIN SODIUM 840 UNITS/HR: 10000 INJECTION, SOLUTION INTRAVENOUS at 19:11

## 2019-01-01 RX ADMIN — PROPOFOL 25 MCG/KG/MIN: 10 INJECTION, EMULSION INTRAVENOUS at 16:16

## 2019-01-01 RX ADMIN — EPHEDRINE SULFATE 10 MG: 50 INJECTION, SOLUTION INTRAVENOUS at 09:15

## 2019-01-01 RX ADMIN — ACETAMINOPHEN 650 MG: 325 TABLET, FILM COATED ORAL at 16:07

## 2019-01-01 RX ADMIN — PHENYLEPHRINE HYDROCHLORIDE 100 MCG: 10 INJECTION INTRAVENOUS at 09:53

## 2019-01-01 RX ADMIN — CHLORHEXIDINE GLUCONATE 0.12% ORAL RINSE 15 ML: 1.2 LIQUID ORAL at 21:25

## 2019-01-01 RX ADMIN — PROPOFOL 40 MCG/KG/MIN: 10 INJECTION, EMULSION INTRAVENOUS at 05:29

## 2019-01-01 RX ADMIN — IODIXANOL 108 ML: 320 INJECTION, SOLUTION INTRAVASCULAR at 12:12

## 2019-01-01 RX ADMIN — EPHEDRINE SULFATE 10 MG: 50 INJECTION, SOLUTION INTRAVENOUS at 09:09

## 2019-01-01 RX ADMIN — PHENYLEPHRINE HYDROCHLORIDE 100 MCG: 10 INJECTION INTRAVENOUS at 11:06

## 2019-01-01 RX ADMIN — METOPROLOL TARTRATE 50 MG: 50 TABLET, FILM COATED ORAL at 21:25

## 2019-01-01 RX ADMIN — ONDANSETRON 4 MG: 2 INJECTION INTRAMUSCULAR; INTRAVENOUS at 01:21

## 2019-01-01 RX ADMIN — LISINOPRIL 20 MG: 20 TABLET ORAL at 09:31

## 2019-01-01 RX ADMIN — ATORVASTATIN CALCIUM 40 MG: 40 TABLET, FILM COATED ORAL at 15:40

## 2019-01-01 RX ADMIN — LISINOPRIL 20 MG: 20 TABLET ORAL at 08:33

## 2019-01-01 RX ADMIN — OXYCODONE HYDROCHLORIDE 5 MG: 5 TABLET ORAL at 20:29

## 2019-01-01 RX ADMIN — ESCITALOPRAM OXALATE 10 MG: 10 TABLET ORAL at 09:30

## 2019-01-01 RX ADMIN — HYDROCHLOROTHIAZIDE 12.5 MG: 12.5 TABLET ORAL at 13:22

## 2019-01-01 RX ADMIN — OXYBUTYNIN CHLORIDE 5 MG: 5 TABLET, EXTENDED RELEASE ORAL at 08:34

## 2019-01-02 NOTE — TELEPHONE ENCOUNTER
Spoke with Narda Levy, patient's daughter  Patient's daughter has questions about the visit on Friday  Patient's daughter is also transporting him so she would like to review everything with a nurse   Please call her back

## 2019-01-02 NOTE — TELEPHONE ENCOUNTER
Pt's daughter Dinh Almendarez called stating pt was just d/c from [facility] 12/29/18 for bleeding in the bladder  Pt needs f/u sooner than February  Spoke with Da Wilson, she will call pt tomorrow after speaking with Reynaldo Madden to see if a sooner appt will be available

## 2019-01-02 NOTE — TELEPHONE ENCOUNTER
Boaz DICKSON calling to clarify voiding trial orders and additional orders needed to reinsert if failed,also asking if office is contacting pt to schedule follow up?    Fax# [phone]

## 2019-01-02 NOTE — TELEPHONE ENCOUNTER
Sent orders to [facility], to remove catheter   Needs a Cystoscopy for hematuria  In [place] follow up in hospital visit

## 2019-01-03 NOTE — TELEPHONE ENCOUNTER
Eula Cheng called back and went over again the appt for Friday  She wants to know what plan is and stated we will set up for a Cysto with Dr Donato Has   We are working on finding an appointment  She had questions about his coumadin and plavix and stated that the cardiolgist needs to reinstate that per note   She understood

## 2019-01-03 NOTE — TELEPHONE ENCOUNTER
Called Minh kern and Bimal explaining that [facility] would be coming in the morning to remove catheter and then he has an appointment with me in the afternoon for a post void residulal and that if he is drinking lost of water and by 1:00 he is to come in earlier

## 2019-01-04 NOTE — PROGRESS NOTES
1/4/2019    Gracie Lopes  1935  [id number]    Diagnosis  Chief Complaint     Urinary Retention          Patient presents for void trial managed by Dr Kit Bear  Follow for Cystoscopy for hematuria    Procedure voiding trial    Cortez catheter removed by [facility] this morning      Patient returned this afternoon  Patient states able to void  Patient voided  while in office  Bladder ultrasound performed and PVR measured 77 71ml  Patient understands if he has difficulties urinating he is to go to ER over the weekend or call our office  He is set up for a Cystoscopy for in the near future      Recent Results (from the past 1 hour(s))   POCT Measure PVR    Collection Time: 01/04/19  3:06 PM   Result Value Ref Range    POST-VOID RESIDUAL VOLUME, ML POC 77 71 mL         Vitals:    01/04/19 1457   BP: 136/63   Pulse: 67   Weight: 76 6 kg (168 lb 12 8 oz)   Height: 5' 5" (1 651 m)           Norberto Bravo RN

## 2019-01-07 NOTE — TELEPHONE ENCOUNTER
Pt was recently hospitalized and d/c'd on 12/29 with gross hematuria possibly r/t radiation cystitis  His plavix and asa had been stopped on admission, and cardiology is to tell him when to resume  He had an indwelling hagen which was removed last week  He has a hospital f/u  with Valencia Abdi, NP this  Thursday  Do you want to advise when to resume meds, or address on Thursday with Valencia Abdi? Also has f/u with you in Feb     Please advise

## 2019-01-07 NOTE — TELEPHONE ENCOUNTER
Patient would like to speak to nurse regarding an issue from his last appointment  Patient had a catheter removed and has questions
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
## 2019-01-08 NOTE — TELEPHONE ENCOUNTER
Patient has an appointment on 1/25/19 for a cysto and wants to move up his appointment for sooner time  Would see any doctor and feels he does not want to wait due to his condition  He has not seen doctor Laurent [place] and would come to [place] office to see any Doctor  This was for a [facility] follow up  He would like to come in today or tomorrow if he can

## 2019-01-09 NOTE — PROGRESS NOTES
Cystoscopy  Date/Time: 1/9/2019 11:55 AM  Performed by: Ishmael Valenzuela  Authorized by: Ishmael Valenzuela     Procedure details: cystoscopy        Written Consent Obtained      Indications for Procedure:   gross hematuria     Physical Exam     Constitutional   General appearance: No acute distress, well appearing and well nourished  Pulmonary   Respiratory effort: No increased work of breathing or signs of respiratory distress  Cardiovascular   Examination of extremities for edema and/or varicosities: Normal     Abdomen   Abdomen: Non-tender, no masses  Liver and spleen: No hepatomegaly or splenomegaly  Genitourinary   Normal phallus and meatus   Musculoskeletal   Gait and station: Normal     Skin   Skin and subcutaneous tissue: Normal without rashes or lesions  Lymphatic   Palpation of lymph nodes in groin: No lymphadenopathy  Additional Exam: Neuro exam nonfocal   The flexible cystoscope was introduced into the urethra and advanced into the bladder  URETHRA:  Normal,  without strictures or lesions  PROSTATE:  Moderate bilobar obstruction  TRIGONE & UOs:   Normal anatomy with efflux of clear urine  There was some dependent old clot on the floor of the bladder  BLADDER MUCOSA:  Hypervascularity indicative of radiation cystitis  DETRUSOR: Normal capacity, without flaccidity, without excessive compliance, without trabeculation or diverticula, without uninhibited bladder contractions on fillings  RETROFLEXED SCOPE VIEW: Normal bladder neck    Plan:  The gross hematuria was likely secondary to radiation cystitis  This is the 1st occurrence of this  We will continue to monitor him and have him follow up in 1 year with PSA testing  He is to contact us sooner if he has any recurrence of blood
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
## 2019-02-01 NOTE — TELEPHONE ENCOUNTER
Can try prescribing Myrbetriq  Would not recommend an anticholinergic due to patient's advanced age  Prescription electronically sent to his [facility] pharmacy  Recommend follow up in 6 weeks to discuss symptoms and efficacy of medication

## 2019-02-01 NOTE — TELEPHONE ENCOUNTER
Called patient and he is having urgency and frequency  He takes Flomax at night  He is wondering if he can take anything else  He  Was in 1/9 for urinary retention- lt doesn't look as if he has tried any other medication

## 2019-02-01 NOTE — TELEPHONE ENCOUNTER
Patient is calling to speak to nurse regarding gross hematuria,  He is doing better but has questions  He has urgency and frequency, wants to know if there is anything he can take  Please advise
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
## 2019-08-16 PROBLEM — F41.1 GAD (GENERALIZED ANXIETY DISORDER): Status: ACTIVE | Noted: 2019-01-01

## 2019-08-16 NOTE — PROGRESS NOTES
Assessment/Plan:       Diagnoses and all orders for this visit:    Near syncope  -     Echo complete with contrast if indicated; Future  -     Holter monitor - 48 hour; Future    Essential hypertension  -     Basic metabolic panel; Future  -     hydrochlorothiazide (HYDRODIURIL) 12 5 mg tablet; Take 1 tablet (12 5 mg total) by mouth daily    SARIKA (generalized anxiety disorder)  -     escitalopram (LEXAPRO) 5 mg tablet; Take 1 tablet (5 mg total) by mouth daily          All of the above diagnoses have been assessed  Additional COMMENTS/PLAN:  See back in 3 weeks with attention to his anxiety symptoms of the Lexapro as well as his blood pressure on the hydrochlorothiazide  Blood work will be done prior to that including a BMP and he will have an echo and Holter monitor  Subjective:      Patient ID: [id number] Song is a 80 y o  male  HPI     Was in the ED 3 days ago  Felt very dizzy for 2 minutes  Had just had lunch  This happened as he walked into home  No palpitations  No N/V  Pt was given alprazolam   Did take a few of them  The following portions of the patient's history were revised and updated as appropriate: Problem list, allergies, med list, FH, SH, Past medical and surgical histories  Current Outpatient Medications   Medication Sig Dispense Refill    atorvastatin (LIPITOR) 40 mg tablet TAKE 1 TABLET BY MOUTH DAILY 90 tablet 3    clopidogrel (PLAVIX) 75 mg tablet Take 1 tablet (75 mg total) by mouth daily 30 tablet 5    lisinopril (ZESTRIL) 20 mg tablet TAKE 1 TABLET BY MOUTH EVERY DAY 90 tablet 3    metoprolol tartrate (LOPRESSOR) 50 mg tablet Take 50 mg by mouth 2 (two) times a day      Mirabegron ER 25 MG TB24 Take 25 mg by mouth daily 30 tablet 11    montelukast (SINGULAIR) 10 mg tablet TAKE 1 TABLET DAILY   90 tablet 2    nitroglycerin (NITROSTAT) 0 4 mg SL tablet DISSOLVE 1 TABLET UNDER THE TONGUE EVERY 5 MINUTES UP TO 3 DOSES AS NEEDED FOR CHEST PAIN 100 tablet 0    tamsulosin (FLOMAX) 0 4 mg Take 1 capsule (0 4 mg total) by mouth daily with dinner 90 capsule 2    ALPRAZolam (XANAX) 0 25 mg tablet Take 1 tablet (0 25 mg total) by mouth 3 (three) times a day as needed for anxiety for up to 10 days (Patient not taking: Reported on 8/16/2019) 6 tablet 0    escitalopram (LEXAPRO) 5 mg tablet Take 1 tablet (5 mg total) by mouth daily 30 tablet 5    hydrochlorothiazide (HYDRODIURIL) 12 5 mg tablet Take 1 tablet (12 5 mg total) by mouth daily 30 tablet 5     No current facility-administered medications for this visit  Review of Systems   Psychiatric/Behavioral:        Anxiety is a problemm   All other systems reviewed and are negative  Objective:    /90 (BP Location: Left arm, Patient Position: Sitting, Cuff Size: Standard)   Pulse 60   Ht 5' 7" (1 702 m)   Wt 74 7 kg (164 lb 9 6 oz)   BMI 25 78 kg/m²    No tilt    BP Readings from Last 3 Encounters:   08/16/19 160/90   08/06/19 135/60   03/27/19 130/60                  Wt Readings from Last 3 Encounters:   08/16/19 74 7 kg (164 lb 9 6 oz)   08/06/19 74 8 kg (165 lb)   03/27/19 76 kg (167 lb 9 6 oz)         Physical Exam   Constitutional: He appears well-developed and well-nourished  Neck: No JVD present  Cardiovascular: Normal rate and regular rhythm  Pulmonary/Chest: Effort normal and breath sounds normal    Abdominal: Soft  Bowel sounds are normal    Musculoskeletal: He exhibits no edema  Vitals reviewed          Admission on 08/06/2019, Discharged on 08/06/2019   Component Date Value Ref Range Status    WBC 08/06/2019 7 80  4 31 - 10 16 Thousand/uL Final    RBC 08/06/2019 4 67  3 88 - 5 62 Million/uL Final    Hemoglobin 08/06/2019 14 4  12 0 - 17 0 g/dL Final    Hematocrit 08/06/2019 41 9  36 5 - 49 3 % Final    MCV 08/06/2019 90  82 - 98 fL Final    MCH 08/06/2019 30 8  26 8 - 34 3 pg Final    MCHC 08/06/2019 34 4  31 4 - 37 4 g/dL Final    RDW 08/06/2019 12 7  11 6 - 15 1 % Final    MPV 08/06/2019 10 4  8 9 - 12 7 fL Final    Platelets 16/32/1303 256  149 - 390 Thousands/uL Final    nRBC 08/06/2019 0  /100 WBCs Final    Neutrophils Relative 08/06/2019 66  43 - 75 % Final    Immat GRANS % 08/06/2019 0  0 - 2 % Final    Lymphocytes Relative 08/06/2019 21  14 - 44 % Final    Monocytes Relative 08/06/2019 8  4 - 12 % Final    Eosinophils Relative 08/06/2019 4  0 - 6 % Final    Basophils Relative 08/06/2019 1  0 - 1 % Final    Neutrophils Absolute 08/06/2019 5 17  1 85 - 7 62 Thousands/µL Final    Immature Grans Absolute 08/06/2019 0 02  0 00 - 0 20 Thousand/uL Final    Lymphocytes Absolute 08/06/2019 1 62  0 60 - 4 47 Thousands/µL Final    Monocytes Absolute 08/06/2019 0 60  0 17 - 1 22 Thousand/µL Final    Eosinophils Absolute 08/06/2019 0 34  0 00 - 0 61 Thousand/µL Final    Basophils Absolute 08/06/2019 0 05  0 00 - 0 10 Thousands/µL Final    Sodium 08/06/2019 139  136 - 145 mmol/L Final    Potassium 08/06/2019 3 6  3 5 - 5 3 mmol/L Final    Chloride 08/06/2019 103  100 - 108 mmol/L Final    CO2 08/06/2019 25  21 - 32 mmol/L Final    ANION GAP 08/06/2019 11  4 - 13 mmol/L Final    BUN 08/06/2019 19  5 - 25 mg/dL Final    Creatinine 08/06/2019 1 21  0 60 - 1 30 mg/dL Final    Standardized to IDMS reference method    Glucose 08/06/2019 131  65 - 140 mg/dL Final      If the patient is fasting, the ADA then defines impaired fasting glucose as > 100 mg/dL and diabetes as > or equal to 123 mg/dL  Specimen collection should occur prior to Sulfasalazine administration due to the potential for falsely depressed results  Specimen collection should occur prior to Sulfapyridine administration due to the potential for falsely elevated results      Calcium 08/06/2019 8 7  8 3 - 10 1 mg/dL Final    eGFR 08/06/2019 55  ml/min/1 73sq m Final    Troponin I 08/06/2019 <0 02  <=0 04 ng/mL Final    3Autovalidation override  [employer] Chemistry analyzer 99% cutoff is > 0 04 ng/mL in network labs     o cTnI 99% cutoff is useful only when applied to patients in the clinical setting of myocardial ischemia   o cTnI 99% cutoff should be interpreted in the context of clinical history, ECG findings and possibly cardiac imaging to establish correct diagnosis  o cTnI 99% cutoff may be suggestive but clearly not indicative of a coronary event without the clinical setting of myocardial ischemia        Color, UA 08/06/2019 Straw   Final    Clarity, UA 08/06/2019 Clear   Final    Specific Gravity, UA 08/06/2019 1 015  1 003 - 1 030 Final    pH, UA 08/06/2019 5 5  4 5, 5 0, 5 5, 6 0, 6 5, 7 0, 7 5, 8 0 Final    Leukocytes, UA 08/06/2019 Trace* Negative Final    Nitrite, UA 08/06/2019 Negative  Negative Final    Protein, UA 08/06/2019 Negative  Negative mg/dl Final    Glucose, UA 08/06/2019 Negative  Negative mg/dl Final    Ketones, UA 08/06/2019 Negative  Negative mg/dl Final    Urobilinogen, UA 08/06/2019 0 2  0 2, 1 0 E U /dl E U /dl Final    Bilirubin, UA 08/06/2019 Negative  Negative Final    Blood, UA 08/06/2019 Negative  Negative Final    Ventricular Rate 08/06/2019 59  BPM Final    Atrial Rate 08/06/2019 59  BPM Final    CT Interval 08/06/2019 182  ms Final    QRSD Interval 08/06/2019 100  ms Final    QT Interval 08/06/2019 446  ms Final    QTC Interval 08/06/2019 441  ms Final    P Axis 08/06/2019 51  degrees Final    QRS Axis 08/06/2019 51  degrees Final    T Wave Farrell 08/06/2019 62  degrees Final    RBC, UA 08/06/2019 0-1* None Seen, 0-5 /hpf Final    WBC, UA 08/06/2019 4-10* None Seen, 0-5, 5-55, 5-65 /hpf Final    Epithelial Cells 08/06/2019 Occasional  None Seen, Occasional /hpf Final    Bacteria, UA 08/06/2019 Occasional  None Seen, Occasional /hpf Final    MUCUS THREADS 08/06/2019 Occasional* None Seen Final         Kashif Canales MD

## 2019-08-16 NOTE — PROGRESS NOTES
BMI Counseling: Body mass index is 25 78 kg/m²  Discussed the patient's BMI with him  The BMI is above average  BMI counseling and education was provided to the patient  Nutrition recommendations include reducing portion sizes

## 2019-08-16 NOTE — PATIENT INSTRUCTIONS
Fill new blood pressure medication prescription and Lexapro for anxiety  Get Holter monitor and echocardiogram  Get blood tests before next appointment in 3 weeks

## 2019-08-27 PROBLEM — R31.9 HEMATURIA: Status: RESOLVED | Noted: 2018-01-01 | Resolved: 2019-01-01
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
## 2019-09-06 PROBLEM — R55 VASOVAGAL ATTACK: Status: ACTIVE | Noted: 2019-01-01

## 2019-09-06 NOTE — PROGRESS NOTES
Assessment/Plan:       Diagnoses and all orders for this visit:    Need for vaccination  -     PNEUMOCOCCAL POLYSACCHARIDE VACCINE 23-VALENT =>1YO SQ IM    Near syncope    Vasovagal attack    SARIKA (generalized anxiety disorder)  -     escitalopram (LEXAPRO) 10 mg tablet; Take 1 tablet (10 mg total) by mouth daily          All of the above diagnoses have been assessed  Additional COMMENTS/PLAN: will see back in 4 weeks with attn to these sxs and his SARIKA  If he continues to have these episodes I still may need to do a 30 day event monitor  Subjective:      Patient ID: Gabriele East is a 80 y o  male  HPI     Had another episode with near syncope  The daughter was with him  He was walking in from the car when he felt lightheaded and he sat down  At no time did he lose consciousness  This lasted about 2 minutes and was promptly followed by diarrhea which seems to been a pattern in previous episodes  Holter was done shortly before that which showed no evidence of significant dysrhythmias but he had none of these symptoms during the time of the Holter being on  Has had loose stools for 3 years  Did see GI  SARIKA-feels that this is better on the lexapro  The following portions of the patient's history were revised and updated as appropriate: Problem list, allergies, med list, FH, SH, Past medical and surgical histories      Current Outpatient Medications   Medication Sig Dispense Refill    ALPRAZolam (XANAX) 0 25 mg tablet Take 1 tablet (0 25 mg total) by mouth 3 (three) times a day as needed for anxiety for up to 10 days 6 tablet 0    atorvastatin (LIPITOR) 40 mg tablet TAKE 1 TABLET BY MOUTH DAILY 90 tablet 3    clopidogrel (PLAVIX) 75 mg tablet Take 1 tablet (75 mg total) by mouth daily 30 tablet 5    escitalopram (LEXAPRO) 10 mg tablet Take 1 tablet (10 mg total) by mouth daily 30 tablet 5    hydrochlorothiazide (HYDRODIURIL) 12 5 mg tablet Take 1 tablet (12 5 mg total) by mouth daily 30 tablet 5    lisinopril (ZESTRIL) 20 mg tablet TAKE 1 TABLET BY MOUTH EVERY DAY 90 tablet 3    metoprolol succinate (TOPROL-XL) 50 mg 24 hr tablet TAKE 1 TABLET TWICE A  tablet 2    Mirabegron ER 25 MG TB24 Take 25 mg by mouth daily 30 tablet 11    montelukast (SINGULAIR) 10 mg tablet TAKE 1 TABLET DAILY  90 tablet 2    nitroglycerin (NITROSTAT) 0 4 mg SL tablet DISSOLVE 1 TABLET UNDER THE TONGUE EVERY 5 MINUTES UP TO 3 DOSES AS NEEDED FOR CHEST PAIN 100 tablet 0    tamsulosin (FLOMAX) 0 4 mg Take 1 capsule (0 4 mg total) by mouth daily with dinner 90 capsule 2     No current facility-administered medications for this visit  Review of Systems   All other systems reviewed and are negative  Objective:    /72 (BP Location: Left arm, Patient Position: Sitting, Cuff Size: Standard)   Pulse 63   Ht 5' 7" (1 702 m)   Wt 73 kg (161 lb)   BMI 25 22 kg/m²      No tilt  BP Readings from Last 3 Encounters:   09/06/19 130/72   08/27/19 138/70   08/16/19 160/90                  Wt Readings from Last 3 Encounters:   09/06/19 73 kg (161 lb)   08/27/19 74 7 kg (164 lb 9 6 oz)   08/16/19 74 7 kg (164 lb 9 6 oz)         Physical Exam   Neck: No JVD present  Cardiovascular: Normal rate and regular rhythm  Pulmonary/Chest: Effort normal and breath sounds normal    Abdominal: Soft  Bowel sounds are normal    Musculoskeletal: He exhibits no edema  Vitals reviewed          Appointment on 09/03/2019   Component Date Value Ref Range Status    Sodium 09/03/2019 140  136 - 145 mmol/L Final    Potassium 09/03/2019 3 9  3 5 - 5 3 mmol/L Final    Chloride 09/03/2019 103  100 - 108 mmol/L Final    CO2 09/03/2019 29  21 - 32 mmol/L Final    ANION GAP 09/03/2019 8  4 - 13 mmol/L Final    BUN 09/03/2019 19  5 - 25 mg/dL Final    Creatinine 09/03/2019 1 13  0 60 - 1 30 mg/dL Final    Standardized to IDMS reference method    Glucose, Fasting 09/03/2019 102* 65 - 99 mg/dL Final      Specimen collection should occur prior to Sulfasalazine administration due to the potential for falsely depressed results  Specimen collection should occur prior to Sulfapyridine administration due to the potential for falsely elevated results      Calcium 09/03/2019 8 4  8 3 - 10 1 mg/dL Final    eGFR 09/03/2019 60  ml/min/1 73sq m Final         Cheyenne Serrano MD

## 2019-09-30 PROBLEM — R77.8 ELEVATED TROPONIN: Status: ACTIVE | Noted: 2019-01-01

## 2019-09-30 PROBLEM — R51.9 HEADACHE: Status: ACTIVE | Noted: 2019-01-01

## 2019-09-30 NOTE — PLAN OF CARE
Problem: PHYSICAL THERAPY ADULT  Goal: Performs mobility at highest level of function for planned discharge setting  See evaluation for individualized goals  Description  Treatment/Interventions: ADL retraining, Functional transfer training, LE strengthening/ROM, Therapeutic exercise, Endurance training, Cognitive reorientation, Patient/family training, Equipment eval/education, Bed mobility, Gait training, Spoke to nursing  Equipment Recommended: Marybel Young       See flowsheet documentation for full assessment, interventions and recommendations  Outcome: Progressing  Note:   Prognosis: Good  Problem List: Decreased strength, Impaired balance, Decreased mobility, Decreased coordination, Decreased safety awareness, Decreased skin integrity, Impaired hearing  Assessment: Pt presents after fall and lay on floor all night with impaired coordination adn strength L ue>Lle  Able to stand with rw and 1 assist Impaired blaance an activity tolernace but no orthostasis  Can benefit form skilled TP serivces to regoan safe funcitonal mobility    May benefit form short term in-pt rehab atd/c  Will follow see goals  Barriers to Discharge: (medical status)     Recommendation: Short-term skilled PT     PT - OK to Discharge: No    See flowsheet documentation for full assessment
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
## 2019-09-30 NOTE — TELEPHONE ENCOUNTER
Dtr Miguel Alfonso called asking to make you aware he is in [facility] again for syncope, and had elevated troponins   [phone]
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
## 2019-09-30 NOTE — TELEPHONE ENCOUNTER
pts daughter Marten Spatz called stating Zenaida Burciaga had syncope episode last night at 11pm  Was found by his son this morning at [facility] and was taken to [facility] said he was admitted she said he had abnormal blood work related to heart is what Marten Spatz said     This is just an fyi for Dr David Rosado
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
## 2019-10-01 PROBLEM — I63.9 CVA (CEREBRAL VASCULAR ACCIDENT) (HCC): Status: ACTIVE | Noted: 2019-01-01

## 2019-10-01 NOTE — PLAN OF CARE
Problem: Potential for Falls  Goal: Patient will remain free of falls  Description  INTERVENTIONS:  - Assess patient frequently for physical needs  -  Identify cognitive and physical deficits and behaviors that affect risk of falls    -  [place] fall precautions as indicated by assessment   - Educate patient/family on patient safety including physical limitations  - Instruct patient to call for assistance with activity based on assessment  - Modify environment to reduce risk of injury  - Consider OT/PT consult to assist with strengthening/mobility  10/1/2019 0806 by Celso Hadley RN  Outcome: Progressing  10/1/2019 0805 by Celso Hadley RN  Outcome: Progressing     Problem: Prexisting or High Potential for Compromised Skin Integrity  Goal: Skin integrity is maintained or improved  Description  INTERVENTIONS:  - Identify patients at risk for skin breakdown  - Assess and monitor skin integrity  - Assess and monitor nutrition and hydration status  - Monitor labs   - Assess for incontinence   - Turn and reposition patient  - Assist with mobility/ambulation  - Relieve pressure over bony prominences  - Avoid friction and shearing  - Provide appropriate hygiene as needed including keeping skin clean and dry  - Evaluate need for skin moisturizer/barrier cream  - Collaborate with interdisciplinary team   - Patient/family teaching  - Consider wound care consult   10/1/2019 0806 by Celso Hadley RN  Outcome: Progressing  10/1/2019 0805 by Celso Hadley RN  Outcome: Progressing     Problem: SAFETY ADULT  Goal: Maintain or return to baseline ADL function  Description  INTERVENTIONS:  -  Assess patient's ability to carry out ADLs; assess patient's baseline for ADL function and identify physical deficits which impact ability to perform ADLs (bathing, care of mouth/teeth, toileting, grooming, dressing, etc )  - Assess/evaluate cause of self-care deficits   - Assess range of motion  - Assess patient's mobility; develop plan if impaired  - Assess patient's need for assistive devices and provide as appropriate  - Encourage maximum independence but intervene and supervise when necessary  - Involve family in performance of ADLs  - Assess for home care needs following discharge   - Consider OT consult to assist with ADL evaluation and planning for discharge  - Provide patient education as appropriate  10/1/2019 0806 by Marly Clinton RN  Outcome: Progressing  10/1/2019 0805 by Marly Clinton RN  Outcome: Progressing  Goal: Maintain or return mobility status to optimal level  Description  INTERVENTIONS:  - Assess patient's baseline mobility status (ambulation, transfers, stairs, etc )    - Identify cognitive and physical deficits and behaviors that affect mobility  - Identify mobility aids required to assist with transfers and/or ambulation (gait belt, sit-to-stand, lift, walker, cane, etc )  - [place] fall precautions as indicated by assessment  - Record patient progress and toleration of activity level on Mobility SBAR; progress patient to next Phase/Stage  - Instruct patient to call for assistance with activity based on assessment  - Consider rehabilitation consult to assist with strengthening/weightbearing, etc   10/1/2019 0806 by Marly Clinton RN  Outcome: Progressing  10/1/2019 0805 by Marly Clinton RN  Outcome: Progressing     Problem: DISCHARGE PLANNING  Goal: Discharge to home or other facility with appropriate resources  Description  INTERVENTIONS:  - Identify barriers to discharge w/patient and caregiver  - Arrange for needed discharge resources and transportation as appropriate  - Identify discharge learning needs (meds, wound care, etc )  - Arrange for interpretive services to assist at discharge as needed  - Refer to Case Management Department for coordinating discharge planning if the patient needs post-hospital services based on physician/advanced practitioner order or complex needs related to functional status, cognitive ability, or social support system  10/1/2019 0806 by Delfino Banuelos RN  Outcome: Progressing  10/1/2019 0805 by Delfino Banuelos RN  Outcome: Progressing     Problem: Knowledge Deficit  Goal: Patient/family/caregiver demonstrates understanding of disease process, treatment plan, medications, and discharge instructions  Description  Complete learning assessment and assess knowledge base    Interventions:  - Provide teaching at level of understanding  - Provide teaching via preferred learning methods  10/1/2019 0806 by Delfino Banuelos RN  Outcome: Progressing  10/1/2019 0805 by Delfino Banuelos RN  Outcome: Progressing
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
## 2019-10-01 NOTE — PLAN OF CARE
Problem: PHYSICAL THERAPY ADULT  Goal: Performs mobility at highest level of function for planned discharge setting  See evaluation for individualized goals  Description  Treatment/Interventions: ADL retraining, Functional transfer training, LE strengthening/ROM, Therapeutic exercise, Endurance training, Cognitive reorientation, Patient/family training, Equipment eval/education, Bed mobility, Gait training, Spoke to nursing  Equipment Recommended: Antonio Chilel       See flowsheet documentation for full assessment, interventions and recommendations  Outcome: Progressing  Note:   Prognosis: Good  Problem List: Decreased strength, Decreased coordination, Decreased mobility, Impaired balance, Decreased endurance  Assessment: Pt continues with L sided weakness and incoordination  MRI + for cva  Recommend short term in-pt rehab at d/c   Barriers to Discharge: (medical status)     Recommendation: Short-term skilled PT     PT - OK to Discharge: Yes    See flowsheet documentation for full assessment

## 2019-10-01 NOTE — QUICK NOTE
I spoke to our radiologist regarding the MRI's, showed left cerebellar infarct - likely vertebrobasilar pathology - CTA head and neck pending  MR Cervical spine shows evidence of myelopathy  Both contributing to balance impairment  Strokes likely subacute  Blood pressures to be maintained below 140 mm Hg  Continue Plavix 75 mg daily, with patient having had the stroke while on Plavix, would add Aspirin to the regimen  For headaches, may try Tramadol 50 mg every 6 hours PRN

## 2019-10-01 NOTE — PROGRESS NOTES
Progress Note - Niraj Cali 80 y o  male MRN: [id number]  Unit/Bed#: [address] [place] [address] [id number] Encounter: [id number]    Assessment:  Principal Problem:    Syncope  Active Problems:    Essential hypertension    Pure hypercholesterolemia    Depression    Headache    Elevated troponin    [facility]  Resolved Problems:    * No resolved hospital problems  *      Plan:  · Dizziness, gait imbalance and episodes of loss of control of limbs-acute to subacute left cerebellar and right occipital and right thalamus ischemia/infarct(CVA)  Neurology follow-up noted  Monitor on telemetry  Echocardiogram on September 3rd revealed ejection fraction 55% with severe hypokinesis of basal mid inferior and basal inferior lateral wall  Continue on aspirin, Plavix and statin  Lipid profile and hemoglobin A1c noted  As per Neurology continue dual antiplatelet therapy for 21 days and then switched to aspirin alone  PT/OT/ST  Outpatient follow-up with Stroke Clinic within 4-6 weeks  · Cervical stenosis C3-C4 with potential mild myelopathy  Neurosurgery evaluation appreciated  Fall precautions  Outpatient follow-up for cervical stenosis with Neurosurgery at [facility]   No contraindication to anticoagulation as per neurosurgeon  · Elevated troponin likely non MI related  · History of CAD, hypertension  Cardiology consult appreciated  Continue on aspirin, Plavix, Toprol-XL, Zestril, statin  · Depression-on Lexapro  Denies any suicidal or homicidal ideation  · Hyperlipidemia-on statin  · DVT prophylaxis  · Discussed with patient and daughter in detail    Subjective:   Patient is seen and examined at bedside  Continues to complain of bifrontal headaches with associated photophobia  Also reports of left upper extremity clumsiness when eating or drinking using his left hand  Feels ataxic upon standing  Denies any other complaint  Afebrile  All other ROS are negative      Objective:   Vitals: Blood pressure 161/67, pulse 58, temperature 98 °F (36 7 °C), temperature source Oral, resp  rate 20, height 5' 7" (1 702 m), weight 73 kg (160 lb 15 oz), SpO2 96 %  ,Body mass index is 25 21 kg/m²  SPO2 RA Rest      ED to Hosp-Admission (Current) from 9/30/2019 in [facility] S[facility]   SpO2  96 %   SpO2 Activity  At Rest   O2 Device  None (Room air)   O2 Flow Rate          I&O:     Intake/Output Summary (Last 24 hours) at 10/1/2019 1539  Last data filed at 9/30/2019 2350  Gross per 24 hour   Intake    Output 300 ml   Net -300 ml       Physical Exam:    General- Alert, lying comfortably in bed  Not in any acute distress  HEENT- LEXI, EOM intact  Neck- Supple, No JVD  CVS- regular, S1 and S2 normal   Chest- Bilateral Air entry, No rhochi, crackles or wheezing present  Abdomen- soft, nontender, not distended, no guarding or rigidity, BS+  Extremities-  No pedal edema, No calf tenderness  + Graham's left upper extremity  CNS-   Alert, awake and orientedx3    No motor or sensory deficits    Invasive Devices     Peripheral Intravenous Line            Peripheral IV 09/30/19 Left Antecubital 1 day                      Social History  reviewed  Family History   Problem Relation Age of Onset    Substance Abuse Neg Hx     Mental illness Neg Hx     reviewed    Meds:  Current Facility-Administered Medications   Medication Dose Route Frequency Provider Last Rate Last Dose    acetaminophen (TYLENOL) tablet 650 mg  650 mg Oral Q4H PRN Josue Soulier, MD        ALPRAZolam Racheal Grit) tablet 0 25 mg  0 25 mg Oral TID PRN Josue Soulier, MD        aspirin (ECOTRIN LOW STRENGTH) EC tablet 81 mg  81 mg Oral Daily CHAD Bills   81 mg at 10/01/19 0916    atorvastatin (LIPITOR) tablet 40 mg  40 mg Oral Daily With Hari Bell MD   40 mg at 09/30/19 1613    clopidogrel (PLAVIX) tablet 75 mg  75 mg Oral Daily Josue Soulier, MD   75 mg at 10/01/19 0916    enoxaparin (LOVENOX) subcutaneous injection 40 mg  40 mg Subcutaneous Daily Cuco Evangelista MD   40 mg at 10/01/19 0917    escitalopram (LEXAPRO) tablet 10 mg  10 mg Oral Daily Cuco Evangelista MD   10 mg at 10/01/19 0917    hydrALAZINE (APRESOLINE) injection 10 mg  10 mg Intravenous Q6H PRN Cuco Evangelista MD        hydrochlorothiazide (HYDRODIURIL) tablet 12 5 mg  12 5 mg Oral Daily Cuco Evangelista MD   12 5 mg at 10/01/19 0917    ketorolac (TORADOL) injection 15 mg  15 mg Intravenous Q6H PRN Cuco Evangelista MD        lisinopril (ZESTRIL) tablet 20 mg  20 mg Oral Daily Cuco Evangelista MD   20 mg at 10/01/19 0207    loperamide (IMODIUM) capsule 2 mg  2 mg Oral 4x Daily PRN Cuoc Evangelista MD   2 mg at 09/30/19 1750    metoprolol succinate (TOPROL-XL) 24 hr tablet 50 mg  50 mg Oral BID Cuco Evangelista MD   50 mg at 10/01/19 0917    montelukast (SINGULAIR) tablet 10 mg  10 mg Oral Daily Cuco Evangelista MD   10 mg at 10/01/19 0916    nitroglycerin (NITROSTAT) SL tablet 0 4 mg  0 4 mg Sublingual Q5 Min PRN Cuco Evangelista MD        ondansetron (ZOFRAN) injection 4 mg  4 mg Intravenous Q6H PRN Cuco Evangelista MD        oxybutynin (DITROPAN-XL) 24 hr tablet 5 mg  5 mg Oral Daily Cuco Evangelista MD   5 mg at 10/01/19 0917    tamsulosin (FLOMAX) capsule 0 4 mg  0 4 mg Oral Daily With Richelle Nicolas MD   0 4 mg at 09/30/19 1613      Medications Prior to Admission   Medication    ALPRAZolam (XANAX) 0 25 mg tablet    atorvastatin (LIPITOR) 40 mg tablet    clopidogrel (PLAVIX) 75 mg tablet    escitalopram (LEXAPRO) 10 mg tablet    hydrochlorothiazide (HYDRODIURIL) 12 5 mg tablet    lisinopril (ZESTRIL) 20 mg tablet    metoprolol succinate (TOPROL-XL) 50 mg 24 hr tablet    Mirabegron ER 25 MG TB24    montelukast (SINGULAIR) 10 mg tablet    nitroglycerin (NITROSTAT) 0 4 mg SL tablet    tamsulosin (FLOMAX) 0 4 mg       Labs:  Results from last 7 days   Lab Units 10/01/19  0458 09/30/19  0825   WBC Thousand/uL 9 34 10 91* HEMOGLOBIN g/dL 12 6 12 8   HEMATOCRIT % 38 0 38 7   PLATELETS Thousands/uL 199 229   NEUTROS PCT % 74 83*   LYMPHS PCT % 13* 9*   MONOS PCT % 9 7   EOS PCT % 4 1     Results from last 7 days   Lab Units 10/01/19  0458 09/30/19  0825   POTASSIUM mmol/L 4 1 3 8   CHLORIDE mmol/L 103 104   CO2 mmol/L 29 27   BUN mg/dL 18 24   CREATININE mg/dL 1 02 1 20   CALCIUM mg/dL 8 8 9 3   ALK PHOS U/L 76  --    ALT U/L 70  --    AST U/L 33  --      Lab Results   Component Value Date    TROPONINI 0 11 (H) 09/30/2019    TROPONINI 0 09 (H) 09/30/2019    TROPONINI 0 11 (H) 09/30/2019    TROPONINI <0 04 04/20/2014    TROPONINI <0 04 04/19/2014    TROPONINI <0 04 04/19/2014    CKMB 2 1 09/30/2019    CKTOTAL 236 09/30/2019    CKTOTAL 28 (L) 04/20/2014    CKTOTAL 26 (L) 04/19/2014    CKTOTAL 40 04/19/2014         Lab Results   Component Value Date    URINECX No Growth <1000 cfu/mL 12/27/2018    URINECX >100,000 cfu/ml Escherichia coli (A) 09/17/2018         Imaging:  Results for orders placed during the hospital encounter of 09/30/19   XR chest 1 view portable    Narrative CHEST     INDICATION:   Weakness, presyncope  COMPARISON:  8/10/2015    EXAM PERFORMED/VIEWS:  XR CHEST PORTABLE      FINDINGS:    Heart size is normal   Aortic tortuosity and atherosclerosis  Normal pulmonary vasculature  The lungs are clear  No pneumothorax or pleural effusion  Osteopenia  No acute osseous findings  Impression No acute cardiopulmonary disease  Workstation performed: [id number]       No results found for this or any previous visit  Labs & Imaging: I have personally reviewed pertinent reports        VTE Pharmacologic Prophylaxis: Enoxaparin (Lovenox)  VTE Mechanical Prophylaxis: sequential compression device    Code Status:   Level 1 - Full Code      "This note has been constructed using a voice recognition system"      Aida Manuel MD  10/1/2019,3:39 PM
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
## 2019-10-01 NOTE — PLAN OF CARE
Problem: Potential for Falls  Goal: Patient will remain free of falls  Description  INTERVENTIONS:  - Assess patient frequently for physical needs  -  Identify cognitive and physical deficits and behaviors that affect risk of falls    -  [place] fall precautions as indicated by assessment   - Educate patient/family on patient safety including physical limitations  - Instruct patient to call for assistance with activity based on assessment  - Modify environment to reduce risk of injury  - Consider OT/PT consult to assist with strengthening/mobility  Outcome: Progressing     Problem: Prexisting or High Potential for Compromised Skin Integrity  Goal: Skin integrity is maintained or improved  Description  INTERVENTIONS:  - Identify patients at risk for skin breakdown  - Assess and monitor skin integrity  - Assess and monitor nutrition and hydration status  - Monitor labs   - Assess for incontinence   - Turn and reposition patient  - Assist with mobility/ambulation  - Relieve pressure over bony prominences  - Avoid friction and shearing  - Provide appropriate hygiene as needed including keeping skin clean and dry  - Evaluate need for skin moisturizer/barrier cream  - Collaborate with interdisciplinary team   - Patient/family teaching  - Consider wound care consult   Outcome: Progressing     Problem: SAFETY ADULT  Goal: Maintain or return to baseline ADL function  Description  INTERVENTIONS:  -  Assess patient's ability to carry out ADLs; assess patient's baseline for ADL function and identify physical deficits which impact ability to perform ADLs (bathing, care of mouth/teeth, toileting, grooming, dressing, etc )  - Assess/evaluate cause of self-care deficits   - Assess range of motion  - Assess patient's mobility; develop plan if impaired  - Assess patient's need for assistive devices and provide as appropriate  - Encourage maximum independence but intervene and supervise when necessary  - Involve family in performance of ADLs  - Assess for home care needs following discharge   - Consider OT consult to assist with ADL evaluation and planning for discharge  - Provide patient education as appropriate  Outcome: Progressing  Goal: Maintain or return mobility status to optimal level  Description  INTERVENTIONS:  - Assess patient's baseline mobility status (ambulation, transfers, stairs, etc )    - Identify cognitive and physical deficits and behaviors that affect mobility  - Identify mobility aids required to assist with transfers and/or ambulation (gait belt, sit-to-stand, lift, walker, cane, etc )  - [place] fall precautions as indicated by assessment  - Record patient progress and toleration of activity level on Mobility SBAR; progress patient to next Phase/Stage  - Instruct patient to call for assistance with activity based on assessment  - Consider rehabilitation consult to assist with strengthening/weightbearing, etc   Outcome: Progressing     Problem: DISCHARGE PLANNING  Goal: Discharge to home or other facility with appropriate resources  Description  INTERVENTIONS:  - Identify barriers to discharge w/patient and caregiver  - Arrange for needed discharge resources and transportation as appropriate  - Identify discharge learning needs (meds, wound care, etc )  - Arrange for interpretive services to assist at discharge as needed  - Refer to Case Management Department for coordinating discharge planning if the patient needs post-hospital services based on physician/advanced practitioner order or complex needs related to functional status, cognitive ability, or social support system  Outcome: Progressing     Problem: Knowledge Deficit  Goal: Patient/family/caregiver demonstrates understanding of disease process, treatment plan, medications, and discharge instructions  Description  Complete learning assessment and assess knowledge base    Interventions:  - Provide teaching at level of understanding  - Provide teaching via preferred learning methods  Outcome: Progressing

## 2019-10-01 NOTE — PLAN OF CARE
Problem: OCCUPATIONAL THERAPY ADULT  Goal: Performs self-care activities at highest level of function for planned discharge setting  See evaluation for individualized goals  Outcome: Progressing  Note:   Limitation: Decreased ADL status, Decreased UE strength, Decreased Safe judgement during ADL, Decreased cognition, Decreased endurance, Decreased sensation, Decreased fine motor control, Decreased self-care trans, Decreased high-level ADLs  Prognosis: Guarded  Assessment: Pt is a 80 y o  male seen for OT evaluation at [facility], admitted 9/30/2019 w/ Syncope  OT completed extensive review of pt's medical and social history  Comorbidities affecting pt's functional performance at time of assessment include: New CVA, HTN, depression, headache, chronic pain, cardiovascular disease, hx prostate cancer, back pain, cervical cord compression (C3-4)  Personal factors affecting pt at time of IE include:difficulty performing ADLS and health management   Prior to admission, pt was living in apartment attached to [facility] home and was independent with ADL and ambulating without a device  Upon evaluation, pt presents to OT below baseline due to the following performance deficits: weakness, decreased strength, decreased balance, decreased tolerance, impaired GMC, impaired [address], impaired sensation, impaired memory, impaired sequencing, impaired problem solving, impulsivity and decreased safety awareness  Pt presents with L sided weakness, which he is currently aware of  Full ADL session performed in addition to evaluation; see treatment note  Pt to benefit from continued skilled OT tx while in the hospital to address deficits as defined above and maximize level of functional independence w ADL's and functional mobility  Occupational Performance areas to address include: grooming, bathing/shower, toilet hygiene, dressing and functional mobility  Based on findings, pt is of high complexity   At this time, OT recommendations at time of discharge are short term rehab       OT Discharge Recommendation: Short Term Rehab  OT - OK to Discharge: Yes(to STR when medically stable)

## 2019-10-01 NOTE — PROGRESS NOTES
Progress Note - Neurology   [facility] 80 y o  male MRN: [id number]  Unit/Bed#: [address] [place] [address] Encounter: [id number]        Assessment/Plan :  79 yo male with syncope and vascular risk factors including  HTN, HLD, CAD s/p stenting (2006) and prostate cancer who presented with dizziness, gait imbalance and episodes of loss of control of limbs  MRI Brain wo contrast per report demonstrating "Left cerebellar and right occipital and right thalamus ischemia/infarct, acute to subacute"  Today, patient continues with LUE/LLE ataxia and left sided frontal headache  Plan:  - Cardiology following; appreciate recommendations  - reviewed 9/03/19 Echocardiogram: LVEF 55% with severe hypokinesis of the basal-mid inferior and basal inferolateral wall, moderated annular calcification, trace MR and AR  - continue secondary stroke prevention   - continue DAPT: ASA 81 mg and Plavix 75 mg    - continue Lipitor 40 mg   - recommend SBP goal 120-140 mmHg   - recommend euglycemia; HgA1c 6 2  - PT/OT/SL following  - monitor neuro exam and notify with changes  - recommend Neurosurgery consult to review current MRI cervical spine demonstrating cord compression and stenosis  Patient reports he followed with Neurosurgery at [facility] in the past for cervical stenosis and he refused surgery  - requested follow up appointment with [facility] within 4-6 weeks  Results reviewed:  - labs: LDL 34, HDL 42, Triglycerides 99  - 9/30/19 CTA head and neck: per report: Subacute infarct at the right occipital lobe and superior aspect of the left cerebellum  Moderate microvascular ischemic disease  Extensive atherosclerotic disease throughout the bilateral V4 segments with focal severe stenosis at the mid right V4  No intracranial aneurysm    Linear filling defect at the posterior basilar tip 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   - 9/30/19 MRI Brain wo contrast: per report: Left cerebellar and right occipital and right thalamus ischemia/infarct, acute to subacute  Left cerebellar hemosiderin staining/petechial hemorrhage in an area of suspected old infarct    - 19 MRI cervical spine wo contrast: per report: Diffuse cord atrophy  Cord compression C3-C4 with gliosis/edema  Fusion of the vertebral bodies at the C4-C5 and C6-C7 levels  Recent left cerebellar infarct          Subjective:   Patient continues with bifrontal (headache left> right) with associated photophobia  He also reports LUE clumsiness when eating and drinking using his left hand  Upon standing he feels off balance  He has no pain, visual changes, numbness/tingling/weakness of face or extremities, SOB, chest pain, palpitations, nausea or dyspepsia  12 Review of Systems - conducted and negative except as noted in subjective    NIH Stroke Scale      Interval: 24 hours post onset of symptoms +/- 20 minutes  Time: 0935 am  Person Administering Scale: CHAD [place]      1a  Level of consciousness: 0=alert; keenly responsive   1b  LOC questions:  0=Performs both tasks correctly   1c  LOC commands: 0=Performs both tasks correctly   2  Best Gaze: 0=normal   3  Visual: 0=No visual loss   4  Facial Palsy: 0=Normal symmetric movement   5a  Motor left arm: 0=No drift, limb holds 90 (or 45) degrees for full 10 seconds   5b  Motor right arm: 0=No drift, limb holds 90 (or 45) degrees for full 10 seconds   6a  motor left le=No drift, limb holds 90 (or 45) degrees for full 10 seconds   6b  Motor right le=No drift, limb holds 90 (or 45) degrees for full 10 seconds   7  Limb Ataxia: 2=Present in two limbs   8  Sensory: 0=Normal; no sensory loss   9  Best Language:  0=No aphasia, normal   10  Dysarthria: 0=Normal articulation   11  Extinction and Inattention: 0=No abnormality         Total:   2       Vitals: Blood pressure 155/68, pulse (!) 51, temperature 98 3 °F (36 8 °C), temperature source Oral, resp   rate 18, height 5' 7" (1 702 m), weight 73 kg (160 lb 15 oz), SpO2 98 %  ,Body mass index is 25 21 kg/m²  MEDS:    Current Facility-Administered Medications:  acetaminophen 650 mg Oral Q6H PRN Jayleen Mcnally PA-C   ALPRAZolam 0 25 mg Oral TID PRN Vinicio Huntley MD   atorvastatin 40 mg Oral Daily With Leticia Dhillon MD   clopidogrel 75 mg Oral Daily Vinicio Huntley MD   enoxaparin 40 mg Subcutaneous Daily Vinicio Huntley MD   escitalopram 10 mg Oral Daily Vinicio Huntley MD   hydrALAZINE 10 mg Intravenous Q6H PRN Vinicio Huntley MD   hydrochlorothiazide 12 5 mg Oral Daily Vinicio Huntley MD   lisinopril 20 mg Oral Daily Vinicio Huntley MD   loperamide 2 mg Oral 4x Daily PRN Vinicio Huntley MD   metoprolol succinate 50 mg Oral BID Vinicio Huntley MD   montelukast 10 mg Oral Daily Vinicio Huntley MD   nitroglycerin 0 4 mg Sublingual Q5 Min PRN Vinicio Huntley MD   ondansetron 4 mg Intravenous Q6H PRN Vinicio Huntley MD   oxybutynin 5 mg Oral Daily Vinicio Huntley MD   tamsulosin 0 4 mg Oral Daily With Leticia Dhillon MD     Physical Exam   Constitutional: He is oriented to person, place, and time  He appears well-developed and well-nourished  No distress  HENT:   Head: Normocephalic and atraumatic  Mouth/Throat: Oropharynx is clear and moist    Eyes: Pupils are equal, round, and reactive to light  Conjunctivae and EOM are normal    Neck: Normal range of motion  Cardiovascular: Normal rate, regular rhythm and normal heart sounds  Exam reveals no gallop and no friction rub  No murmur heard  Pulmonary/Chest: Effort normal and breath sounds normal  No respiratory distress  He has no wheezes  He has no rales  He exhibits no tenderness  Abdominal: Soft  Bowel sounds are normal  He exhibits no distension  There is no tenderness  Musculoskeletal: Normal range of motion  He exhibits no edema  Neurological: He is alert and oriented to person, place, and time  He has normal strength   He has an abnormal Finger-Nose-Finger Test (dysmetria LUE) and an abnormal Heel to [employer] (LLE)  Reflex Scores:       Bicep reflexes are 2+ on the right side and 2+ on the left side  Brachioradialis reflexes are 2+ on the right side and 2+ on the left side  Patellar reflexes are 2+ on the right side and 2+ on the left side  Skin: Skin is warm and dry  He is not diaphoretic  Psychiatric: His speech is normal    Vitals reviewed  Neurologic Exam     Mental Status   Oriented to person, place, and time  Oriented to person (name and )  Oriented to place  [facility] )  Oriented to time  Oriented to year, month, date and day  Follows 3 step commands  Attention: normal  Concentration: normal    Speech: speech is normal   Knowledge: good  Able to perform simple calculations  Able to name object  Able to read  Able to repeat  Normal comprehension  Cranial Nerves     CN II   Visual fields full to confrontation  CN III, IV, VI   Pupils are equal, round, and reactive to light  Extraocular motions are normal    Right pupil: Size: 2 mm  Shape: regular  Left pupil: Size: 2 mm  Shape: regular  Nystagmus: none   Diplopia: none  CN V-XII intact     Motor Exam   Muscle bulk: normal  Right arm pronator drift: absent  Left arm pronator drift: absent    Strength   Strength 5/5 throughout  Sensory Exam   Light touch normal      Gait, Coordination, and Reflexes     Coordination   Finger to nose coordination: abnormal (dysmetria LUE)  Heel to shin coordination: abnormal (LLE)    Reflexes   Right brachioradialis: 2+  Left brachioradialis: 2+  Right biceps: 2+  Left biceps: 2+  Right patellar: 2+  Left patellar: 2+      Lab Results:   I have personally reviewed pertinent reports      CBC:   Results from last 7 days   Lab Units 10/01/19  0458 19  0825   WBC Thousand/uL 9 34 10 91*   RBC Million/uL 4 18 4 28   HEMOGLOBIN g/dL 12 6 12 8   HEMATOCRIT % 38 0 38 7   MCV fL 91 90   PLATELETS Thousands/uL 199 229 BMP/CMP:   Results from last 7 days   Lab Units 10/01/19  0458 09/30/19  0825   SODIUM mmol/L 139 142   POTASSIUM mmol/L 4 1 3 8   CHLORIDE mmol/L 103 104   CO2 mmol/L 29 27   BUN mg/dL 18 24   CREATININE mg/dL 1 02 1 20   CALCIUM mg/dL 8 8 9 3   AST U/L 33  --    ALT U/L 70  --    ALK PHOS U/L 76  --    EGFR ml/min/1 73sq m 68 56    Lipid Profile:   Results from last 7 days   Lab Units 10/01/19  0458   HDL mg/dL 42   LDL CALC mg/dL 39   TRIGLYCERIDES mg/dL 99     Imaging Studies: I have personally reviewed pertinent reports  and I have personally reviewed pertinent films in PACS     EEG, Pathology, and Other Studies: I have personally reviewed pertinent reports  and I have personally reviewed pertinent films in PACS    VTE Prophylaxis: Sequential compression device (Venodyne)  and Enoxaparin (Lovenox)     Counseling / Coordination of Care  Total time spent today 50 minutes  Greater than 50% of total time was spent with the patient and / or family counseling and / or coordination of care  A description of the counseling / coordination of care: discussion of diagnostic findings with patient and son at bedside   Also discussed plane of care including  medication changes, need for PT/OT evaluation and plan for follow up and ongoing management with [facility]roke [facility]

## 2019-10-01 NOTE — PHYSICAL THERAPY NOTE
PT tx     10/01/19 1135   Pain Assessment   Pain Assessment 0-10   Pain Score 5   Pain Location Head   Restrictions/Precautions   Weight Bearing Precautions Per Order No   Other Precautions Bed Alarm; Chair Alarm;Cognitive; Fall Risk;Telemetry   General   Chart Reviewed Yes   Additional Pertinent History MRI = acute cva and some cervical cord compression   Cognition   Overall Cognitive Status Impaired   Arousal/Participation Alert; Responsive   Attention Attends with cues to redirect   Orientation Level Oriented X4   Memory Decreased short term memory   Following Commands Follows one step commands with increased time or repetition   Subjective   Subjective aggrees to tx and get washed up   Bed Mobility   Supine to Sit 4  Minimal assistance   Additional items Assist x 1;HOB elevated; Bedrails; Impulsive;Verbal cues   Transfers   Sit to Stand 4  Minimal assistance   Additional items Assist x 1   Stand to Sit 4  Minimal assistance   Additional items Assist x 1   Stand pivot 4  Minimal assistance   Additional items Assist x 2;Verbal cues; Increased time required  (rw)   Ambulation/Elevation   Gait pattern Improper Weight shift;Decreased foot clearance; Inconsistent treva; Short stride; Step to  (difficulty adv L foot leans to R)   Gait Assistance 4  Minimal assist   Additional items Assist x 2   Assistive Device Rolling walker   Distance 5'   Balance   Static Sitting Fair   Dynamic Sitting Fair -   Static Standing Fair -   Dynamic Standing Fair -   Ambulatory Fair -   Endurance Deficit   Endurance Deficit Yes   Endurance Deficit Description tires quickly   Activity Tolerance   Activity Tolerance Patient tolerated treatment well   Medical Staff Made Aware OT Cuong   Assessment   Prognosis Good   Problem List Decreased strength;Decreased coordination;Decreased mobility; Impaired balance;Decreased endurance   Assessment Pt continues with L sided weakness and incoordination  MRI + for cva   Recommend short term in-pt rehab at d/c    Goals   Patient Goals get better   Plan   Treatment/Interventions ADL retraining;Functional transfer training;LE strengthening/ROM; Therapeutic exercise; Endurance training;Cognitive reorientation;Patient/family training;Equipment eval/education; Bed mobility;Gait training;Spoke to nursing;Spoke to case management;OT   Progress Progressing toward goals   PT Frequency 5x/wk   Recommendation   Recommendation Short-term skilled PT   Equipment Recommended Walker   PT - OK to Discharge Yes   Additional Comments   (to sTR)   Gabino Morgan, PT

## 2019-10-01 NOTE — SOCIAL WORK
LOS: 1  Patient is not a 30 day re admission or a Medicare Bundled patient  Met with patient and reviewed the discharge planning process including identifying help at home and patient preference for discharge planning  Patient resides in a one level in law suite at his son's home  There are 2 DANA  Patient is independent of ADL's and uses either a RW or SPC as needed  He prepares his own meals  He obtains his meds at [facility] in [place] and can afford them  His son, Rick Norton is his help at home  He has been followed by [facility] in the past  He has received SNF rehab at [facility]  He reports no history of BHU or D&A rehab admission  He has a POA but does not have an Advanced Directive, he is not interested in obtaining information on AD  Discussed patient's need for SNF rehab and he is agreeable  Freedom of Choice given and he would  like rehab close to his home in [facility] above with his son Rick Norton at [phone], and he too agrees that patient can benefit from SNF rehab  He request list of SNF close to his home  List was e-mailed to him  Wait family decision

## 2019-10-02 NOTE — PLAN OF CARE
Problem: PHYSICAL THERAPY ADULT  Goal: Performs mobility at highest level of function for planned discharge setting  See evaluation for individualized goals  Description  Treatment/Interventions: ADL retraining, Functional transfer training, LE strengthening/ROM, Therapeutic exercise, Endurance training, Cognitive reorientation, Patient/family training, Equipment eval/education, Bed mobility, Gait training, Spoke to nursing  Equipment Recommended: Mat Prader       See flowsheet documentation for full assessment, interventions and recommendations  Outcome: Progressing  Note:   Prognosis: Good  Problem List: Decreased strength, Decreased endurance, Impaired balance, Decreased mobility, Decreased coordination, Impaired vision  Assessment: Pt continues with L extremty incoordination ue>le, impaired trnasfers balance and giat  Able to take few steps with rw adn 2 assist with cues and assist to wt shift  Conitnue to recommend in-pt rhab atd/c In chair with alarm activated after session Needs in reach  Barriers to Discharge: (medical status)     Recommendation: Short-term skilled PT     PT - OK to Discharge: Yes    See flowsheet documentation for full assessment

## 2019-10-02 NOTE — PHYSICAL THERAPY NOTE
PT tx     10/02/19 1225   Pain Assessment   Pain Assessment No/denies pain   Pain Score No Pain   Restrictions/Precautions   Weight Bearing Precautions Per Order No   Other Precautions Fall Risk;Visual impairment   General   Chart Reviewed Yes   Additional Pertinent History looking for rehab facility to d/c to   Cognition   Overall Cognitive Status [facility]   Arousal/Participation Alert   Attention Attends with cues to redirect   Orientation Level Oriented X4   Memory Within functional limits   Following Commands Follows one step commands with increased time or repetition   Subjective   Subjective feels ok ate breakfast in bed   Bed Mobility   Supine to Sit 4  Minimal assistance   Additional items Assist x 1   Sit to Supine 4  Minimal assistance   Additional items Assist x 1   Transfers   Sit to Stand 4  Minimal assistance   Additional items Assist x 2   Stand to Sit 4  Minimal assistance   Additional items Assist x 1   Stand pivot 4  Minimal assistance   Additional items Assist x 2  (rw)   Ambulation/Elevation   Gait pattern Improper Weight shift;Decreased foot clearance; Forward Flexion; Inconsistent treva; Short stride; Step to   Gait Assistance 4  Minimal assist   Additional items Assist x 2   Assistive Device Rolling walker   Distance 6'   Balance   Static Sitting Fair   Dynamic Sitting Fair -   Static Standing Fair -   Dynamic Standing Poor +   Ambulatory Poor +   Endurance Deficit   Endurance Deficit Yes   Endurance Deficit Description needs fequent rests   Activity Tolerance   Activity Tolerance Patient tolerated treatment well   Nurse Made Aware yes   Exercises   Knee AROM Long Arc Quad Sitting;AROM; Left;10 reps   UE Exercise 10 reps; Sitting;AAROM; Left  (reach for target sitting with L ue)   Balance training  stood x 1 min with rw adn min Ax1 for postural correction leans to R   Assessment   Prognosis Good   Problem List Decreased strength;Decreased endurance; Impaired balance;Decreased mobility; Decreased coordination; Impaired vision   Assessment Pt continues with L extremty incoordination ue>le, impaired trnasfers balance and giat  Able to take few steps with rw adn 2 assist with cues and assist to wt shift  Conitnue to recommend in-pt rhab atd/c In chair with alarm activated after session Needs in reach   Barriers to Discharge   (medical status)   Goals   Patient Goals get better   Plan   Treatment/Interventions ADL retraining;Functional transfer training;LE strengthening/ROM; Elevations; Therapeutic exercise; Endurance training;Cognitive reorientation;Patient/family training;Equipment eval/education; Bed mobility;Gait training;Spoke to nursing;OT   Progress Progressing toward goals   PT Frequency 5x/wk   Recommendation   Recommendation Short-term skilled PT   Equipment Recommended Walker   PT - OK to Discharge Yes   Additional Comments   (to STR with medical clearance)   Curtis Murphy, PT

## 2019-10-02 NOTE — SOCIAL WORK
Continuing to follow patient  Spoke with patient's daughter, Jose Rubin at [phone]  Discussed patient's need for SNF and she is agreeable  List of local SNF provided and she will be touring and making a decision

## 2019-10-02 NOTE — PLAN OF CARE
Problem: Potential for Falls  Goal: Patient will remain free of falls  Description  INTERVENTIONS:  - Assess patient frequently for physical needs  -  Identify cognitive and physical deficits and behaviors that affect risk of falls    -  [place] fall precautions as indicated by assessment   - Educate patient/family on patient safety including physical limitations  - Instruct patient to call for assistance with activity based on assessment  - Modify environment to reduce risk of injury  - Consider OT/PT consult to assist with strengthening/mobility  Outcome: Progressing     Problem: Prexisting or High Potential for Compromised Skin Integrity  Goal: Skin integrity is maintained or improved  Description  INTERVENTIONS:  - Identify patients at risk for skin breakdown  - Assess and monitor skin integrity  - Assess and monitor nutrition and hydration status  - Monitor labs   - Assess for incontinence   - Turn and reposition patient  - Assist with mobility/ambulation  - Relieve pressure over bony prominences  - Avoid friction and shearing  - Provide appropriate hygiene as needed including keeping skin clean and dry  - Evaluate need for skin moisturizer/barrier cream  - Collaborate with interdisciplinary team   - Patient/family teaching  - Consider wound care consult   Outcome: Progressing     Problem: SAFETY ADULT  Goal: Maintain or return to baseline ADL function  Description  INTERVENTIONS:  -  Assess patient's ability to carry out ADLs; assess patient's baseline for ADL function and identify physical deficits which impact ability to perform ADLs (bathing, care of mouth/teeth, toileting, grooming, dressing, etc )  - Assess/evaluate cause of self-care deficits   - Assess range of motion  - Assess patient's mobility; develop plan if impaired  - Assess patient's need for assistive devices and provide as appropriate  - Encourage maximum independence but intervene and supervise when necessary  - Involve family in performance of ADLs  - Assess for home care needs following discharge   - Consider OT consult to assist with ADL evaluation and planning for discharge  - Provide patient education as appropriate  Outcome: Progressing  Goal: Maintain or return mobility status to optimal level  Description  INTERVENTIONS:  - Assess patient's baseline mobility status (ambulation, transfers, stairs, etc )    - Identify cognitive and physical deficits and behaviors that affect mobility  - Identify mobility aids required to assist with transfers and/or ambulation (gait belt, sit-to-stand, lift, walker, cane, etc )  - [place] fall precautions as indicated by assessment  - Record patient progress and toleration of activity level on Mobility SBAR; progress patient to next Phase/Stage  - Instruct patient to call for assistance with activity based on assessment  - Consider rehabilitation consult to assist with strengthening/weightbearing, etc   Outcome: Progressing     Problem: DISCHARGE PLANNING  Goal: Discharge to home or other facility with appropriate resources  Description  INTERVENTIONS:  - Identify barriers to discharge w/patient and caregiver  - Arrange for needed discharge resources and transportation as appropriate  - Identify discharge learning needs (meds, wound care, etc )  - Arrange for interpretive services to assist at discharge as needed  - Refer to Case Management Department for coordinating discharge planning if the patient needs post-hospital services based on physician/advanced practitioner order or complex needs related to functional status, cognitive ability, or social support system  Outcome: Progressing     Problem: Knowledge Deficit  Goal: Patient/family/caregiver demonstrates understanding of disease process, treatment plan, medications, and discharge instructions  Description  Complete learning assessment and assess knowledge base    Interventions:  - Provide teaching at level of understanding  - Provide teaching via preferred learning methods  Outcome: Progressing     Problem: Neurological Deficit  Goal: Neurological status is stable or improving  Description  Interventions:  - Monitor and assess patient's level of consciousness, motor function, sensory function, and level of assistance needed for ADLs  - Monitor and report changes from baseline  Collaborate with interdisciplinary team to initiate plan and implement interventions as ordered  - Provide and maintain a safe environment  - Consider seizure precautions  - Consider fall precautions  - Consider aspiration precautions  - Consider bleeding precautions  Outcome: Progressing     Problem: Activity Intolerance/Impaired Mobility  Goal: Mobility/activity is maintained at optimum level for patient  Description  Interventions:  - Assess and monitor patient  barriers to mobility and need for assistive/adaptive devices  - Assess patient's emotional response to limitations  - Collaborate with interdisciplinary team and initiate plans and interventions as ordered  - Encourage independent activity per ability   - Maintain proper body alignment  - Perform active/passive rom as tolerated/ordered    - Plan activities to conserve energy   - Turn patient as appropriate  Outcome: Progressing

## 2019-10-02 NOTE — PROGRESS NOTES
Progress Note - Krystal Wray 80 y o  male MRN: [id number]  Unit/Bed#: [address] [place], [place] [address] Encounter: [id number]    Assessment:  Principal Problem:    Syncope  Active Problems:    Essential hypertension    Pure hypercholesterolemia    Depression    Headache    Elevated troponin    CVA (cerebral vascular accident) ([facility] )  Resolved Problems:    * No resolved hospital problems  *      Plan:  · Dizziness, gait imbalance and episodes of loss of control of limbs-acute to subacute left cerebellar and right occipital and right thalamus ischemia/infarct(CVA)  Neurology on board  Echocardiogram on September 3rd revealed ejection fraction 55% with severe hypokinesis of basal mid inferior and basal inferior lateral wall  Continue on aspirin, Plavix and statin  Lipid profile and hemoglobin A1c noted  Will order procalcitonin chest x-ray  As per Neurology continue dual antiplatelet therapy for 21 days and then switched to aspirin alone  PT/OT/ST  Outpatient follow-up with Stroke Clinic within 4-6 weeks  · Cervical stenosis C3-C4 with potential mild myelopathy  Neurosurgery evaluation appreciated  Fall precautions  Outpatient follow-up for cervical stenosis with Neurosurgery at [facility]   No contraindication to anticoagulation as per neurosurgeon  · Elevated troponin likely non MI related  · History of CAD, hypertension  Cardiology on board  Continue on aspirin, Plavix, Toprol-XL, Zestril, statin  · Depression-on Lexapro  Denies any suicidal or homicidal ideation  · Hyperlipidemia-on statin  · DVT prophylaxis  · Discussed with patient and daughter in detail    Subjective:   Patient is seen and examined at bedside  Patient had episode of nausea and vomiting overnight  Denies any nausea, shortness of breath, chest pain, abdominal pain  Afebrile  All other ROS are negative  Objective:   Vitals: Blood pressure 123/67, pulse 59, temperature 98 6 °F (37 °C), temperature source Oral, resp   rate 16, height 5' 7" (1 702 m), weight 73 kg (160 lb 15 oz), SpO2 97 %  ,Body mass index is 25 21 kg/m²  SPO2 RA Rest      ED to Hosp-Admission (Current) from 9/30/2019 in [facility] Surg Unit   SpO2  97 %   SpO2 Activity  At Rest   O2 Device  None (Room air)   O2 Flow Rate          I&O:     Intake/Output Summary (Last 24 hours) at 10/2/2019 1033  Last data filed at 10/1/2019 1700  Gross per 24 hour   Intake    Output 150 ml   Net -150 ml       Physical Exam:    General- Alert, lying comfortably in bed  Not in any acute distress  HEENT- LEXI, EOM intact  Neck- Supple, No JVD  CVS- regular, S1 and S2 normal   Chest- Bilateral Air entry, No rhochi, crackles or wheezing present  Abdomen- soft, nontender, not distended, no guarding or rigidity, BS+  Extremities-  No pedal edema, No calf tenderness                         Normal ROM in all extremities  CNS-   Alert, awake and orientedx3  No focal deficits present      Invasive Devices     Peripheral Intravenous Line            Peripheral IV 09/30/19 Left Antecubital 2 days                      Social History  reviewed  Family History   Problem Relation Age of Onset    Substance Abuse Neg Hx     Mental illness Neg Hx     reviewed    Meds:  Current Facility-Administered Medications   Medication Dose Route Frequency Provider Last Rate Last Dose    acetaminophen (TYLENOL) tablet 650 mg  650 mg Oral Q4H PRN Victorina Reynaga MD   650 mg at 10/01/19 1541    ALPRAZolam (XANAX) tablet 0 25 mg  0 25 mg Oral TID PRN Victorina Reynaga MD        aspirin (ECOTRIN LOW STRENGTH) EC tablet 81 mg  81 mg Oral Daily CHAD Yu   81 mg at 10/02/19 0827    atorvastatin (LIPITOR) tablet 40 mg  40 mg Oral Daily With Hoa Mojica MD   40 mg at 10/01/19 1540    clopidogrel (PLAVIX) tablet 75 mg  75 mg Oral Daily Victorina Reynaga MD   75 mg at 10/02/19 0833    enoxaparin (LOVENOX) subcutaneous injection 40 mg  40 mg Subcutaneous Daily Victorina Reynaga MD   40 mg at 10/02/19 0831    escitalopram (LEXAPRO) tablet 10 mg  10 mg Oral Daily Estefany Molina MD   10 mg at 10/02/19 0061    hydrALAZINE (APRESOLINE) injection 10 mg  10 mg Intravenous Q6H PRN Estefany Molina MD        lisinopril (ZESTRIL) tablet 20 mg  20 mg Oral Daily Estefany Molina MD   20 mg at 10/02/19 2848    loperamide (IMODIUM) capsule 2 mg  2 mg Oral 4x Daily PRN Estefany Molina MD   2 mg at 09/30/19 1750    metoprolol succinate (TOPROL-XL) 24 hr tablet 50 mg  50 mg Oral BID Estefany Molina MD   50 mg at 10/02/19 0834    montelukast (SINGULAIR) tablet 10 mg  10 mg Oral Daily Estefany Molina MD   10 mg at 10/02/19 0831    nitroglycerin (NITROSTAT) SL tablet 0 4 mg  0 4 mg Sublingual Q5 Min PRN Estefany Molina MD        ondansetron [facility]) injection 4 mg  4 mg Intravenous Q6H PRN Estefany Molina MD   4 mg at 10/02/19 0113    oxybutynin (DITROPAN-XL) 24 hr tablet 5 mg  5 mg Oral Daily Estefany Molina MD   5 mg at 10/02/19 0834    tamsulosin (FLOMAX) capsule 0 4 mg  0 4 mg Oral Daily With Sarah Shaw MD   0 4 mg at 10/01/19 1540      Medications Prior to Admission   Medication    ALPRAZolam (XANAX) 0 25 mg tablet    atorvastatin (LIPITOR) 40 mg tablet    clopidogrel (PLAVIX) 75 mg tablet    escitalopram (LEXAPRO) 10 mg tablet    hydrochlorothiazide (HYDRODIURIL) 12 5 mg tablet    lisinopril (ZESTRIL) 20 mg tablet    metoprolol succinate (TOPROL-XL) 50 mg 24 hr tablet    Mirabegron ER 25 MG TB24    montelukast (SINGULAIR) 10 mg tablet    nitroglycerin (NITROSTAT) 0 4 mg SL tablet    tamsulosin (FLOMAX) 0 4 mg       Labs:  Results from last 7 days   Lab Units 10/02/19  0630 10/01/19  0458 09/30/19  0825   WBC Thousand/uL 8 87 9 34 10 91*   HEMOGLOBIN g/dL 12 5 12 6 12 8   HEMATOCRIT % 37 0 38 0 38 7   PLATELETS Thousands/uL 231 199 229   NEUTROS PCT % 71 74 83*   LYMPHS PCT % 16 13* 9*   MONOS PCT % 10 9 7   EOS PCT % 3 4 1     Results from last 7 days   Lab Units 10/02/19  0630 10/01/19  0458 09/30/19  0825   POTASSIUM mmol/L 4 1 4 1 3 8   CHLORIDE mmol/L 100 103 104   CO2 mmol/L 30 29 27   BUN mg/dL 19 18 24   CREATININE mg/dL 1 30 1 02 1 20   CALCIUM mg/dL 8 9 8 8 9 3   ALK PHOS U/L  --  76  --    ALT U/L  --  70  --    AST U/L  --  33  --      Lab Results   Component Value Date    TROPONINI 0 11 (H) 09/30/2019    TROPONINI 0 09 (H) 09/30/2019    TROPONINI 0 11 (H) 09/30/2019    TROPONINI <0 04 04/20/2014    TROPONINI <0 04 04/19/2014    TROPONINI <0 04 04/19/2014    CKMB 2 1 09/30/2019    CKTOTAL 236 09/30/2019    CKTOTAL 28 (L) 04/20/2014    CKTOTAL 26 (L) 04/19/2014    CKTOTAL 40 04/19/2014         Lab Results   Component Value Date    URINECX No Growth <1000 cfu/mL 12/27/2018    URINECX >100,000 cfu/ml Escherichia coli (A) 09/17/2018         Imaging:  Results for orders placed during the hospital encounter of 09/30/19   XR chest 1 view portable    Narrative CHEST     INDICATION:   Weakness, presyncope  COMPARISON:  8/10/2015    EXAM PERFORMED/VIEWS:  XR CHEST PORTABLE      FINDINGS:    Heart size is normal   Aortic tortuosity and atherosclerosis  Normal pulmonary vasculature  The lungs are clear  No pneumothorax or pleural effusion  Osteopenia  No acute osseous findings  Impression No acute cardiopulmonary disease  Workstation performed: [id number]       No results found for this or any previous visit  Labs & Imaging: I have personally reviewed pertinent reports        VTE Pharmacologic Prophylaxis: Enoxaparin (Lovenox)  VTE Mechanical Prophylaxis: sequential compression device    Code Status:   Level 1 - Full Code      "This note has been constructed using a voice recognition system"      Cherlele Landon MD  10/2/2019,10:33 AM

## 2019-10-03 PROBLEM — N17.9 ACUTE KIDNEY INJURY (HCC): Status: ACTIVE | Noted: 2019-01-01

## 2019-10-03 PROBLEM — R55 SYNCOPE: Status: RESOLVED | Noted: 2019-01-01 | Resolved: 2019-01-01

## 2019-10-03 PROBLEM — R77.8 ELEVATED TROPONIN: Status: RESOLVED | Noted: 2019-01-01 | Resolved: 2019-01-01

## 2019-10-03 NOTE — SOCIAL WORK
Patient for discharge today  Spoke with Apolinar Sever, patient's daughter and the family has decided on rehab at [facility]  They will transport him there at 1:45 today  Notified Aiyana Bran of [facility] and Lianna Adame, patient's nurse of transport time

## 2019-10-03 NOTE — PLAN OF CARE
Problem: Potential for Falls  Goal: Patient will remain free of falls  Description  INTERVENTIONS:  - Assess patient frequently for physical needs  -  Identify cognitive and physical deficits and behaviors that affect risk of falls    -  [place] fall precautions as indicated by assessment   - Educate patient/family on patient safety including physical limitations  - Instruct patient to call for assistance with activity based on assessment  - Modify environment to reduce risk of injury  - Consider OT/PT consult to assist with strengthening/mobility  Outcome: Progressing     Problem: Prexisting or High Potential for Compromised Skin Integrity  Goal: Skin integrity is maintained or improved  Description  INTERVENTIONS:  - Identify patients at risk for skin breakdown  - Assess and monitor skin integrity  - Assess and monitor nutrition and hydration status  - Monitor labs   - Assess for incontinence   - Turn and reposition patient  - Assist with mobility/ambulation  - Relieve pressure over bony prominences  - Avoid friction and shearing  - Provide appropriate hygiene as needed including keeping skin clean and dry  - Evaluate need for skin moisturizer/barrier cream  - Collaborate with interdisciplinary team   - Patient/family teaching  - Consider wound care consult   Outcome: Progressing     Problem: SAFETY ADULT  Goal: Maintain or return to baseline ADL function  Description  INTERVENTIONS:  -  Assess patient's ability to carry out ADLs; assess patient's baseline for ADL function and identify physical deficits which impact ability to perform ADLs (bathing, care of mouth/teeth, toileting, grooming, dressing, etc )  - Assess/evaluate cause of self-care deficits   - Assess range of motion  - Assess patient's mobility; develop plan if impaired  - Assess patient's need for assistive devices and provide as appropriate  - Encourage maximum independence but intervene and supervise when necessary  - Involve family in performance of ADLs  - Assess for home care needs following discharge   - Consider OT consult to assist with ADL evaluation and planning for discharge  - Provide patient education as appropriate  Outcome: Progressing  Goal: Maintain or return mobility status to optimal level  Description  INTERVENTIONS:  - Assess patient's baseline mobility status (ambulation, transfers, stairs, etc )    - Identify cognitive and physical deficits and behaviors that affect mobility  - Identify mobility aids required to assist with transfers and/or ambulation (gait belt, sit-to-stand, lift, walker, cane, etc )  - [place] fall precautions as indicated by assessment  - Record patient progress and toleration of activity level on Mobility SBAR; progress patient to next Phase/Stage  - Instruct patient to call for assistance with activity based on assessment  - Consider rehabilitation consult to assist with strengthening/weightbearing, etc   Outcome: Progressing     Problem: DISCHARGE PLANNING  Goal: Discharge to home or other facility with appropriate resources  Description  INTERVENTIONS:  - Identify barriers to discharge w/patient and caregiver  - Arrange for needed discharge resources and transportation as appropriate  - Identify discharge learning needs (meds, wound care, etc )  - Arrange for interpretive services to assist at discharge as needed  - Refer to Case Management Department for coordinating discharge planning if the patient needs post-hospital services based on physician/advanced practitioner order or complex needs related to functional status, cognitive ability, or social support system  Outcome: Progressing     Problem: Knowledge Deficit  Goal: Patient/family/caregiver demonstrates understanding of disease process, treatment plan, medications, and discharge instructions  Description  Complete learning assessment and assess knowledge base    Interventions:  - Provide teaching at level of understanding  - Provide teaching via preferred learning methods  Outcome: Progressing     Problem: Neurological Deficit  Goal: Neurological status is stable or improving  Description  Interventions:  - Monitor and assess patient's level of consciousness, motor function, sensory function, and level of assistance needed for ADLs  - Monitor and report changes from baseline  Collaborate with interdisciplinary team to initiate plan and implement interventions as ordered  - Provide and maintain a safe environment  - Consider seizure precautions  - Consider fall precautions  - Consider aspiration precautions  - Consider bleeding precautions  Outcome: Progressing     Problem: Activity Intolerance/Impaired Mobility  Goal: Mobility/activity is maintained at optimum level for patient  Description  Interventions:  - Assess and monitor patient  barriers to mobility and need for assistive/adaptive devices  - Assess patient's emotional response to limitations  - Collaborate with interdisciplinary team and initiate plans and interventions as ordered  - Encourage independent activity per ability   - Maintain proper body alignment  - Perform active/passive rom as tolerated/ordered    - Plan activities to conserve energy   - Turn patient as appropriate  Outcome: Progressing

## 2019-10-03 NOTE — PLAN OF CARE
Problem: Potential for Falls  Goal: Patient will remain free of falls  Description  INTERVENTIONS:  - Assess patient frequently for physical needs  -  Identify cognitive and physical deficits and behaviors that affect risk of falls    -  [place] fall precautions as indicated by assessment   - Educate patient/family on patient safety including physical limitations  - Instruct patient to call for assistance with activity based on assessment  - Modify environment to reduce risk of injury  - Consider OT/PT consult to assist with strengthening/mobility  10/3/2019 1323 by Starr Cm RN  Outcome: Adequate for Discharge  10/3/2019 1046 by Starr Cm RN  Outcome: Progressing     Problem: Prexisting or High Potential for Compromised Skin Integrity  Goal: Skin integrity is maintained or improved  Description  INTERVENTIONS:  - Identify patients at risk for skin breakdown  - Assess and monitor skin integrity  - Assess and monitor nutrition and hydration status  - Monitor labs   - Assess for incontinence   - Turn and reposition patient  - Assist with mobility/ambulation  - Relieve pressure over bony prominences  - Avoid friction and shearing  - Provide appropriate hygiene as needed including keeping skin clean and dry  - Evaluate need for skin moisturizer/barrier cream  - Collaborate with interdisciplinary team   - Patient/family teaching  - Consider wound care consult   10/3/2019 1323 by Starr Cm RN  Outcome: Adequate for Discharge  10/3/2019 1046 by Starr Cm RN  Outcome: Progressing     Problem: SAFETY ADULT  Goal: Maintain or return to baseline ADL function  Description  INTERVENTIONS:  -  Assess patient's ability to carry out ADLs; assess patient's baseline for ADL function and identify physical deficits which impact ability to perform ADLs (bathing, care of mouth/teeth, toileting, grooming, dressing, etc )  - Assess/evaluate cause of self-care deficits   - Assess range of motion  - Assess patient's mobility; develop plan if impaired  - Assess patient's need for assistive devices and provide as appropriate  - Encourage maximum independence but intervene and supervise when necessary  - Involve family in performance of ADLs  - Assess for home care needs following discharge   - Consider OT consult to assist with ADL evaluation and planning for discharge  - Provide patient education as appropriate  10/3/2019 1323 by Juliano French RN  Outcome: Adequate for Discharge  10/3/2019 1046 by Juliano French RN  Outcome: Progressing  Goal: Maintain or return mobility status to optimal level  Description  INTERVENTIONS:  - Assess patient's baseline mobility status (ambulation, transfers, stairs, etc )    - Identify cognitive and physical deficits and behaviors that affect mobility  - Identify mobility aids required to assist with transfers and/or ambulation (gait belt, sit-to-stand, lift, walker, cane, etc )  - [place] fall precautions as indicated by assessment  - Record patient progress and toleration of activity level on Mobility SBAR; progress patient to next Phase/Stage  - Instruct patient to call for assistance with activity based on assessment  - Consider rehabilitation consult to assist with strengthening/weightbearing, etc   10/3/2019 1323 by Juliano French RN  Outcome: Adequate for Discharge  10/3/2019 1046 by Juliano French RN  Outcome: Progressing     Problem: DISCHARGE PLANNING  Goal: Discharge to home or other facility with appropriate resources  Description  INTERVENTIONS:  - Identify barriers to discharge w/patient and caregiver  - Arrange for needed discharge resources and transportation as appropriate  - Identify discharge learning needs (meds, wound care, etc )  - Arrange for interpretive services to assist at discharge as needed  - Refer to Case Management Department for coordinating discharge planning if the patient needs post-hospital services based on physician/advanced practitioner order or complex needs related to functional status, cognitive ability, or social support system  10/3/2019 1323 by Manuelito Nayak RN  Outcome: Adequate for Discharge  10/3/2019 1046 by Manuelito Nayak RN  Outcome: Progressing     Problem: Knowledge Deficit  Goal: Patient/family/caregiver demonstrates understanding of disease process, treatment plan, medications, and discharge instructions  Description  Complete learning assessment and assess knowledge base  Interventions:  - Provide teaching at level of understanding  - Provide teaching via preferred learning methods  10/3/2019 1323 by Manuelito Nayak RN  Outcome: Adequate for Discharge  10/3/2019 1046 by Manuelito Nayak RN  Outcome: Progressing     Problem: Neurological Deficit  Goal: Neurological status is stable or improving  Description  Interventions:  - Monitor and assess patient's level of consciousness, motor function, sensory function, and level of assistance needed for ADLs  - Monitor and report changes from baseline  Collaborate with interdisciplinary team to initiate plan and implement interventions as ordered  - Provide and maintain a safe environment  - Consider seizure precautions  - Consider fall precautions  - Consider aspiration precautions  - Consider bleeding precautions  10/3/2019 1323 by Manuelito Nayak RN  Outcome: Adequate for Discharge  10/3/2019 1046 by Manuelito Nayak RN  Outcome: Progressing     Problem: Activity Intolerance/Impaired Mobility  Goal: Mobility/activity is maintained at optimum level for patient  Description  Interventions:  - Assess and monitor patient  barriers to mobility and need for assistive/adaptive devices  - Assess patient's emotional response to limitations  - Collaborate with interdisciplinary team and initiate plans and interventions as ordered  - Encourage independent activity per ability   - Maintain proper body alignment    - Perform active/passive rom as tolerated/ordered    - Plan activities to conserve energy   - Turn patient as appropriate  10/3/2019 1323 by Juliano French RN  Outcome: Adequate for Discharge  10/3/2019 1046 by Juliano French, RN  Outcome: Progressing

## 2019-10-03 NOTE — DISCHARGE SUMMARY
Discharge Summary - Dayna Chris 80 y o  male MRN: [id number]  Unit/Bed#: [address] [place] [address] Encounter: [id number]    Admission Date:    9/30/2019   Discharge Date:   10/03/19   Admitting Diagnosis:   Syncope [R55]  Dizzy [R42]  Elevated troponin [R79 89]  Near syncope [R55]  Admitting Provider:   Sadie Amin MD  Discharge Provider:   Sadie Amin MD     Primary Care Physician at Discharge:   Ama Stewart, PP,[phone]    HPI:   80-year-old male with history of CAD, hypertension, anxiety, hyperlipidemia presented to emergency department with near syncopal episode  For a detailed HPI please refer to the admission note  Procedures Performed:   Orders Placed This Encounter   Procedures    ED ECG Documentation Only       Hospital Course:   Patient was admitted with headaches/near-syncope/loss of control of limbs/falls  Patient was seen by Cardiology and Neurology  As per Cardiology patient's elevated troponins are non MI related  EKG is unremarkable  Echocardiogram on September 3rd showed normal ejection fraction with regional wall motion abnormalities consistent with known occlusion of RCA  No further cardiac workup as inpatient  Patient was seen by Neurology and underwent CTA head and neck, MRI brain and cervical spine  CTA revealed subacute infarct at the right occipital lobe and superior aspect of left cerebellum  Extensive atherosclerotic disease throughout the bilateral V4 segments with focal severe stenosis at the right V4  MRI brain showed left cerebellar and right occipital and right thalamus ischemia-acute to subacute  MRI cervical spine showed diffuse cord a trophy  Cord compression C3-C4  Patient was found to have left cerebellar, right occipital and thalamic infarcts and was started on aspirin along with Plavix and Lipitor    As per Neurology continue dual antiplatelet therapy for 2 weeks and then continue on aspirin alone  Patient was seen by Neurosurgery who recommended no contraindication to anticoagulation  Outpatient follow-up with Neurosurgery for cervical stenosis  Patient was found to have acute on chronic kidney disease  Patient is encouraged oral intake  Patient was cleared by Neurology and Neurosurgery for discharge  Patient was seen by Physical therapy and  and recommended skilled nursing rehab    Patient was accepted at [facility] for skilled nursing rehab  Follow-up with PCP in 1 week  Repeat BMP in 1 week  Follow-up with the Neurology as outpatient  Follow-up with Neurosurgery as outpatient for cervical stenosis  Continue aspirin and Plavix for 21 days and then continue on aspirin alone  Return to ER with any worsening neurologic symptoms, chest pain, shortness of breath or any alarming symptoms    Consulting Providers   Neurology, Cardiology, Neurosurgery    Complications:  None    Labs:   Lab Results   Component Value Date    WBC 7 30 10/03/2019    WBC 7 10 08/09/2015    RBC 4 05 10/03/2019    RBC 4 25 08/09/2015    HGB 12 3 10/03/2019    HGB 12 9 08/09/2015    HCT 36 3 (L) 10/03/2019    HCT 38 6 08/09/2015    MCV 90 10/03/2019    MCV 91 08/09/2015    MCH 30 4 10/03/2019    MCH 30 4 08/09/2015    RDW 12 4 10/03/2019    RDW 13 1 08/09/2015     10/03/2019     08/09/2015     Lab Results   Component Value Date    CREATININE 1 43 (H) 10/03/2019    CREATININE 1 12 08/09/2015    BUN 34 (H) 10/03/2019    BUN 21 08/09/2015     08/09/2015    K 3 5 10/03/2019    K 4 6 08/09/2015     10/03/2019     08/09/2015    CO2 26 10/03/2019    CO2 28 4 08/09/2015    GLUCOSE 102 08/09/2015    PROT 7 3 08/09/2015    ALKPHOS 76 10/01/2019    ALKPHOS 88 08/09/2015    ALT 70 10/01/2019    ALT 43 08/09/2015    AST 33 10/01/2019    AST 25 08/09/2015    BILIDIR 0 15 11/08/2017    BILIDIR 0 11 03/18/2014       Treatments:  Aspirin, Plavix, Lipitor, Lovenox, Lexapro, Zestril, Toprol-XL, Ditropan XL, f Flomax    Discharge Diagnosis: Principal Problem:    Syncope  Active Problems:    Essential hypertension    Pure hypercholesterolemia    Depression    Headache    Elevated troponin    CVA (cerebral vascular accident) ([facility] [address] )  Resolved Problems:    * No resolved hospital problems  *      Condition at Discharge:   Good     Code Status: Level 1 - Full Code  Advance Directive and Living Will: <no information>  Power of :    POLST:      Discharge instructions/Information to patient and family:   See after visit summary for information provided to patient and family  Provisions for Follow-Up Care:  See after visit summary for information related to follow-up care and any pertinent home health orders  Disposition:   Skilled nursing facility at [address] Readmission:   No    Discharge Statement   I spent 40 minutes discharging the patient  This time was spent on the day of discharge  I had direct contact with the patient on the day of discharge  Greater than 50% of the total time was spent examining patient, answering all patient questions, arranging and discussing plan of care with patient as well as directly providing post-discharge instructions  Additional time then spent on discharge activities  Discharge Medications:  See after visit summary for reconciled discharge medications provided to patient and family        "This note has been constructed using a voice recognition system"    Moise Donaldson MD  10/3/2019,10:33 AM

## 2019-10-03 NOTE — DISCHARGE INSTRUCTIONS
Neurology recommendations:  - Please continue Asprin 81 mg and Plavix 75 mg daily  - continue Lipitor 40 mg daily  - follow up with [facility] within 4 weeks      Follow-up with PCP in 1 week  Follow-up with the Neurology as outpatient  Repeat BMP in 1 week  Encourage oral intake  Follow-up with Neurosurgery as outpatient for cervical stenosis  Continue aspirin and Plavix for 21 days and then continue on aspirin alone  Return to ER with any worsening neurologic symptoms, chest pain, shortness of breath or any alarming symptoms        Ischemic Stroke   WHAT YOU NEED TO KNOW:   An ischemic stroke occurs when blood flow to a part of your brain is blocked  The blockage is usually caused by a blood clot that gets stuck in a narrow blood vessel  When oxygen cannot get to an area of the brain, tissue in that area may get damaged  The damage can cause loss of body functions controlled by that area of the brain  DISCHARGE INSTRUCTIONS:   Call 911 for any of the following:   ·            · You have any of the following signs of a stroke:      ¨ Numbness or drooping on one side of your face     ¨ Weakness in an arm or leg    ¨ Confusion or difficulty speaking    ¨ Dizziness, a severe headache, or vision loss    · You have a seizure  · You feel lightheaded, short of breath, and have chest pain  · You cough up blood  · You have a severe headache, or loss of balance or coordination  Seek care immediately if:   · Your arm or leg feels warm, tender, and painful  It may look swollen and red  · You have double vision or vision loss  · You have unusual or heavy bleeding  Contact your healthcare provider or neurologist if:   · Your blood pressure is higher or lower than you were told it should be  · You have questions or concerns about your condition or care  Warning signs of a stroke:   The word F A S T  can help you remember and recognize signs of a stroke:  · F = Face:  One side of the face droops  · A = Arms:  One arm starts to drop when both arms are raised  · S = Speech:  Speech is slurred or sounds different than usual     · T = Time:  A person who is having a stroke needs to be seen immediately  A stroke is a medical emergency that needs immediate treatment  Some medicines and treatments work best if given within a few hours of a stroke  Early treatment can decrease the risk of long-term effects of a stroke  ·        Medicines: You may  need any of the following:  · Thrombolytics  help break apart clots  · Antiplatelets , such as aspirin, help prevent blood clots  Take your antiplatelet medicine exactly as directed  These medicines make it more likely for you to bleed or bruise  If you are told to take aspirin, do not take acetaminophen or ibuprofen instead  · Blood thinners    help prevent blood clots  Examples of blood thinners include heparin and warfarin  Clots can cause strokes, heart attacks, and death  The following are general safety guidelines to follow while you are taking a blood thinner:    ¨ Watch for bleeding and bruising while you take blood thinners  Watch for bleeding from your gums or nose  Watch for blood in your urine and bowel movements  Use a soft washcloth on your skin, and a soft toothbrush to brush your teeth  This can keep your skin and gums from bleeding  If you shave, use an electric shaver  Do not play contact sports  ¨ Tell your dentist and other healthcare providers that you take anticoagulants  Wear a bracelet or necklace that says you take this medicine  ¨ Do not start or stop any medicines unless your healthcare provider tells you to  Many medicines cannot be used with blood thinners  ¨ Tell your healthcare provider right away if you forget to take the medicine, or if you take too much  ¨ Warfarin  is a blood thinner that you may need to take  The following are things you should be aware of if you take warfarin      § Foods and medicines can affect the amount of warfarin in your blood  Do not make major changes to your diet while you take warfarin  Warfarin works best when you eat about the same amount of vitamin K every day  Vitamin K is found in green leafy vegetables and certain other foods  Ask for more information about what to eat when you are taking warfarin  § You will need to see your healthcare provider for follow-up visits when you are on warfarin  You will need regular blood tests  These tests are used to decide how much medicine you need  · Other medicines  may be given to treat other health conditions such as high cholesterol, high blood pressure, or diabetes  · Take your medicine as directed  Contact your healthcare provider if you think your medicine is not helping or if you have side effects  Tell him or her if you are allergic to any medicine  Keep a list of the medicines, vitamins, and herbs you take  Include the amounts, and when and why you take them  Bring the list or the pill bottles to follow-up visits  Carry your medicine list with you in case of an emergency  Follow up with your healthcare provider or neurologist as directed: You may need to come in for regular tests of your brain function  You may also need regular blood tests  Write down your questions so you remember to ask them during your visits  Self-care:   · Go to rehabilitation (rehab) as directed  Rehab is an important part of treatment  A speech therapist helps you relearn or improve your ability to talk and swallow  You may start slowly and start doing more difficult tasks over time  Physical therapists can help you gain strength and build endurance  Occupational therapists teach you new ways to do daily activities, such as getting dressed  Therapy can help you improve your ability to walk or keep your balance  Your therapy may include tasks or movements you will need to do for everyday activities   An example is being able to raise or lower yourself from a chair  · Make your home safe  Remove anything you might trip over  Tape electrical cords down  Keep paths clear throughout your home  Make sure your home is well lit  Put nonslip materials on surfaces that might be slippery  An example is your bathtub or shower floor  · Use walking devices as directed  A cane or walker may help you keep your balance as you walk  What you need to know about depression:  Depression can happen after a stroke  Talk to your healthcare provider if you have depression that continues or is getting worse  Your provider may be able to help treat your depression  Your provider can also recommend support groups for you to join  A support group is a place to talk with others who have had a stroke  It may also help to talk to friends and family members about how you are feeling  Tell your family and friends that if they see these signs, to let your healthcare provider know  You may show any of the following signs of depression:  · Extreme sadness    · Avoiding social interaction with family or friends    · A lack of interest in things you once enjoyed    · Irritability    · Trouble sleeping    · Low energy levels    · A change in eating habits or sudden weight gain or loss  Decrease your risk for another stroke:   · Manage health conditions  Take your medicine as directed  Check your blood pressure and blood sugar levels as directed  Keep a record and bring it to your follow-up visits  · Eat a variety of healthy foods  Healthy foods include whole-grain breads, low-fat dairy products, beans, fish, and lean meats  Eat at least 5 servings of fruits and vegetables each day  Choose foods that are low in salt, unhealthy fats (saturated and trans fat), salt, and sugar  Eat foods that are high in potassium, such as potatoes and bananas  Ask your dietitian what other nutrition guidelines you should follow   He or she can help you choose foods that are right for you  · Maintain a healthy weight  Ask your healthcare provider how much you should weigh  Ask him or her to help you create a weight loss plan if you are overweight  Ask about the best exercise plan for you  · Do not drink alcohol  Alcohol can increase your risk for a stroke  Alcohol may also increase your blood pressure or thin your blood  Blood thinning can increase your risk for hemorrhagic stroke  · Do not smoke cigarettes or use illegal drugs  Smoking and drugs increase your risk for a stroke  Nicotine and other chemicals in cigarettes and cigars can also cause lung damage  Ask your healthcare provider for information if you currently smoke or use drugs and need help to quit  E-cigarettes or smokeless tobacco still contain nicotine  Talk to your healthcare provider before you use these products  For support and more information:   · National Stroke Association  [address] , [place] [address]  Phone: 1- 243 - 831-7005  Web Address: [employer] au  org  © 2017 [address] Information is for End User's use only and may not be sold, redistributed or otherwise used for commercial purposes  All illustrations and images included in CareNotes® are the copyrighted property of [employer]  or Patrice Yancey  The above information is an  only  It is not intended as medical advice for individual conditions or treatments  Talk to your doctor, nurse or pharmacist before following any medical regimen to see if it is safe and effective for you

## 2019-10-06 NOTE — QUICK NOTE
Stroke alert called 620 pm  Neurology called back: 625 PM and connected with [facility]  pm- Dr Amezquita  Last known normal: 45 minutes prior     CT H- mild hemorrhagic transformation of left cerebellar stroke- about a week ago  IVtPA: no- as acute stroke about a week ago, current hemorrhagic transformation increased bleed risk  NIHSS- approx 8 per ED exam, with left dysmetria, left hemianopsia, paresis  SBP goal closer to 140s- patient with severe vert stenosis thus recommended 140-160 range-- currently     Admit under stroke care path- at least q2 hour neuro checks, repeat CT H no contrast AM for interval evaluation of bleed   IF worsening neuro deficits then STAT CT H, contact neurology on call  Seizure precautions  Routine MRI brain  Hold antiplatelets/AC at this time- if stable bleed, consider restarting ASA 81 monotherapy after discussion with day time neuro team, for secondary stroke prevention with severe vert stenosis  Recommend neurology consult in AM      When last seen 10/1/19-- by neurology patient with left cerebellar, right occipital and thalamic infarcts, cord compression C3-4 with gliosis and edema also and patient did not want to pursue surgery    D/w Dr Amezquita
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
## 2019-10-07 PROBLEM — D72.829 LEUKOCYTOSIS: Status: ACTIVE | Noted: 2019-01-01

## 2019-10-07 PROBLEM — I61.9 HEMORRHAGIC STROKE (HCC): Status: ACTIVE | Noted: 2019-01-01

## 2019-10-07 PROBLEM — R33.9 URINARY RETENTION: Status: ACTIVE | Noted: 2019-01-01
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
## 2019-10-07 NOTE — PROGRESS NOTES
Pt to CT scan for perfusion study and then back to unit  Dr Catrina Corral up to see patient and speak with daughter  Decision made to proceed with procedure in IR  Pt then transported to IR with monitor and RN

## 2019-10-07 NOTE — OCCUPATIONAL THERAPY NOTE
OT CANCEL NOTE    OT orders received  Chart reviewed  Jin lorenzana RN, who reported pt is going off the floor to IR  Will hold initial OT evaluation  Will continue to follow pt on caseload and see pt when medically stable and as clinically appropriate      Roxana MAYO, OTR/L

## 2019-10-07 NOTE — SOCIAL WORK
Pt is a 30 Day Readmission  Pt was recently [facility] from 09/[facility]-10/03/19  Pt was d/c to [facility]  Pt is currently intubated  Reviewed chart from previous admission  Prior to last admission pt was living in a one level in-law suite at his son's home  2 DANA  Pt was able to complete ADLs independently and ambulated with the use of a RW or SPC as needed  Pt has h/o [facility] and rehab at both [facility] and [facility]  Pt has no h/o MH or D&A  From therapy notes, pt was below baseline at time of d/c  He was requiring assistance for transfers and ADLs and needed the RW at all times  PCP: Dr Bailee Freed: Medications currently managed at [facility]  Contact: Joellen Pereira (son) [phone] or Chanel Looney (daughter) [phone]  Pt has POA but no LW  POA is ________

## 2019-10-07 NOTE — PROGRESS NOTES
Mr Oscar Herrera is immediately post-op from basilar artery thrombectomy  No residual left vertebral artery V4 or basilar thrombus remains  However left vertebral V1 and 2 thrombus likely still present  Despite residual left PCA occlusion and pre-existing strokes, risks of further embolism outweighs risks of worsening hemorrhagic conversion  Given stability on most recent head CT, recommend initiating low-dose heparin gtt (40-60 ptt) without a bolus  Check frequent PTTs  Obtain a CT Head once therapeutic  Keep -160  Stat MRI  Head CT 24 hours post-op

## 2019-10-07 NOTE — RESPIRATORY THERAPY NOTE
RT Ventilator Management Note  Dayna Chris 80 y o  male MRN: [id number]  Unit/Bed#: ICU 11 Encounter: [id number]      Daily Screen       10/7/2019  1220             Patient safety screen outcome[de-identified]  Failed    Not Ready for Weaning due to[de-identified]  Underline problem not resolved            Physical Exam:   Assessment Type: (P) Assess only  General Appearance: (P) Sedated  Respiratory Pattern: (P) Normal  Chest Assessment: (P) Chest expansion symmetrical, Trachea midline  Bilateral Breath Sounds: (P) Clear, Diminished  R Breath Sounds: (P) Clear  L Breath Sounds: (P) Clear  Suction: (P) ET Tube  O2 Device: vent  Subjective Data: intubated/sedated      Resp Comments: (P) Pt remains on AC settings with no changes @ this time  VS are stable and pt appears comfortable  Will continue to monitor

## 2019-10-07 NOTE — PROGRESS NOTES
Critical Care Interval Progress Note     Barb Yao 80 y o  male MRN: [id number]    Unit/Bed#: ICU 11 Encounter: [id number]    Impression:  Principal Problem:    Hemorrhagic stroke 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  Left vert origin near occlusion with thrombus in the proximal segment,  Right vert origin moderate to severe stenosis, treated with angioplasty  Left vertebral artery V4 segment and basilar artery partial occlusion, TICI 1  Basilar artery thrombectomy successfully performed with residual left PCA occlusion  Maintain -160  PRN hydralazine for SBP>160  Phenylephrine for SBP< 120  Nerosurgery following closely  Repeat CT head at 1630    2) Acute blood loss anemia  EBL during intervention 750 cc  Administer 1 unit PRBC; recheck HGB   Maintain greater than 7 0    3) Acute respiratory failure  2/2 procedure and airway protection  Currently maintained on ventilator; AC VC 14 450 5 50  VAP bundled ordered  Currently maintaining on SPO2 > 95%  Post-intubation xray pending  Counseling / Coordination of Care: Total Critical Care time spent 25 minutes excluding procedures, teaching and family updates  ______________________________________________________________________    Chief Complaint: N/A    Recent Events / Nursing Concern:   Patient was taken for thrombectomy at 11 am  S/P thrombectomy  Patient was hypotensive and given 500 NS bolus as well as phenylephrine  SBP>120 after treatment   As per neurosurgeon,  cc in IR; pt will be given 1 unit of prbc  Recheck hemoglobin at 1 pm    See above for further plan  Vitals:   Vitals:    10/07/19 1220 10/07/19 1245 10/07/19 1252 10/07/19 1255   BP:   143/74 150/83   Pulse: 75 72 70 68   Resp:     Temp:  (!) 97 4 °F (36 3 °C) (!) 97 4 °F (36 3 °C) (!) 97 4 °F (36 3 °C)   TempSrc:  Oral Oral Oral   SpO2: 100% 100% 100% 100%   Weight:       Height:         Arterial Line BP: 186/72  Arterial Line MAP (mmHg): 112 mmHg    Temperature: Temp (24hrs), Av 4 °F (36 3 °C), Min:96 9 °F (36 1 °C), Max:97 9 °F (36 6 °C)  Current: Temperature: (!) 97 4 °F (36 3 °C)    Hemodynamic Monitoring:  N/A       Respiratory:  SpO2: SpO2: 100 %       Physical Exam:  Physical Exam   Constitutional: He appears well-developed and well-nourished  HENT:   Head: Normocephalic and atraumatic  Eyes:   Unable to assess due to patients high level of sedation   Neck: Neck supple  ROM unable to assess de to high level of sedation  Cardiovascular: Normal rate, regular rhythm and intact distal pulses  Exam reveals no gallop and no friction rub  No murmur heard  Pulmonary/Chest: Breath sounds normal    Intubated and on ventilator   Abdominal: Soft  Bowel sounds are normal  He exhibits no distension  Musculoskeletal:   Heavily sedated   Neurological:   Unable to assess due to the patient being heavily sedated   Skin: Skin is warm and dry  Psychiatric:   Unable to assess         Allergies:    Allergies   Allergen Reactions    Augmentin [Amoxicillin-Pot Clavulanate] GI Intolerance       Medications:   Scheduled Meds:  Current Facility-Administered Medications:  atorvastatin 40 mg Oral QPM Evelyn Diaz MD    chlorhexidine 15 mL Swish & Spit Q12H [address] Ranjana Pedersen MD    hydrALAZINE 5 mg Intravenous Q6H PRN Gerline Drier, CRNP    phenylephine  mcg/min Intravenous Titrated Gerline Drier, CRNP    sodium chloride 500 mL Intravenous Once Gerline Drier, CRNP    sodium chloride 125 mL/hr Intravenous Continuous CHAD Mascorro Last Rate: Stopped (10/07/19 1159)     Continuous Infusions:  phenylephine  mcg/min    sodium chloride 125 mL/hr Last Rate: Stopped (10/07/19 1159)     PRN Meds:    hydrALAZINE 5 mg Q6H PRN       Labs:   Results from last 7 days   Lab Units 10/07/19  1113 10/07/19  0417 10/06/19  1836 10/03/19  0453 10/02/19  0630 10/01/19  0458   WBC Thousand/uL 12 52* 10 72* 7 28 7 30 8 87 9 34   HEMOGLOBIN g/dL 10 0* 12 6 13 2 12 3 12 5 12 6   HEMATOCRIT % 29 9* 37 9 39 2 36 3* 37 0 38 0   PLATELETS Thousands/uL 287 243 248 222 231 199   NEUTROS PCT % 92* 82*  --  60 71 74   MONOS PCT % 3* 6  --  14* 10 9     Results from last 7 days   Lab Units 10/07/19  0417 10/06/19  1836 10/03/19  0453 10/02/19  0630 10/01/19  0458   SODIUM mmol/L 137 141 137 139 139   POTASSIUM mmol/L 3 9 4 3 3 5 4 1 4 1   CHLORIDE mmol/L 106 104 101 100 103   CO2 mmol/L 24 28 26 30 29   ANION GAP mmol/L 7 9 10 9 7   BUN mg/dL 17 20 34* 19 18   CREATININE mg/dL 0 86 1 07 1 43* 1 30 1 02   CALCIUM mg/dL 8 7 8 8 8 6 8 9 8 8   GLUCOSE RANDOM mg/dL 197* 149* 112 113 133   ALT U/L  --   --   --   --  70   AST U/L  --   --   --   --  33   ALK PHOS U/L  --   --   --   --  76   ALBUMIN g/dL  --   --   --   --  3 4*   TOTAL BILIRUBIN mg/dL  --   --   --   --  0 90     Results from last 7 days   Lab Units 10/07/19  0417 10/01/19  0458   MAGNESIUM mg/dL 2 4 2 2   PHOSPHORUS mg/dL 3 3 3 7      Results from last 7 days   Lab Units 10/07/19  0417 10/06/19  1836   INR  1 03 1 01   PTT seconds 30 30      Results from last 7 days   Lab Units 09/30/19  1519   TROPONIN I ng/mL 0 11*         ABG:    VBG:    Results from last 7 days   Lab Units 10/02/19  1119   PROCALCITONIN ng/ml 0 06       Diagnostic Imaging / Data: I have personally reviewed pertinent films in PACS  EKG: reviewed; NSR  Code Status: Level 1 - Full Code    Portions of the record may have been created with voice recognition software    Occasional wrong word or "sound a like" substitutions may have occurred due to the inherent limitations of voice recognition software  Read the chart carefully and recognize, using context, where substitutions have occurred      SIGNATURE: Leone Dakin, MD  DATE: October 7, 2019  TIME: 1:09 PM

## 2019-10-07 NOTE — ANESTHESIA PROCEDURE NOTES
Arterial Line Insertion  Performed by: Katy Loza CRNA  Authorized by: Nellie Mcdonald MD   Consent: Verbal consent obtained  Written consent obtained  Risks and benefits: risks, benefits and alternatives were discussed  Consent given by: patient  Patient understanding: patient states understanding of the procedure being performed  Patient consent: the patient's understanding of the procedure matches consent given  Procedure consent: procedure consent matches procedure scheduled  Relevant documents: relevant documents present and verified  Test results: test results available and properly labeled  Site marked: the operative site was not marked  Radiology Images: Radiology Images displayed and confirmed  If images not available, report reviewed  Required items: required blood products, implants, devices, and special equipment available  Patient identity confirmed: hospital-assigned identification number and arm band  Time out: Immediately prior to procedure a "time out" was called to verify the correct patient, procedure, equipment, support staff and site/side marked as required    Indications: hemodynamic monitoring  Orientation:  Right  Location: radial artery  Procedure Details:  Benitez's test normal: yes  Needle gauge: 20  Seldinger technique: Seldinger technique used  Number of attempts: 1    Post-procedure:  Post-procedure: dressing applied  Patient tolerance: Patient tolerated the procedure well with no immediate complications

## 2019-10-07 NOTE — PROGRESS NOTES
Received SBAR report from Kyler Cobos  ED RN regarding pt's admission inpatient  Took report and getting room ready when this [place] got informed by NP Scar Terrell that pt is being transferred to [facility]  Received call from [facility] confirming the same

## 2019-10-07 NOTE — SPEECH THERAPY NOTE
Speech Language/Pathology  Pt returned from IR, remains intubated  Please re consult when extubated & appropriate

## 2019-10-07 NOTE — PLAN OF CARE

## 2019-10-07 NOTE — PROGRESS NOTES
Progress Note - Critical Care   Tali Reynoso 80 y o  male MRN: [id number]  Unit/Bed#: ICU 11 Encounter: [id number]    Assessment:  · Cerebellar Vascular Accident  · Acute Encephalopathy  · Leukocytosis      Plan:    Neuro:  · Subacute infarct at the right occipital lobe and superior aspect of the left cerebellum with Development of hemorrhagic transformation compared to the CTA completed on 09/30  · CT cerebral perfusion: results pending  · CTA head and neck w/o contrast: results pending   · Angiography w/ Thrombectomy (10/7)  · Consulting with neurosurgery      CV:  · Hypertension:  · BP: 145/82 at 0600  · Continue Hydralazine & Labetalol   · SBP Goal: will assess appropriate goal after angiography   · Hypercholesteremia:   · Continue atorvastatin     Lung:   · No active issues     GI:  · Zofran PRN  · GI Prophylaxis  · Protonix Injection 40mg     FEN:   · Diet: NPO  · Monitor I's & O's  · Phosphorus: 3 3  · Magnesium: 2 4  · Administered Normal Saline     :  · Cortez Catheter  · Acute Kidney Injury:   · Improving since 10/3  · BUN: 17  · Previously 34 on 10/3  · Baseline: 18  · Creatinine: 0 86  · Previously 1 43 on 10/3  · Baseline: 1 02  · No acute issues     ID:   · Leukocytosis  · WBC: 10 72  · Will trend  · Currently afebrile (97 9F)      Heme:   · Protime: 13 1  · INR: 1 03  · PTT: 30     Endo:  · High Blood Glucose  · Random Glucose: 197  · A1c: 6 2%  · Start on Insulin Sliding Scale:   · No active issues                Msk/Skin:  · Pressure injury of the right knee   · Consult wound care  · Frequent repositioning for ulcer prophylaxis     Disposition: continue ICU monitoring      HPI/24hr events: Pt presented to [facility] around 4am on 10/7 after being transferred from [address] [place] [address]  On 9/30, he was found down by his son with stool incontinence  MRI on 09/30 revealed acute to subacute left cerebellar and right occipital and right thalamus infarct  He was seen by neurology on 10/01   CTA was also performed  He was sent home with ASA and plavix  He presented with worsening left sided weakness to [facility] with a stroke with hemorrhagic transformation on 10/6 as seen on the repeat CT  Physical Exam:  Physical Exam   Constitutional: He appears well-developed and well-nourished  HENT:   Head: Normocephalic and atraumatic  Nose: Nose normal    Eyes: Pupils are equal, round, and reactive to light  Conjunctivae are normal  No scleral icterus  Abdominal: Soft  There is no tenderness  Tenderness in the suprapubic area, may be retaining urine    Musculoskeletal: He exhibits no edema or tenderness  Skin: Skin is warm  Review of Systems:  Review of Systems   Unable to perform ROS: Acuity of condition       Vitals:    10/07/19 0342 10/07/19 0400 10/07/19 0500 10/07/19 0600   BP: (!) 179/87 (!) 193/88 162/85 145/82   Pulse: 60 60 62 60   Resp: (!) 24 (!) 27 (!) 25 (!) 23   Temp:  97 9 °F (36 6 °C)     TempSrc:  Oral     SpO2: 98% 97% 97% 97%   Weight: 71 7 kg (158 lb 1 1 oz)      Height: 5' 10" (1 778 m)                Temperature:   Temp (24hrs), Av 7 °F (36 5 °C), Min:97 5 °F (36 4 °C), Max:97 9 °F (36 6 °C)    Current: Temperature: 97 9 °F (36 6 °C)    Weights:   IBW: 73 kg    Body mass index is 22 68 kg/m²  Weight (last 2 days)     Date/Time   Weight    10/07/19 0342   71 7 (158 07)              Hemodynamic Monitoring:     Non-Invasive/Invasive Ventilation Settings:  Respiratory    Lab Data (Last 4 hours)    None         O2/Vent Data (Last 4 hours)    None                Intake and Outputs:  I/O       10/05 0701 - 10/06 0700 10/06 0701 - 10/07 0700    Urine (mL/kg/hr)  150    Total Output  150    Net  -150              Nutrition:        Diet Orders   (From admission, onward)             Start     Ordered    10/07/19 0402  Diet NPO  Diet effective now     Question Answer Comment   Diet Type NPO    RD to adjust diet per protocol?  Yes        10/07/19 040                Labs: Results from last 7 days   Lab Units 10/07/19  0417 10/06/19  1836 10/03/19  0453 10/02/19  0630   WBC Thousand/uL 10 72* 7 28 7 30 8 87   HEMOGLOBIN g/dL 12 6 13 2 12 3 12 5   HEMATOCRIT % 37 9 39 2 36 3* 37 0   PLATELETS Thousands/uL 243 248 222 231   NEUTROS PCT % 82*  --  60 71   MONOS PCT % 6  --  14* 10      Results from last 7 days   Lab Units 10/07/19  0417 10/06/19  1836 10/03/19  0453  10/01/19  0458   SODIUM mmol/L 137 141 137   < > 139   POTASSIUM mmol/L 3 9 4 3 3 5   < > 4 1   CHLORIDE mmol/L 106 104 101   < > 103   CO2 mmol/L 24 28 26   < > 29   BUN mg/dL 17 20 34*   < > 18   CREATININE mg/dL 0 86 1 07 1 43*   < > 1 02   CALCIUM mg/dL 8 7 8 8 8 6   < > 8 8   ALK PHOS U/L  --   --   --   --  76   ALT U/L  --   --   --   --  70   AST U/L  --   --   --   --  33    < > = values in this interval not displayed  Results from last 7 days   Lab Units 10/07/19  0417 10/01/19  0458 09/30/19  0825   MAGNESIUM mg/dL 2 4 2 2 2 1     Results from last 7 days   Lab Units 10/07/19  0417 10/01/19  0458   PHOSPHORUS mg/dL 3 3 3 7      Results from last 7 days   Lab Units 10/07/19  0417 10/06/19  1836   INR  1 03 1 01   PTT seconds 30 30         0   Lab Value Date/Time    TROPONINI 0 11 (H) 09/30/2019 1519    TROPONINI 0 09 (H) 09/30/2019 1154    TROPONINI 0 11 (H) 09/30/2019 0825    TROPONINI <0 02 08/06/2019 1449    TROPONINI <0 02 02/06/2017 0045    TROPONINI <0 02 02/05/2017 2159    TROPONINI <0 02 02/05/2017 1806    TROPONINI <0 04 04/20/2014 0520    TROPONINI <0 04 04/19/2014 2340    TROPONINI <0 04 04/19/2014 1620       Imaging:  I have personally reviewed pertinent reports  EKG: pending    Micro:  Lab Results   Component Value Date    URINECX No Growth <1000 cfu/mL 12/27/2018    URINECX >100,000 cfu/ml Escherichia coli (A) 09/17/2018       Allergies:    Allergies   Allergen Reactions    Augmentin [Amoxicillin-Pot Clavulanate] GI Intolerance       Medications:   Scheduled Meds:    Current Facility-Administered Medications:  chlorhexidine 15 mL Swish & Spit Q12H [address] Cheri Childress MD    multi-electrolyte 125 mL/hr Intravenous Continuous Cheri Lines, MD Last Rate: 125 mL/hr (10/07/19 0418)     Continuous Infusions:    multi-electrolyte 125 mL/hr Last Rate: 125 mL/hr (10/07/19 0418)     PRN Meds:       VTE Pharmacologic Prophylaxis: Reason for no pharmacologic prophylaxis thrombectomy  VTE Mechanical Prophylaxis: sequential compression device    Invasive lines and devices: Invasive Devices     Peripheral Intravenous Line            Peripheral IV 10/06/19 Right Antecubital less than 1 day          Drain            External Urinary Catheter Medium less than 1 day                   Counseling / Coordination of Care  Total time spent today 30 minutes  Greater than 50% of total time was spent with the patient and / or family counseling and / or coordination of care  A description of the counseling / coordination of care: Kong Levi Code Status: Level 1 - Full Code     Portions of the record may have been created with voice recognition software  Occasional wrong word or "sound a like" substitutions may have occurred due to the inherent limitations of voice recognition software  Read the chart carefully and recognize, using context, where substitutions have occurred       Cynthia Layne

## 2019-10-07 NOTE — DISCHARGE INSTRUCTIONS
ARTERIOGRAM    WHAT YOU SHOULD KNOW:   An angiogram is a procedure to look at arteries in your body  Arteries are the blood vessels that carry blood from your heart to your body  AFTER YOU LEAVE:     Self-care:   · Limit activity: Rest for the remainder of the day of your procedure  Have some one with you until the next morning  Keep your arm or leg straight as much as possible  Rest as much as possible, sitting lying or reclining  Walk only to go to the bathroom, to bed or to eat  If the angiogram catheter was put in your leg, use the stairs as little as possible  No driving  · Keep your wound clean and dry  You may shower 24 hours after your procedure  The bandage you have on should fall off in 2-3 days  If there is any drainage from the puncture site, you should put on a clean bandage  · Watch for bleeding and bruising: It is normal to have a bruise and soreness where the angiogram catheter went in  · Diet:   · You may resume your regular diet, Sips of flat soda will help with mild nausea  · Drink more liquids than usual for the next 24 hours      · IMMEDIATELY Contact Interventional Radiology at [phone] Denise PATIENTS: Contact Interventional Radiology at [phone]) Himanshu Villalta PATIENTS: Contact Interventional Radiology at [phone]) if any of the following occur:  · If your bruise gets larger or if you notice any active bleeding  APPLY DIRECT PRESSURE TO THE BLEEDING SITE  · If you notice increased swelling or have increased pain at the puncture site   · If you have any numbness or pain in the extremity of the puncture site   · If that extremity seems cold or pale      · You have fever greater than 101  · Persistent nausea or vomitting    Follow up with your primary healthcare provider  as directed: Write down your questions so you remember to ask them during your visits

## 2019-10-07 NOTE — PROCEDURES
Insert PICC line  Date/Time: 10/7/2019 3:03 PM  Performed by: Castillo Ashton RN  Authorized by: CHAD Us     Patient location:  Bedside  Other Assisting Provider: Yes (comment) (Octavia Bertrand, Infusion Tech)    Consent:     Consent obtained:  Written    Consent given by:  Healthcare agent    Procedural risks discussed: when obtaining consent  Alternatives discussed: when obtaining consent  Universal protocol:     Procedure explained and questions answered to patient or proxy's satisfaction: yes      Relevant documents present and verified: yes      Test results available and properly labeled: yes      Radiology Images displayed and confirmed  If images not available, report reviewed: yes      Required blood products, implants, devices, and special equipment available: yes      Site/side marked: yes      Immediately prior to procedure, a time out was called: yes      Patient identity confirmed:  Arm band and hospital-assigned identification number  Pre-procedure details:     Hand hygiene: Hand hygiene performed prior to insertion      Sterile barrier technique: All elements of maximal sterile technique followed      Skin preparation:  ChloraPrep    Skin preparation agent: Skin preparation agent completely dried prior to procedure    Indications:     PICC line indications: medications requiring central line    Anesthesia (see MAR for exact dosages):      Anesthesia method:  Local infiltration    Local anesthetic:  Lidocaine 1% w/o epi (3 mls)  Procedure details:     Location:  Basilic    Vessel type: vein      Laterality:  Right    Approach: percutaneous technique used      Patient position:  Flat    Procedural supplies:  Triple lumen    Catheter size:  5 Fr    Landmarks identified: yes      Ultrasound guidance: yes      Sterile ultrasound techniques: Sterile gel and sterile probe covers were used      Number of attempts:  1    Successful placement: yes      Vessel of catheter tip end:  Marquis 3CG confirmed (ok to use, no xray required)    Total catheter length (cm):  43    Catheter out on skin (cm):  0    Max flow rate:  999    Arm circumference:  29  Post-procedure details:     Post-procedure:  Dressing applied and securement device placed    Assessment:  Blood return through all ports    Post-procedure complications: none      Patient tolerance of procedure:   Tolerated well, no immediate complications

## 2019-10-07 NOTE — RESPIRATORY THERAPY NOTE
RT Ventilator Management Note  Earl Sears 80 y o  male MRN: [id number]  Unit/Bed#: ICU 11 Encounter: [id number]      Daily Screen       10/7/2019  1220             Patient safety screen outcome[de-identified]  Failed    Not Ready for Weaning due to[de-identified]  Underline problem not resolved            Physical Exam:   Assessment Type: Assess only  General Appearance: Sedated  Respiratory Pattern: Assisted  Chest Assessment: Chest expansion symmetrical  Bilateral Breath Sounds: Clear, Diminished  O2 Device: vent  Subjective Data: intubated/sedated      Resp Comments: Pt in IR intubated, placed on transport vent and taken to CT then ICU #11 via transport vent  Placed on [facility] settings in room, no issues  Will continue to monitor

## 2019-10-07 NOTE — ED NOTES
Went in to room with Dr Eva Arndt; introduced myself; Dr Eav Arndt examining pt and performing neuro exam and pt's son is questioning how we really know what his exam showed earlier; cursed at answers given; not agreeable with answers given; argumentative about what should be done even when Dr Eva Arndt gave reasons  He also complained about his last admission to a [facility] and "all the bullshit "  I noted pt did not have a pillow so I asked son if pt typically does not like pillows or I would be glad to get him a pillow; he responded, "it's all from a stroke he had "  Daughter entered room and informed Dr Eva Arndt that she has her  on the phone and everything is being recorded and if any mistake was made there "will be big problems "  At this time, daughter wants to talk to neuro and Dr Eva Arndt agreed to call him again  Daughter also decided she wants pt to go upstairs and not be transferred to [place] because she stated she feels that would be hard on the pt at this hour  Assessment at this time shows NSR on cardiac monitor; neurologically a left field cut is noted, left sided weakness, pt is confused and not answering questions appropriately; speech is clear  Daughter also asking for another CT to be done  Dr Eva Arndt explained that it is not medically warranted at this time    They asked the risks and she explained them; son replied, "that is bullshit, I don't want to hear about exposure to radiation, he is 80years old "     Lindsay Hernandez RN  10/07/19 0000       Lindsay Hernandez RN  10/07/19 0004

## 2019-10-07 NOTE — ANESTHESIA PREPROCEDURE EVALUATION
Review of Systems/Medical History  Patient summary reviewed        Cardiovascular  EKG reviewed, Hyperlipidemia, Hypertension , CAD ,   Comment: LEFT VENTRICLE:  Systolic function was normal by visual assessment  Ejection fraction was estimated to be 55 %  There was akinesis of the basal inferior and basal inferolateral wall(s)  Wall thickness was mildly to moderately increased  Doppler parameters were consistent with abnormal left ventricular relaxation (grade 1 diastolic dysfunction)      RIGHT VENTRICLE:  The ventricle was mildly dilated      MITRAL VALVE:  There was moderate annular calcification  There was trace regurgitation      AORTIC VALVE:  There was mild regurgitation      TRICUSPID VALVE:  There was trace regurgitation  ,  Pulmonary  Negative pulmonary ROS        GI/Hepatic  Negative GI/hepatic ROS   PUD,        Negative  ROS        Endo/Other  Negative endo/other ROS      GYN  Negative gynecology ROS          Hematology  Negative hematology ROS Anemia ,     Musculoskeletal  Negative musculoskeletal ROS        Neurology    CVA , acute, Headaches,    Psychology   Negative psychology ROS Anxiety, Depression ,              Physical Exam    Airway    Mallampati score: II  TM Distance: >3 FB  Neck ROM: full     Dental       Cardiovascular  Rhythm: regular, Rate: abnormal, Cardiovascular exam normal    Pulmonary  Pulmonary exam normal Breath sounds clear to auscultation,     Other Findings  Unable to follow commands      Anesthesia Plan  ASA Score- 4     Anesthesia Type- general with ASA Monitors  Additional Monitors: arterial line  Airway Plan: ETT  Comment: Consent via daughter  Plan Factors-  Patient did not smoke on day of surgery  Induction- intravenous  Postoperative Plan- Plan for postoperative opioid use  Informed Consent- Anesthetic plan and risks discussed with patient and daughter  I personally reviewed this patient with the CRNA   Discussed and agreed on the Anesthesia Plan with the JENY Kumar

## 2019-10-07 NOTE — ED NOTES
Dr Loan Madden spoke with neuro who has agreed to have pt transferred to [place]; she went in and talked with family and they now agree with transfer to [facility]  Await transfer time  I spoke with Shoaib Suarez RN upstairs in ICU and she is aware that pt will not be coming to their floor       Isaías Jack RN  10/07/19 0002

## 2019-10-07 NOTE — BRIEF OP NOTE (RAD/CATH)
IR CEREBRAL ANGIOGRAPHY / INTERVENTION  Procedure Note    PATIENT NAME: Hailey Briseno  : 1935  MRN: [id number]     Pre-op Diagnosis:   1  Cerebrovascular accident (CVA), unspecified mechanism ([facility] [address] )    2  Intracranial hemorrhage (HCC)      Post-op Diagnosis:   1  Cerebrovascular accident (CVA), unspecified mechanism ([facility] [address] )    2  Intracranial hemorrhage 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

## 2019-10-07 NOTE — SPEECH THERAPY NOTE
Speech Language/Pathology  Pt off the floor in IR, is currently intubated, SLP  will assess as able upon his return & if he is extubated

## 2019-10-07 NOTE — PHYSICAL THERAPY NOTE
Physical Therapy Cancellation Note      PT orders received, chart review performed  Pt currently off the floor in IR   PT to hold evaluation, continue to follow     Misty Harper PT, DPT

## 2019-10-07 NOTE — ED CARE HANDOFF
Emergency Department Sign Out Note        Sign out and transfer of care from Dr Carmen Rincon  See Separate Emergency Department note  The patient, Anaya Guerin, was evaluated by the previous provider for CVA  Workup Completed:  Labs Reviewed   BASIC METABOLIC PANEL - Abnormal       Result Value Ref Range Status    Sodium 141  136 - 145 mmol/L Final    Potassium 4 3  3 5 - 5 3 mmol/L Final    Chloride 104  100 - 108 mmol/L Final    CO2 28  21 - 32 mmol/L Final    ANION GAP 9  4 - 13 mmol/L Final    BUN 20  5 - 25 mg/dL Final    Creatinine 1 07  0 60 - 1 30 mg/dL Final    Comment: Standardized to IDMS reference method    Glucose 149 (*) 65 - 140 mg/dL Final    Comment:   If the patient is fasting, the ADA then defines impaired fasting glucose as > 100 mg/dL and diabetes as > or equal to 123 mg/dL  Specimen collection should occur prior to Sulfasalazine administration due to the potential for falsely depressed results  Specimen collection should occur prior to Sulfapyridine administration due to the potential for falsely elevated results      Calcium 8 8  8 3 - 10 1 mg/dL Final    eGFR 64  ml/min/1 73sq m Final    Narrative:     [place] guidelines for Chronic Kidney Disease (CKD):     Stage 1 with normal or high GFR (GFR > 90 mL/min/1 73 square meters)    Stage 2 Mild CKD (GFR = 60-89 mL/min/1 73 square meters)    Stage 3A Moderate CKD (GFR = 45-59 mL/min/1 73 square meters)    Stage 3B Moderate CKD (GFR = 30-44 mL/min/1 73 square meters)    Stage 4 Severe CKD (GFR = 15-29 mL/min/1 73 square meters)    Stage 5 End Stage CKD (GFR <15 mL/min/1 73 square meters)  Note: GFR calculation is accurate only with a steady state creatinine   POCT GLUCOSE - Abnormal    POC Glucose 167 (*) 65 - 140 mg/dl Final   CBC AND PLATELET - Normal    WBC 7 28  4 31 - 10 16 Thousand/uL Final    RBC 4 31  3 88 - 5 62 Million/uL Final    Hemoglobin 13 2  12 0 - 17 0 g/dL Final    Hematocrit 39 2  36 5 - 49 3 % Final    MCV 91  82 - 98 fL Final    MCH 30 6  26 8 - 34 3 pg Final    MCHC 33 7  31 4 - 37 4 g/dL Final    RDW 12 6  11 6 - 15 1 % Final    Platelets 798  439 - 390 Thousands/uL Final    MPV 9 7  8 9 - 12 7 fL Final   PROTIME-INR - Normal    Protime 13 0  11 6 - 14 5 seconds Final    INR 1 01  0 84 - 1 19 Final   APTT - Normal    PTT 30  23 - 37 seconds Final    Comment: Therapeutic Heparin Range =  60-90 seconds   TYPE AND SCREEN    ABO Grouping A   Final    Rh Factor Positive   Final    Antibody Screen Negative   Final    Specimen Expiration Date [id number]   Final      CT stroke alert brain   Final Result      Acute to subacute superior left cerebellar hemisphere and right occipital lobe infarcts which both demonstrate mild interval development of hemorrhagic transformation when compared to a CTA head and neck dated September 30, 2019  An MRI brain could be    performed for further evaluation  No new areas of acute hemorrhage or ischemia  Moderate chronic small vessel ischemic changes  Findings were directly discussed with Darren Reyes on 10/6/2019 6:47 PM       Workstation performed: [id number]         X-ray chest 1 view portable    (Results Pending)         ED Course / Workup Pending (followup): Pt was signed out from Dr Vielka Das, admitted to medicine service   Donato Pulse, Critical care NP saw pt in ED, and notified me that pt's daughter no longer wanted him admitted here, and wants to go to [place]          Stroke Assessment     Row Name 10/06/19 9120             NIH Stroke Scale    Interval  Baseline      Level of Consciousness (1a )  0      LOC Questions (1b )  0      LOC Commands (1c )  0      Best Gaze (2 )  0      Visual (3 )  2      Facial Palsy (4 )  0      Motor Arm, Left (5a )  2      Motor Arm, Right (5b )  0      Motor Leg, Left (6a )  1      Motor Leg, Right (6b )  0      Limb Ataxia (7 )  2      Sensory (8 )  0      Best Language (9 )  0      Dysarthria (10 )  0 Extinction and Inattention (11 ) (Formerly Neglect)  1      Total  8          First Filed Value   TPA Decision  Patient not a TPA candidate  Patient is not a candidate options  Recent significant trauma/stroke  ED Course as of Oct 07 0259   Vic Leonard Oct 06, 2019   2154 Multiple extensive conversations with daughter about what she would like to do  She wants to wait for her brother to arrive, should be shortly  2333 Had another lengthy conversation with pt's children (son, 2 daughters) all bedside  Repeat neuro exam appears to be unchanged from previous  Still has left sided weakness, left sided neglect, and left facial droop  Is alert, and cooperative, but confused, which he was reported to be before  Pt's family no longer wants him to be transferred, would prefer he stays here  They asked for another CT, which I told them was not medically necessary since there appears to be no change in his neuro exam from previous  They are requesting I call Dr Beverly Almeida so they can speak to him  They also noted multiple times that they have their  on the phone  2350 Discussed w/ Dr Beverly Almeida, stated it is fine to transfer to [place], recommends discussing with [facility]  I updated pt and family  Mon Oct 07, 2019   0345 Discussed with critical care fellow, Dr Ingrid Mohr, will discuss with attending and get back to me  0138 BP elevated despite 2 doses of labetolol  HR no change  No change in neuro exam  Will trial cardene  0213 Cardene held,  after short duration of medication (<5 mins at 5mg/hr)  Pt has a bed at [place], Dr Enmanuel Nguyen accepted           Procedures  MDM    Disposition  Final diagnoses:   Stroke [facility])     Time reflects when diagnosis was documented in both MDM as applicable and the Disposition within this note     Time User Action Codes Description Comment    10/6/2019  8:32 PM Magalie Rojas Add [I63 9] Stroke (cerebrum) [facility])     10/6/2019  8:32 PM Lesa Nath Ta Mascorro [I63 9] Stroke (cerebrum) ([facility] [address] )     10/6/2019  8:32 PM Yung David [I63 9] Stroke [facility])       ED Disposition     ED Disposition Condition Date/Time Comment    Transfer to Another Facility-In Network  [place] Oct 6, 2019 11:50 PM       MD Documentation      Most Recent Value   Patient Condition  The patient has been stabilized such that within reasonable medical probability, no material deterioration of the patient condition or the condition of the unborn child(skinny) is likely to result from the transfer   Reason for Transfer  Level of Care needed not available at this facility, Patient/Family request   Benefits of Transfer  Specialized equipment and/or services available at the receiving facility (Include comment)________________________ [Neurosurgery]   Risks of Transfer  Potential for delay in receiving treatment, Potential deterioration of medical condition, Loss of IV, Increased discomfort during transfer, Possible worsening of condition or death during transfer   Accepting Physician  Dr Bharat Pepper Name, [facility]   Sending MD Dr Shikha Moreno   Provider Certification  General risk, such as traffic hazards, adverse weather conditions, rough terrain or turbulence, possible failure of equipment (including vehicle or aircraft), or consequences of actions of persons outside the control of the transport personnel, Unanticipated needs of medical equipment and personnel during transport, Risk of worsening condition, The possibility of a transport vehicle being unavailable      RN Documentation      Most [facility] Name, [address]    None       Patient's Medications   Discharge Prescriptions    No medications on file     No discharge procedures on file         ED Provider  Electronically Signed by     Abdulaziz Pulliam MD  10/07/19 8672

## 2019-10-07 NOTE — SOCIAL WORK
Met with pt daughter Brea Calderon at pt bedside to discuss role of CM and to discuss any needs pt may have prior to d/c  Daughter is unsure if the family wants pt to return to [facility] vs another SNF at time of d/c  CM offered to provide SNF list for daughter to look over  Daughter requested more time before reviewing choices  Daughter has no complaints with [facility] just states she wants to see what her father's level of functioning is and other medical needs he may have a time of d/c      Daughter did provide email address as follows- [email or web address]     CM provided daughter with phone number should she have any additional questions  CM to follow for d/c needs and recommendations
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
## 2019-10-07 NOTE — EMTALA/ACUTE CARE TRANSFER
[address] [facility]   [address] [facility]  [id number]  Dept: [phone]      EMTALA TRANSFER CONSENT    NAME Barrera DAMIAN 1935                              MRN [id number]    I have been informed of my rights regarding examination, treatment, and transfer   by Dr Akhtar att  providers found    Benefits: Specialized equipment and/or services available at the receiving facility (Include comment)________________________(Neurosurgery)    Risks: Potential for delay in receiving treatment, Potential deterioration of medical condition, Loss of IV, Increased discomfort during transfer, Possible worsening of condition or death during transfer      Transfer Request   I acknowledge that my medical condition has been evaluated and explained to me by the emergency department physician or other qualified medical person and/or my attending physician who has recommended and offered to me further medical examination and treatment  I understand the Hospital's obligation with respect to the treatment and stabilization of my emergency medical condition  I nevertheless request to be transferred  I release the Hospital, the doctor, and any other persons caring for me from all responsibility or liability for any injury or ill effects that may result from my transfer and agree to accept all responsibility for the consequences of my choice to transfer, rather than receive stabilizing treatment at the Hospital  I understand that because the transfer is my request, my insurance may not provide reimbursement for the services  The Hospital will assist and direct me and my family in how to make arrangements for transfer, but the hospital is not liable for any fees charged by the transport service    In spite of this understanding, I refuse to consent to further medical examination and treatment which has been offered to me, and request transfer to [facility] [address]  I authorize the performance of emergency medical procedures and treatments upon me in both transit and upon arrival at the receiving facility  Additionally, I authorize the release of any and all medical records to the receiving facility and request they be transported with me, if possible  I authorize the performance of emergency medical procedures and treatments upon me in both transit and upon arrival at the receiving facility  Additionally, I authorize the release of any and all medical records to the receiving facility and request they be transported with me, if possible  I understand that the safest mode of transportation during a medical emergency is an ambulance and that the Hospital advocates the use of this mode of transport  Risks of traveling to the receiving facility by car, including absence of medical control, life sustaining equipment, such as oxygen, and medical personnel has been explained to me and I fully understand them  (YASSINE CORRECT BOX BELOW)  [  ]  I consent to the stated transfer and to be transported by ambulance/helicopter  [  ]  I consent to the stated transfer, but refuse transportation by ambulance and accept full responsibility for my transportation by car  I understand the risks of non-ambulance transfers and I exonerate the Hospital and its staff from any deterioration in my condition that results from this refusal     X___________________________________________    DATE  10/07/19  TIME________  Signature of patient or legally responsible individual signing on patient behalf           RELATIONSHIP TO PATIENT_________________________          Provider Certification    NAME Niraj Cali                                         1935                              MRN [id number]    A medical screening exam was performed on the above named patient    Based on the examination:    Condition Necessitating Transfer The encounter diagnosis was Stroke [facility])  Patient Condition: The patient has been stabilized such that within reasonable medical probability, no material deterioration of the patient condition or the condition of the unborn child(skinny) is likely to result from the transfer    Reason for Transfer: Level of Care needed not available at this facility, Patient/Family request    Transfer Requirements: [address]   · Space available and qualified personnel available for treatment as acknowledged by    · Agreed to accept transfer and to provide appropriate medical treatment as acknowledged by       Dr Jodi Gomes  · Appropriate medical records of the examination and treatment of the patient are provided at the time of transfer   [address], [address] _______  · Transfer will be performed by qualified personnel from    and appropriate transfer equipment as required, including the use of necessary and appropriate life support measures      Provider Certification: I have examined the patient and explained the following risks and benefits of being transferred/refusing transfer to the patient/family:  General risk, such as traffic hazards, adverse weather conditions, rough terrain or turbulence, possible failure of equipment (including vehicle or aircraft), or consequences of actions of persons outside the control of the transport personnel, Unanticipated needs of medical equipment and personnel during transport, Risk of worsening condition, The possibility of a transport vehicle being unavailable      Based on these reasonable risks and benefits to the patient and/or the unborn child(skinny), and based upon the information available at the time of the patients examination, I certify that the medical benefits reasonably to be expected from the provision of appropriate medical treatments at another medical facility outweigh the increasing risks, if any, to the individuals medical condition, and in the case of labor to the unborn child, from effecting the transfer      X____________________________________________ DATE 10/07/19        TIME_______      ORIGINAL - SEND TO MEDICAL RECORDS   COPY - SEND WITH PATIENT DURING TRANSFER

## 2019-10-07 NOTE — ANESTHESIA POSTPROCEDURE EVALUATION
Post-Op Assessment Note    CV Status:  Stable  Pain scale: unable to assess      Pain management: adequate     Mental Status:  Unresponsive   Hydration Status:  Stable   PONV Controlled:  None   Airway Patency:  Patent  Airway: intubated   Post Op Vitals Reviewed: Yes      Staff: AnesthesiologistJENY           BP  148/62   Temp  95 7   Pulse  62   Resp  12   SpO2   100
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
## 2019-10-08 PROBLEM — G93.6 CEREBRAL EDEMA (HCC): Status: ACTIVE | Noted: 2019-01-01

## 2019-10-08 PROBLEM — G93.6 CEREBRAL EDEMA (HCC): Status: RESOLVED | Noted: 2019-01-01 | Resolved: 2019-01-01

## 2019-10-08 NOTE — NUTRITION
In light of Propofol @ 8 5 ml/hr, recommend increase Jevity 1 2 as tolerated to goal rate of 60 ml/hr plus 1 PROSOURCE to provide qd: 1440 ml,1788 Kcal,95 gm PRO,244 gm CHO,57 gm Fat,1162 ml Free H2O,2 2 mg CHO/kg/min  Check Ionized Ca

## 2019-10-08 NOTE — PROGRESS NOTES
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 Left sided hemiparesis & hemineglect  · S/P Angiogram w/ Basilar Artery Thrombectomy (10/7)  ? Findings:   · Left vertebral a origin near occlusion with thrombus in the proximal segment  · Right vertebral a origin moderate to severe stenosis, treated with angioplasty  · Left vertebral artery V4 segment and basilar artery partial occlusion, TICI 1  · Basilar artery thrombectomy successfully performed with residual left PCA occlusion    ? Performed and tolerated well   ? Remaining occlusion of the left PCA  · Intubated and sedated  ? Propofol 20mcg/kg/min  · CT of the head repeated on 10/07 at 1636, findings:   ? B/L occipital & cerebellar parenchymal hyperattenuation are stable   ? Cerebral chronic microangiopathic disease  ? Extensive intracranial atherosclerotic disease  ? Repeat CT of the head when 24hrs post op   · Cerebral Edema  ? Seen on CT performed 10/07  ? Resolved on CT performed 10/08  · Low dose heparin gtt (40-60 ptt)  ? Monitor PTTs  · Subacute infarct at the right occipital lobe and superior aspect of the left cerebellum with Development of hemorrhagic transformation compared to the CTA completed on 09/30  § CT cerebral perfusion  § CTA head and neck w/o contrast  · Consulting with Neurology  · Consulting with physical, occupational & speech therapy      CV:  · Hypertension:  ? Resume Home Med: Metoprolol   ? Held Phenylephrine 50mg to maintain SBP <120  ? Hydralazine PRN to maintain SBP >160  ?  SBP Goal: 120-160  · Hyperlipidemia:   § Continue atorvastatin     Lung:   · Acute Respiratory Failure  · Ventilator  · Settings: Spontaneous 14/450/40/5   · Pressure Support: 5  · F/ Vt: 38  · SPO2: 100%   · Maintain >95%     GI:  · PUD  ? Protonix Injection 40mg  · Zofran PRN  · GI Prophylaxis     FEN:   · Diet: tube feeding, Jevity 1 2 Nato   · Continue as tolerated  · I's & O's:  · I: 6487 9  · O: 4400  · Urine: 3650  · Blood: 750  · Net: 2087 9  · Monitor I's & O's  · Stop IV fluids  · Monitor and replete electrolytes as needed     :  · Cortez Catheter  · Creatinine at baseline   ? Continue to trend  · No acute issues     ID:   · Leukocytosis  ? WBC: 15 13 (9 40 on 10/07)  ? Will trend  ? Currently afebrile (98 6F)       Heme:   · Acute Blood Loss Anemia:   · Hgb: 8 1  · Maintain Hgb >7 0  · PTT: 118  · Goal: 40-60  · CT repeated at 10/07 at 1636 when goal was achieved   · Receiving Heparin      Endo:  · Hyperglycemia  ? Random Glucose: 105  ? Goal: 140-180  ? A1c: 6 2% (10/01)  ? Continue on Insulin Sliding Scale                 Msk/Skin:  · Pressure injury of the right knee   ? Consult wound care  · Frequent repositioning for pressure ulcer prophylaxis     Disposition: continue ICU monitoring      HPI/24hr events: Repeat CT of the head was completed when PTT was 67  Review of Systems:   Review of Systems   Unable to perform ROS: Intubated      Physical Exam:  Physical Exam   Constitutional: He appears well-developed and well-nourished  No distress  HENT:   Head: Normocephalic and atraumatic  Mouth/Throat: No oropharyngeal exudate  Eyes: Pupils are equal, round, and reactive to light  Conjunctivae are normal  Right eye exhibits no discharge  Left eye exhibits no discharge  No scleral icterus  Neck: No JVD present  No thyromegaly present  Cardiovascular: Normal rate, regular rhythm, normal heart sounds and intact distal pulses  No murmur heard  Pulmonary/Chest: Breath sounds normal  No respiratory distress  He has no wheezes   He has no rales    Abdominal: Soft  Bowel sounds are normal  He exhibits no distension and no mass  There is no tenderness  Musculoskeletal: He exhibits no edema  Neurological:   Reflex Scores:       Patellar reflexes are 2+ on the right side and 0 on the left side  Intubated and sedated   Skin: Skin is warm  No rash noted  No erythema  Vitals:    10/08/19 0400 10/08/19 0403 10/08/19 0500 10/08/19 0600   BP: 134/55  119/50    BP Location: Left arm      Pulse: 80 83 80    Resp: 16  16    Temp: 98 6 °F (37 °C)  98 6 °F (37 °C)    TempSrc: Rectal      SpO2: 100% 100% 100%    Weight:    77 6 kg (171 lb 1 2 oz)   Height:         Arterial Line BP: 158/38  Arterial Line MAP (mmHg): 70 mmHg    Temperature:   Temp (24hrs), Av 7 °F (36 5 °C), Min:96 4 °F (35 8 °C), Max:98 6 °F (37 °C)    Current: Temperature: 98 6 °F (37 °C)    Weights:   IBW: 73 kg    Body mass index is 24 55 kg/m²    Weight (last 2 days)     Date/Time   Weight    10/08/19 0600   77 6 (171 08)    10/07/19 0342   71 7 (158 07)              Non-Invasive/Invasive Ventilation Settings:  Respiratory    Lab Data (Last 4 hours)    None         O2/Vent Data (Last 4 hours)      10/08 0403           Vent Mode AC/VC       Resp Rate (BPM) (BPM) 14       Vt (mL) (mL) 450       FIO2 (%) (%) 40       PEEP (cmH2O) (cmH2O) 5       MV 8 61                   Intake and Outputs:  I/O       10/06 0701 - 10/07 0700 10/07 0701 - 10/08 0700    I V  (mL/kg)  5136 3 (66 2)    NG/GT  60    IV Piggyback  1000    Feedings  460    Total Intake(mL/kg)  6656 3 (85 8)    Urine (mL/kg/hr) 150 3650 (2)    Blood  750    Total Output 150 4400    Net -150 +2256 3              Nutrition:        Diet Orders   (From admission, onward)             Start     Ordered    10/07/19 1323  Diet Enteral/Parenteral; Tube Feeding No Oral Diet; Jevity 1 2 Nato; Continuous; 55  Diet effective now     Comments:  Start at 20 m/l hr, titrate by 10-20 ml/hr every 4 hours to goal    Question Answer Comment Diet Type Enteral/Parenteral    Enteral/Parenteral Tube Feeding No Oral Diet    Tube Feeding Formula: Jevity 1 2 Nato    Bolus/Cyclic/Continuous Continuous    Tube Feeding Goal Rate (mL/hr): 55    RD to adjust diet per protocol? Yes        10/07/19 1326                Labs:   Results from last 7 days   Lab Units 10/08/19  0516 10/07/19  2344 10/07/19  1412 10/07/19  1128 10/07/19  1113 10/07/19  0417  10/03/19  0453   WBC Thousand/uL 15 13*  --  9 40  --  12 52* 10 72*   < > 7 30   HEMOGLOBIN g/dL 8 1* 8 5* 9 5*  --  10 0* 12 6   < > 12 3   I STAT HEMOGLOBIN g/dl  --   --   --  8 5*  --   --   --   --    HEMATOCRIT % 24 4*  --  28 1*  --  29 9* 37 9   < > 36 3*   HEMATOCRIT, ISTAT %  --   --   --  25*  --   --   --   --    PLATELETS Thousands/uL 196  --  175  --  287 243   < > 222   NEUTROS PCT %  --   --   --   --  92* 82*  --  60   MONOS PCT %  --   --   --   --  3* 6  --  14*    < > = values in this interval not displayed      Results from last 7 days   Lab Units 10/08/19  0516 10/07/19  1412 10/07/19  1128 10/07/19  0417   SODIUM mmol/L 144 140  --  137   POTASSIUM mmol/L 3 7 4 1  --  3 9   CHLORIDE mmol/L 114* 113*  --  106   CO2 mmol/L 22 20*  --  24   CO2, I-STAT mmol/L  --   --  21  --    BUN mg/dL 15 12  --  17   CREATININE mg/dL 0 97 0 74  --  0 86   CALCIUM mg/dL 8 1* 7 4*  --  8 7   GLUCOSE, ISTAT mg/dl  --   --  157*  --      Results from last 7 days   Lab Units 10/08/19  0516 10/07/19  0417   MAGNESIUM mg/dL 2 0 2 4     Results from last 7 days   Lab Units 10/07/19  0417   PHOSPHORUS mg/dL 3 3      Results from last 7 days   Lab Units 10/08/19  0516 10/07/19  2344 10/07/19  1713 10/07/19  0417 10/06/19  1836   INR   --   --  1 23* 1 03 1 01   PTT seconds 118* 67* 28 30 30         0   Lab Value Date/Time    TROPONINI 0 11 (H) 09/30/2019 1519    TROPONINI 0 09 (H) 09/30/2019 1154    TROPONINI 0 11 (H) 09/30/2019 0825    TROPONINI <0 02 08/06/2019 1449    TROPONINI <0 02 02/06/2017 0045    TROPONINI <0 02 02/05/2017 2159    TROPONINI <0 02 02/05/2017 1806    TROPONINI <0 04 04/20/2014 0520    TROPONINI <0 04 04/19/2014 2340    TROPONINI <0 04 04/19/2014 1620       Imaging: I have personally reviewed pertinent reports  EKG: reviewed results    Micro:  Lab Results   Component Value Date    URINECX No Growth <1000 cfu/mL 12/27/2018    URINECX >100,000 cfu/ml Escherichia coli (A) 09/17/2018       Allergies:    Allergies   Allergen Reactions    Augmentin [Amoxicillin-Pot Clavulanate] GI Intolerance       Medications:   Scheduled Meds:  Current Facility-Administered Medications:  atorvastatin 40 mg Oral QPM Mao Diaz MD    chlorhexidine 15 mL Swish & Spit Q12H [address] Serena Mejia MD    heparin (porcine) 300-2,000 Units/hr Intravenous Titrated Shellie Stuart MD Last Rate: 590 Units/hr (10/08/19 0612)   hydrALAZINE 5 mg Intravenous Q6H PRN Cara , CRNP    insulin regular (HumuLIN R,NovoLIN R) infusion 0 3-21 Units/hr Intravenous Titrated Regulo Gilman MD Last Rate: 0 5 Units/hr (10/08/19 0609)   phenylephine  mcg/min Intravenous Titrated Cara , CRNP Last Rate: 50 mcg/min (10/08/19 0339)   propofol 5-50 mcg/kg/min Intravenous Titrated Cara , CRNP Last Rate: 25 mcg/kg/min (10/07/19 2222)   sodium chloride 75 mL/hr Intravenous Continuous Catalina Wilson MD Last Rate: 75 mL/hr (10/07/19 2318)     Continuous Infusions:  heparin (porcine) 300-2,000 Units/hr Last Rate: 590 Units/hr (10/08/19 0612)   insulin regular (HumuLIN R,NovoLIN R) infusion 0 3-21 Units/hr Last Rate: 0 5 Units/hr (10/08/19 0609)   phenylephine  mcg/min Last Rate: 50 mcg/min (10/08/19 0339)   propofol 5-50 mcg/kg/min Last Rate: 25 mcg/kg/min (10/07/19 2222)   sodium chloride 75 mL/hr Last Rate: 75 mL/hr (10/07/19 1592)     PRN Meds:    hydrALAZINE 5 mg Q6H PRN       VTE Pharmacologic Prophylaxis: Heparin  VTE Mechanical Prophylaxis: sequential compression device    Invasive lines and devices: Invasive Devices     Peripherally Inserted Central Catheter Line            PICC Line 27/12/49 Right Basilic less than 1 day          Peripheral Intravenous Line            Peripheral IV 10/06/19 Right Antecubital 1 day          Arterial Line            Arterial Line 10/07/19 Right Radial less than 1 day          Drain            NG/OG/Enteral Tube Nasogastric 12 Fr Left nares less than 1 day    Urethral Catheter 16 Fr  less than 1 day          Airway            ETT  Cuffed;Oral 7 5 mm less than 1 day    ETT  Cuffed;Oral;Hi-Lo 7 5 mm less than 1 day                   Counseling / Coordination of Care  Total time spent today 40 minutes  Greater than 50% of total time was spent with the patient and / or family counseling and / or coordination of care  A description of the counseling / coordination of care: Winsome Leach Code Status: Level 1 - Full Code     Portions of the record may have been created with voice recognition software  Occasional wrong word or "sound a like" substitutions may have occurred due to the inherent limitations of voice recognition software  Read the chart carefully and recognize, using context, where substitutions have occurred       Cynthia Layne
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
## 2019-10-08 NOTE — RESPIRATORY THERAPY NOTE
RT Ventilator Management Note  Dayna Chris 80 y o  male MRN: [id number]  Unit/Bed#: ICU 11 Encounter: [id number]      Daily Screen       10/7/2019  1220 10/8/2019  0755          Patient safety screen outcome[de-identified]  Failed  Passed      Not Ready for Weaning due to[de-identified]  Underline problem not resolved  Underline problem not resolved              Physical Exam:   Assessment Type: Assess only  General Appearance: Unresponsive  Respiratory Pattern: Assisted  Chest Assessment: Chest expansion symmetrical  Bilateral Breath Sounds: Clear, Diminished  Suction: ET Tube  O2 Device: vent      Resp Comments: Pt placed on minimal PS wean and doing well  Will continue to monitor

## 2019-10-08 NOTE — PROGRESS NOTES
Progress Note - Rosa Zambrano 1935, 80 y o  male MRN: [id number]    Unit/Bed#: ICU 11 Encounter: [id number]    Primary Care Provider: Moriah Shah MD   Date and time admitted to hospital: 10/7/2019  3:28 AM        CVA (cerebral vascular accident) [facility])  Assessment & Plan  POD1 basilar artery thrombectomy with residual left PCA occlusion and right vertebral artery angioplasty  TICI 2B    Imaging personally reviewed and reviewed with attending:  · 10/7/19 - CTA head and neck w/wo: 1) subacute infarct at the right occipital lobe and left superior cerebellum, with similar findings of hemorrhagic transformation concern for intraparenchymal hematoma at the left superior cerebellum  2) New findings of near complete occlusion and loss of enhancement at the left V4 segment with thrombus extending into the proximal basilar artery  There is asymmetric decreased flow through the right P2 segment and more distal branches  otherwise vessel disease throughout the cervical segment of the left vertebral artery and right V4  Segment are similar to CT examination of 9/30/2019  3) no intracranial aneurysm  No hemodynamically significant stenosis or occlusion of the major vessels of the [place]  4) Focal airspace disease at the left upper lobe, concerning for acute infiltrate, new since 9/30/2019  · 10/7/19 - CT head wo: 1) multiple infarcts identified within the posterior fossa demonstrating increased density most likely representing contrast staining  Similar findings identified within within the occipital lobes bilaterally as well as the right posterior thalamus  Superimposed petechial hemorrhage within the infarcts not excluded  · 10/7/19 - CT head wo: 1) Stable bilateral occipital and cerebellar parenchymal hyperattenuation from contrast staining  Underlying infarction is suspected as there is persistent edema and crowding of the left more than right quadrigeminal cistern   It is difficult to assess for underlying parenchymal hemorrhage  Continued surveillance imaging is recommended  · MRI brain ordered and pending  · CT head ordered for 10/8/2019  · CTA head and neck on Friday (10/11/2019)  · STAT CT head without contrast if decline in GCS >2pts/1h    Medical management:  · Prior patient was on aspirin and plavix for prior stroke, received DDAVP  · Due to the hemorrhage, patient was not a candidate for tPA  · Neurology following for stroke management  · MAP >65   · SBP goal 120-160  · Going off the cuff pressures, a-line not reliable  · Xander currently on hold due to elevated BP  · On a low dose heparin drip, non-bolus with PTT goal 40-60  ·   · PTT supratherapeutic, CT ordered due to high risk of hemorrhage  · Hgb goal is greater than or equal to 9 0  · Currently 8 1  · Received 2U PRBCs yesterday  · WBC 15 13, afebrile   · Sedation: propofol    Essential hypertension  Assessment & Plan  · SBP goal 120-160    * Hemorrhagic stroke [facility])  Assessment & Plan  · CTA with hemorrhagic conversion in prior stroke territory  Subjective/Objective   Chief Complaint: follow up on thrombectomy    Subjective: patient s/p thrombectomy and angioplasty  He was placed on a non-bolus heparin drip with a goal PTT 40-60   His PTT this morning was 118    Objective: intubated    I/O       10/06 0701 - 10/07 0700 10/07 0701 - 10/08 0700 10/08 0701 - 10/09 0700    I V  (mL/kg)  5187 4 (66 8)     NG/GT  60     IV Piggyback  1000     Feedings  570     Total Intake(mL/kg)  6817 4 (87 9)     Urine (mL/kg/hr) 150 3775 (2)     Blood  750     Total Output 150 4525     Net -150 +2292 4                  Invasive Devices     Peripherally Inserted Central Catheter Line            PICC Line 82/91/54 Right Basilic less than 1 day          Peripheral Intravenous Line            Peripheral IV 10/06/19 Right Antecubital 1 day          Arterial Line            Arterial Line 10/07/19 Right Radial 1 day          Drain            Urethral Catheter 16 Fr  1 day    NG/OG/Enteral Tube Nasogastric 12 Fr Left nares less than 1 day          Airway            ETT  Cuffed;Oral 7 5 mm 1 day    ETT  Cuffed;Oral;Hi-Lo 7 5 mm less than 1 day                Physical Exam:  Vitals: Blood pressure 117/51, pulse 98, temperature 98 6 °F (37 °C), resp  rate 18, height 5' 10" (1 778 m), weight 77 6 kg (171 lb 1 2 oz), SpO2 100 %  ,Body mass index is 24 55 kg/m²  General appearance: intubated, appears stated age, and no distress  Head: Normocephalic, without obvious abnormality, atraumatic  Eyes: opens eyes to voice  Dysconjugate gaze  Left pupil 0 5mm larger than right  Reactive b/l  Lungs: intubated, on SBT  Heart: regular heart rate  Extremity: right groin dressing is dry, clear and intact  Area is soft, no obvious hematoma  Pedal pulses 1+  Skin is warm  Neurologic:   Mental status: GCS 8T (3E, 4M, 1VT)  Cranial nerves: dysconjugate gaze, pupils reactive bilaterally, no appreciated facial asymmetry  Motor: does not follow commands, intermittent reflexive right   Withdrawals in BLE briskly to painful stimuli, no movement in LUE but appreciate facial grimacing   Reflexes: 3+ brisk and symmetric  negative bowen, negative clonus    Lab Results:  Results from last 7 days   Lab Units 10/08/19  0516 10/07/19  2344 10/07/19  1412 10/07/19  1128 10/07/19  1113 10/07/19  0417  10/03/19  0453   WBC Thousand/uL 15 13*  --  9 40  --  12 52* 10 72*   < > 7 30   HEMOGLOBIN g/dL 8 1* 8 5* 9 5*  --  10 0* 12 6   < > 12 3   I STAT HEMOGLOBIN g/dl  --   --   --  8 5*  --   --   --   --    HEMATOCRIT % 24 4*  --  28 1*  --  29 9* 37 9   < > 36 3*   HEMATOCRIT, ISTAT %  --   --   --  25*  --   --   --   --    PLATELETS Thousands/uL 196  --  175  --  287 243   < > 222   NEUTROS PCT %  --   --   --   --  92* 82*  --  60   MONOS PCT %  --   --   --   --  3* 6  --  14*    < > = values in this interval not displayed       Results from last 7 days   Lab Units 10/08/19  0570 10/07/19  1412 10/07/19  1128 10/07/19  0417   POTASSIUM mmol/L 3 7 4 1  --  3 9   CHLORIDE mmol/L 114* 113*  --  106   CO2 mmol/L 22 20*  --  24   CO2, I-STAT mmol/L  --   --  21  --    BUN mg/dL 15 12  --  17   CREATININE mg/dL 0 97 0 74  --  0 86   CALCIUM mg/dL 8 1* 7 4*  --  8 7   GLUCOSE, ISTAT mg/dl  --   --  157*  --      Results from last 7 days   Lab Units 10/08/19  0516 10/07/19  0417   MAGNESIUM mg/dL 2 0 2 4     Results from last 7 days   Lab Units 10/07/19  0417   PHOSPHORUS mg/dL 3 3     Results from last 7 days   Lab Units 10/08/19  0516 10/07/19  2344 10/07/19  1713 10/07/19  0417 10/06/19  1836   INR   --   --  1 23* 1 03 1 01   PTT seconds 118* 67* 28 30 30     No results found for: TROPONINT  ABG:No results found for: PHART, TEC2LYS, PO2ART, UCV9USM, X7XDCQAA, BEART, SOURCE    Imaging Studies: I have personally reviewed pertinent reports  and I have personally reviewed pertinent films in PACS  CT head wo contrast   Final Result      Stable bilateral occipital and cerebellar parenchymal hyperattenuation from contrast staining  Underlying infarction is suspected as there is persistent edema and crowding of the left more than right quadrigeminal cistern  It is difficult to assess for    underlying parenchymal hemorrhage  Continued surveillance imaging is recommended  Cerebral chronic microangiopathic disease  Extensive intracranial atherosclerotic disease  Workstation performed: [id number]         XR chest portable ICU   Final Result      No acute cardiopulmonary disease  ET tube 6 cm above the carlos  Workstation performed: [id number]         CT head wo contrast   Final Result      Multiple infarcts identified within the posterior fossa demonstrating increased density most likely representing contrast staining  Similar findings identified within the occipital lobes bilaterally as well as the right posterior thalamus    Superimposed    petechial hemorrhage within the infarcts not excluded  Extensive vascular calcification of the distal vertebral arteries and basilar artery  I personally discussed this study with Celso Avila on 10/7/2019 at 1:26 PM                            Workstation performed: [id number]         CT cerebral perfusion   Final Result      Early infarcts are identified within the occipital lobes and posterior fossa bilaterally with interval development of cortical hyperdensity present within both occipital lobes as well as the left superior cerebellum  This hyperdensity is consistent with    early cortical petechial hemorrhage  CT perfusion performed  Data available on PACS  Workstation performed: [id number]         CTA head and neck w wo contrast   Final Result      1  Subacute infarct at the right occipital lobe and left superior cerebellum, with similar findings of hemorrhagic transformation concern for intraparenchymal hematoma at the left superior cerebellum  2  New finding of near complete occlusion and loss of enhancement at the left V4 segment with thrombus extending into the proximal basilar artery  There is asymmetric decreased flow throughout the right P2 segment and more distal branches  Otherwise    vessel disease throughout the cervical segment of the left vertebral artery and right V4 segment are similar to CT examination of 9/30/2019  3   No intracranial aneurysm  No hemodynamically significant stenosis or occlusion of the major vessels of the [place] of [place]  4  Focal airspace disease at the left upper lobe, concerning for acute infiltrate, new since 9/30/2019  Findings discussed with Dr Sarmad Thorne at 6:45 AM, 10/7/2019  Workstation performed: [id number]         CT head wo contrast   Final Result      Subacute infarcts at the right occipital lobe and superior aspect left cerebellum are again seen    Similar evidence of hemorrhagic change at the left superior cerebellum, concerning for intraparenchymal hematoma  MR brain may be obtained for further    evaluation  Moderate microvascular ischemic      Findings were discussed with Dr Orville Horvath at 6:45 AM, 10/7/2019                     Workstation performed: [id number]         IR cerebral angiography / intervention    (Results Pending)   MRI inpatient order    (Results Pending)   CT head wo contrast    (Results Pending)         EKG, Pathology, and Other Studies: I have personally reviewed pertinent reports        VTE  Prophylaxis: Sequential compression device (Venodyne)  and Heparin

## 2019-10-08 NOTE — RESPIRATORY THERAPY NOTE
RT Ventilator Management Note  Dayna Chris 80 y o  male MRN: [id number]  Unit/Bed#: ICU 11 Encounter: [id number]      Daily Screen       10/7/2019  1220 10/8/2019  2095          Patient safety screen outcome[de-identified]  Failed  Passed      Not Ready for Weaning due to[de-identified]  Underline problem not resolved  Underline problem not resolved              Physical Exam:   Assessment Type: (P) Assess only  General Appearance: (P) Sedated  Respiratory Pattern: (P) Assisted  Chest Assessment: (P) Chest expansion symmetrical, Trachea midline  Bilateral Breath Sounds: (P) Clear, Diminished  R Breath Sounds: (P) Clear  L Breath Sounds: (P) Clear  Suction: ET Tube  O2 Device: vent      Resp Comments: (P) Pt remains on psv and is tolerating well  VS are stable and pt appears comfortable  Will continue to monitor

## 2019-10-08 NOTE — ASSESSMENT & PLAN NOTE
POD1 basilar artery thrombectomy with residual left PCA occlusion and right vertebral artery angioplasty  TICI 2B    Imaging personally reviewed and reviewed with attending:  · 10/7/19 - CTA head and neck w/wo: 1) subacute infarct at the right occipital lobe and left superior cerebellum, with similar findings of hemorrhagic transformation concern for intraparenchymal hematoma at the left superior cerebellum  2) New findings of near complete occlusion and loss of enhancement at the left V4 segment with thrombus extending into the proximal basilar artery  There is asymmetric decreased flow through the right P2 segment and more distal branches  otherwise vessel disease throughout the cervical segment of the left vertebral artery and right V4  Segment are similar to CT examination of 9/30/2019  3) no intracranial aneurysm  No hemodynamically significant stenosis or occlusion of the major vessels of the [place]  4) Focal airspace disease at the left upper lobe, concerning for acute infiltrate, new since 9/30/2019  · 10/7/19 - CT head wo: 1) multiple infarcts identified within the posterior fossa demonstrating increased density most likely representing contrast staining  Similar findings identified within within the occipital lobes bilaterally as well as the right posterior thalamus  Superimposed petechial hemorrhage within the infarcts not excluded  · 10/7/19 - CT head wo: 1) Stable bilateral occipital and cerebellar parenchymal hyperattenuation from contrast staining  Underlying infarction is suspected as there is persistent edema and crowding of the left more than right quadrigeminal cistern  It is difficult to assess for underlying parenchymal hemorrhage  Continued surveillance imaging is recommended     · MRI brain ordered and pending  · CT head ordered for 10/8/2019  · CTA head and neck on Friday (10/11/2019)  · STAT CT head without contrast if decline in GCS >2pts/1h    Medical management:  · Prior patient was on aspirin and plavix for prior stroke, received DDAVP  · Due to the hemorrhage, patient was not a candidate for tPA  · Neurology following for stroke management  · MAP >65   · SBP goal 120-160  · Going off the cuff pressures, a-line not reliable  · Xander currently on hold due to elevated BP  · On a low dose heparin drip, non-bolus with PTT goal 40-60  ·   · PTT supratherapeutic, CT ordered due to high risk of hemorrhage  · Hgb goal is greater than or equal to 9 0  · Currently 8 1  · Received 2U PRBCs yesterday  · WBC 15 13, afebrile   · Sedation: propofol
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
## 2019-10-08 NOTE — OCCUPATIONAL THERAPY NOTE
OT CANCEL NOTE    OT orders received  Chart reviewed  Pt is currently intubated & unresponsive, not appropriate to engage in skilled OT services at this time  Will hold initial OT evaluation  Will continue to follow pt on caseload and see pt when medically stable and as clinically appropriate      Roxana MAYO, OTR/L

## 2019-10-08 NOTE — PLAN OF CARE
Problem: Nutrition/Hydration-ADULT  Goal: Nutrient/Hydration intake appropriate for improving, restoring or maintaining nutritional needs  Description  Monitor and assess patient's nutrition/hydration status for malnutrition  Collaborate with interdisciplinary team and initiate plan and interventions as ordered  Monitor patient's weight and dietary intake as ordered or per policy  Utilize nutrition screening tool and intervene as necessary  Determine patient's food preferences and provide high-protein, high-caloric foods as appropriate       INTERVENTIONS:  - Monitor oral intake, urinary output, labs, and treatment plans  - Assess nutrition and hydration status and recommend course of action  - Evaluate amount of meals eaten  - Assist patient with eating if necessary   - Allow adequate time for meals  - Recommend/ encourage appropriate diets, oral nutritional supplements, and vitamin/mineral supplements  - Order, calculate, and assess calorie counts as needed  - Recommend, monitor, and adjust tube feedings and TPN/PPN based on assessed needs  - Assess need for intravenous fluids  - Provide specific nutrition/hydration education as appropriate  - Include patient/family/caregiver in decisions related to nutrition  Outcome: Progressing     Problem: PAIN - ADULT  Goal: Verbalizes/displays adequate comfort level or baseline comfort level  Description  Interventions:  - Encourage patient to monitor pain and request assistance  - Assess pain using appropriate pain scale  - Administer analgesics based on type and severity of pain and evaluate response  - Implement non-pharmacological measures as appropriate and evaluate response  - Consider cultural and social influences on pain and pain management  - Notify physician/advanced practitioner if interventions unsuccessful or patient reports new pain  Outcome: Progressing     Problem: INFECTION - ADULT  Goal: Absence or prevention of progression during hospitalization  Description  INTERVENTIONS:  - Assess and monitor for signs and symptoms of infection  - Monitor lab/diagnostic results  - Monitor all insertion sites, i e  indwelling lines, tubes, and drains  - Monitor endotracheal if appropriate and nasal secretions for changes in amount and color  - Floris appropriate cooling/warming therapies per order  - Administer medications as ordered  - Instruct and encourage patient and family to use good hand hygiene technique  - Identify and instruct in appropriate isolation precautions for identified infection/condition  Outcome: Progressing     Problem: SAFETY ADULT  Goal: Patient will remain free of falls  Description  INTERVENTIONS:  - Assess patient frequently for physical needs  -  Identify cognitive and physical deficits and behaviors that affect risk of falls    -  Floris fall precautions as indicated by assessment   - Educate patient/family on patient safety including physical limitations  - Instruct patient to call for assistance with activity based on assessment  - Modify environment to reduce risk of injury  - Consider OT/PT consult to assist with strengthening/mobility  Outcome: Progressing     Problem: DISCHARGE PLANNING  Goal: Discharge to home or other facility with appropriate resources  Description  INTERVENTIONS:  - Identify barriers to discharge w/patient and caregiver  - Arrange for needed discharge resources and transportation as appropriate  - Identify discharge learning needs (meds, wound care, etc )  - Arrange for interpretive services to assist at discharge as needed  - Refer to Case Management Department for coordinating discharge planning if the patient needs post-hospital services based on physician/advanced practitioner order or complex needs related to functional status, cognitive ability, or social support system  Outcome: Progressing     Problem: Knowledge Deficit  Goal: Patient/family/caregiver demonstrates understanding of disease process, treatment plan, medications, and discharge instructions  Description  Complete learning assessment and assess knowledge base  Interventions:  - Provide teaching at level of understanding  - Provide teaching via preferred learning methods  Outcome: Progressing     Problem: Neurological Deficit  Goal: Neurological status is stable or improving  Description  Interventions:  - Monitor and assess patient's level of consciousness, motor function, sensory function, and level of assistance needed for ADLs  - Monitor and report changes from baseline  Collaborate with interdisciplinary team to initiate plan and implement interventions as ordered  - Provide and maintain a safe environment  - Consider seizure precautions  - Consider fall precautions  - Consider aspiration precautions  - Consider bleeding precautions  Outcome: Progressing     Problem: Activity Intolerance/Impaired Mobility  Goal: Mobility/activity is maintained at optimum level for patient  Description  Interventions:  - Assess and monitor patient  barriers to mobility and need for assistive/adaptive devices  - Assess patient's emotional response to limitations  - Collaborate with interdisciplinary team and initiate plans and interventions as ordered  - Encourage independent activity per ability   - Maintain proper body alignment  - Perform active/passive rom as tolerated/ordered  - Plan activities to conserve energy   - Turn patient as appropriate  Outcome: Progressing     Problem: Communication Impairment  Goal: Ability to express needs and understand communication  Description  Assess patient's communication skills and ability to understand information  Patient will demonstrate use of effective communication techniques, alternative methods of communication and understanding even if not able to speak  - Encourage communication and provide alternate methods of communication as needed    - Collaborate with case management/social services for discharge needs  - Include patient/family/caregiver in decisions related to communication  Outcome: Progressing     Problem: Potential for Aspiration  Goal: Non-ventilated patient's risk of aspiration is minimized  Description  Assess and monitor vital signs, respiratory status, and labs (WBC)  Monitor for signs of aspiration (tachypnea, cough, rales, wheezing, cyanosis, fever)  - Assess and monitor patient's ability to swallow  - Place patient up in chair to eat if possible  - HOB up at 90 degrees to eat if unable to get patient up into chair   - Supervise patient during oral intake  - Instruct patient/ family to take small bites  - Instruct patient/ family to take small single sips when taking liquids  - Follow patient-specific strategies generated by speech pathologist   Outcome: Progressing     Problem: Nutrition  Goal: Nutrition/Hydration status is improving  Description  Monitor and assess patient's nutrition/hydration status for malnutrition (ex- brittle hair, bruises, dry skin, pale skin and conjunctiva, muscle wasting, smooth red tongue, and disorientation)  Collaborate with interdisciplinary team and initiate plan and interventions as ordered  Monitor patient's weight and dietary intake as ordered or per policy  Utilize nutrition screening tool and intervene per policy  Determine patient's food preferences and provide high-protein, high-caloric foods as appropriate  - Assist patient with eating   - Allow adequate time for meals   - Encourage patient to take dietary supplement as ordered  - Collaborate with clinical nutritionist   - Include patient/family/caregiver in decisions related to nutrition    Outcome: Progressing     Problem: Prexisting or High Potential for Compromised Skin Integrity  Goal: Skin integrity is maintained or improved  Description  INTERVENTIONS:  - Identify patients at risk for skin breakdown  - Assess and monitor skin integrity  - Assess and monitor nutrition and hydration status  - Monitor labs   - Assess for incontinence   - Turn and reposition patient  - Assist with mobility/ambulation  - Relieve pressure over bony prominences  - Avoid friction and shearing  - Provide appropriate hygiene as needed including keeping skin clean and dry  - Evaluate need for skin moisturizer/barrier cream  - Collaborate with interdisciplinary team   - Patient/family teaching  - Consider wound care consult   Outcome: Progressing     Problem: SAFETY,RESTRAINT: NV/NON-SELF DESTRUCTIVE BEHAVIOR  Goal: Remains free of harm/injury (restraint for non violent/non self-detsructive behavior)  Description  INTERVENTIONS:  - Instruct patient/family regarding restraint use   - Assess and monitor physiologic and psychological status   - Provide interventions and comfort measures to meet assessed patient needs   - Identify and implement measures to help patient regain control  - Assess readiness for release of restraint   Outcome: Progressing  Goal: Returns to optimal restraint-free functioning  Description  INTERVENTIONS:  - Assess the patient's behavior and symptoms that indicate continued need for restraint  - Identify and implement measures to help patient regain control  - Assess readiness for release of restraint   Outcome: Progressing

## 2019-10-09 NOTE — PROGRESS NOTES
10/09/19 1400   Jainism Encounters   Jainism Needs Prayer  ( blessing)   Sacramental Encounters   Sacrament of Sick-Anointing Anointed   Sacrament Other Other (Comment)  (final commadation)

## 2019-10-09 NOTE — PROGRESS NOTES
Daughter asking for detailed explanation as to why repeat CT was ordered post MRI  CCM resident was brought to bedside to give full explanation to daughter who only wanted to speak to a doctor  CCM at bedside several times from 9pm to 1am to have "talks " with daughter extensively on topics that were fully covered with her on multiple attempts

## 2019-10-09 NOTE — RESPIRATORY THERAPY NOTE
RT Ventilator Management Note  Carolann Melara 80 y o  male MRN: [id number]  Unit/Bed#: ICU 11 Encounter: [id number]      Daily Screen       10/8/2019  0755 10/9/2019  0832          Patient safety screen outcome[de-identified]  Passed  Passed      Not Ready for Weaning due to[de-identified]  Underline problem not resolved  Underline problem not resolved      Spont breathing trial outcome[de-identified]          RSBI:    84              Physical Exam:   Assessment Type: Assess only  General Appearance: Eyes do not open to any stimulus  Respiratory Pattern: Assisted  Chest Assessment: Chest expansion symmetrical, Trachea midline  Bilateral Breath Sounds: Clear, Diminished  Suction: ET Tube      Resp Comments: Pt resting comfortably on PS  Eyes do not open to stimulus  Will reamain on PS and will follow up

## 2019-10-09 NOTE — RESPIRATORY THERAPY NOTE
RT Ventilator Management Note  Anaya Guerin 80 y o  male MRN: [id number]  Unit/Bed#: ICU 11 Encounter: [id number]      Daily Screen       10/7/2019  1220 10/8/2019  0755          Patient safety screen outcome[de-identified]  Failed  Passed      Not Ready for Weaning due to[de-identified]  Underline problem not resolved  Underline problem not resolved              Physical Exam:   Assessment Type: (P) Assess only  General Appearance: Sedated  Respiratory Pattern: Assisted  Chest Assessment: Chest expansion symmetrical, Trachea midline  Bilateral Breath Sounds: (P) Clear  R Breath Sounds: Clear  L Breath Sounds: Clear      Resp Comments: (P) Pt  doing well on CPAP with no increased WOB or SOB  No changes at this time

## 2019-10-09 NOTE — PROGRESS NOTES
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 Left sided hemiparesis & hemineglect  · S/P Angiogram w/ Basilar Artery Thrombectomy (10/7)  ? Performed and tolerated well   ? Remaining occlusion of the left PCA  · Intubated and sedated  ? Propofol 15 mcg/kg/min  · CT of the head repeated on 10/08, findings:   ? Findings were similar to prior CT results  ? Continued with heparin gtt  · MRI of the head, findings:  ? Multifocal acute/recent bihemispheric supratentorial and infratentorial infarcts  ? Showed progression of original findings  ? Hemorrhagic conversion n  ? Partial effacement of the 4th ventricle w/o natacha hydrocephalus  · Low dose heparin gtt (40-60 ptt)  ? Monitor PTTs  · Subacute infarct at the right occipital lobe and superior aspect of the left cerebellum with Development of hemorrhagic transformation compared to the CTA completed on 09/30  ? Consulting with Neurology  ? Consulting with physical, occupational & speech therapy      CV:  · Hypertension:  ? Continue on Metoprolol   ? Phenylephrine 50mg to maintain SBP <120  ? Hydralazine PRN to maintain SBP >160   ? SBP Goal: 120-160  · Hyperlipidemia:   § Continue atorvastatin     Lung:   · Acute Respiratory Failure  § CPAP/PSV  § FIO2: 40%  § PEEP: 5  § Pressure Support: 5  ? SPO2: 97%   § Maintain >95%     GI:  · Zofran PRN  · GI Prophylaxis     FEN:   · Diet: tube feeding, Jevity 1 2 Nato & 1 PROSOURCE  ? Jevity: Advance to goal rate of 60ml/hr  ? Check ionized Calcium  · I's & O's:  ? I: 2390 8  ? O: 2850  ? Net: 740 8  ?  Monitor I's & O's  · Stop IV fluids  · Monitor and replete electrolytes as needed     :  · Cortez Catheter  · Creatinine at baseline   ? Continue to trend  · No acute issues     ID:   · Leukocytosis  ? WBC: 11 80 (15 13 on 10/08)  ? Will trend  ? Low grade fever: 100F      Heme:   · Acute Blood Loss Anemia:   ? Hgb: 8 2  ? Maintain Hgb >7 0  · PTT: 45  ? Goal: 40-60  · Continue Heparin       Endo:  · Hyperglycemia  ? Random Glucose: 170  § Goal: 140-180  ? A1c: 6 2% (10/01)  ? Continue on Insulin gtt- SSI                Msk/Skin:  · Pressure injury of the right knee   ? Consult wound care  · Frequent repositioning for pressure ulcer prophylaxis     Disposition: Continue ICU monitoring  HPI/24hr events: MRI completed on 10/07 showed new hemorrhages  Repeat CT showed no hemorrhagic findings and was most similar to CT performed on 10/08  Continued heparin as a result of no new hemorrhages seen on CT performed on 10/08  Review of Systems:  Review of Systems   Unable to perform ROS: Other       Physical Exam:  Physical Exam   Constitutional: He appears well-developed and well-nourished  No distress  HENT:   Head: Normocephalic and atraumatic  Nose: Nose normal    Eyes: Pupils are equal, round, and reactive to light  Conjunctivae are normal  Right eye exhibits no discharge  Left eye exhibits no discharge  No scleral icterus  Eyes are sluggish and slow to react to light   Neck: Neck supple  Cardiovascular: Normal rate, regular rhythm, normal heart sounds and intact distal pulses  Pulmonary/Chest: Effort normal and breath sounds normal  No stridor  He has no wheezes  He has no rales  Abdominal: Soft  Bowel sounds are normal  He exhibits no distension and no mass  There is no tenderness  No hernia  Musculoskeletal: He exhibits no edema, tenderness or deformity  Neurological: He displays abnormal reflex  Sedated  +1 DTR on left patellar  +2 DTR on right patellar   Skin: Skin is warm   Capillary refill takes less than 2 seconds  No rash noted  No erythema  Vitals reviewed  Vitals:    10/09/19 0414 10/09/19 0500 10/09/19 0551 10/09/19 0600   BP:  139/64  150/60   BP Location:       Pulse:  96  100   Resp:  (!) 28  (!) 29   Temp:  99 7 °F (37 6 °C)  100 °F (37 8 °C)   TempSrc:       SpO2: 98% 99%  97%   Weight:   78 2 kg (172 lb 6 4 oz)    Height:         Arterial Line BP: 214/50  Arterial Line MAP (mmHg): 88 mmHg    Temperature:   Temp (24hrs), Av 5 °F (37 5 °C), Min:97 9 °F (36 6 °C), Max:101 1 °F (38 4 °C)    Current: Temperature: 100 °F (37 8 °C)    Weights:   IBW: 73 kg    Body mass index is 24 74 kg/m²    Weight (last 2 days)     Date/Time   Weight    10/09/19 0551   78 2 (172 4)    10/08/19 1447   77 6 (171 08)    10/08/19 0600   77 6 (171 08)    10/07/19 0342   71 7 (158 07)               Non-Invasive/Invasive Ventilation Settings:  Respiratory    Lab Data (Last 4 hours)    None         O2/Vent Data (Last 4 hours)      10/09 0414           Vent Mode CPAP/PS Spont       FIO2 (%) (%) 40       PEEP (cmH2O) (cmH2O) 5       Pressure Support (cmH2O) (cmH20) 5       MV (Obs) 8 5       RSBI 97                    Intake and Outputs:  I/O       10/07 0701 - 10/08 0700 10/08 0701 - 10/09 07    I V  (mL/kg) 5187 4 (66 8) 1170 8 (15)    NG/GT 60 120    IV Piggyback 1000     Feedings 570 1100    Total Intake(mL/kg) 6817 4 (87 9) 2390 8 (30 6)    Urine (mL/kg/hr) 3775 (2) 1650 (0 9)    Blood 750     Total Output 4525 1650    Net +2292 4 +740 8                Nutrition:        Diet Orders   (From admission, onward)             Start     Ordered    10/07/19 1323  Diet Enteral/Parenteral; Tube Feeding No Oral Diet; Jevity 1 2 Nato; Continuous; 55  Diet effective now     Comments:  Start at 20 m/l hr, titrate by 10-20 ml/hr every 4 hours to goal    Question Answer Comment   Diet Type Enteral/Parenteral    Enteral/Parenteral Tube Feeding No Oral Diet    Tube Feeding Formula: Jevity 1 2 Nato    Bolus/Cyclic/Continuous Continuous Tube Feeding Goal Rate (mL/hr): 55    RD to adjust diet per protocol? Yes        10/07/19 1326                Labs:   Results from last 7 days   Lab Units 10/09/19  0514 10/08/19  0516 10/07/19  2344 10/07/19  1412  10/07/19  1113 10/07/19  0417   WBC Thousand/uL 11 80* 15 13*  --  9 40  --  12 52* 10 72*   HEMOGLOBIN g/dL 8 2* 8 1* 8 5* 9 5*  --  10 0* 12 6   I STAT HEMOGLOBIN   --   --   --   --    < >  --   --    HEMATOCRIT % 24 7* 24 4*  --  28 1*  --  29 9* 37 9   HEMATOCRIT, ISTAT   --   --   --   --    < >  --   --    PLATELETS Thousands/uL 205 196  --  175  --  287 243   NEUTROS PCT % 77*  --   --   --   --  92* 82*   MONOS PCT % 10  --   --   --   --  3* 6    < > = values in this interval not displayed  Results from last 7 days   Lab Units 10/09/19  0514 10/08/19  0516 10/07/19  1412 10/07/19  1128   SODIUM mmol/L 140 144 140  --    POTASSIUM mmol/L 3 8 3 7 4 1  --    CHLORIDE mmol/L 109* 114* 113*  --    CO2 mmol/L 24 22 20*  --    CO2, I-STAT mmol/L  --   --   --  21   BUN mg/dL 13 15 12  --    CREATININE mg/dL 0 82 0 97 0 74  --    CALCIUM mg/dL 8 2* 8 1* 7 4*  --    GLUCOSE, ISTAT mg/dl  --   --   --  157*     Results from last 7 days   Lab Units 10/09/19  0514 10/08/19  0516 10/07/19  0417   MAGNESIUM mg/dL 2 2 2 0 2 4     Results from last 7 days   Lab Units 10/07/19  0417   PHOSPHORUS mg/dL 3 3      Results from last 7 days   Lab Units 10/08/19  1858 10/08/19  1235 10/08/19  0516  10/07/19  1713 10/07/19  0417 10/06/19  1836   INR   --   --   --   --  1 23* 1 03 1 01   PTT seconds 45* 43* 118*   < > 28 30 30    < > = values in this interval not displayed           0   Lab Value Date/Time    TROPONINI 0 11 (H) 09/30/2019 1519    TROPONINI 0 09 (H) 09/30/2019 1154    TROPONINI 0 11 (H) 09/30/2019 0825    TROPONINI <0 02 08/06/2019 1449    TROPONINI <0 02 02/06/2017 0045    TROPONINI <0 02 02/05/2017 2159    TROPONINI <0 02 02/05/2017 1806    TROPONINI <0 04 04/20/2014 0520    TROPONINI <0 04 04/19/2014 2340    TROPONINI <0 04 04/19/2014 1620       Imaging: I have personally reviewed pertinent reports  Micro:  Lab Results   Component Value Date    URINECX No Growth <1000 cfu/mL 12/27/2018    URINECX >100,000 cfu/ml Escherichia coli (A) 09/17/2018       Allergies: Allergies   Allergen Reactions    Augmentin [Amoxicillin-Pot Clavulanate] GI Intolerance       Medications:   Scheduled Meds:  Current Facility-Administered Medications:  atorvastatin 40 mg Oral QPM Zak Ríos MD    chlorhexidine 15 mL [employer] Q12H [address] Reji Naqvi MD    heparin (porcine) 300-2,000 Units/hr Intravenous Titrated Mady Jurado MD Last Rate: 590 Units/hr (10/08/19 1657)   hydrALAZINE 5 mg Intravenous Q6H PRN CHAD Donaldson    insulin regular (HumuLIN R,NovoLIN R) infusion 0 3-21 Units/hr Intravenous Titrated Guy Colunga MD Last Rate: 1 Units/hr (10/09/19 0200)   metoprolol tartrate 25 mg Oral Q12H [address] [place]    phenylephine  mcg/min Intravenous Titrated Satya Bob CRNP Last Rate: Stopped (10/08/19 0800)   propofol 5-50 mcg/kg/min Intravenous Titrated Satya Bob CRNP Last Rate: 40 mcg/kg/min (10/09/19 0529)     Continuous Infusions:  heparin (porcine) 300-2,000 Units/hr Last Rate: 590 Units/hr (10/08/19 1657)   insulin regular (HumuLIN R,NovoLIN R) infusion 0 3-21 Units/hr Last Rate: 1 Units/hr (10/09/19 0200)   phenylephine  mcg/min Last Rate: Stopped (10/08/19 0800)   propofol 5-50 mcg/kg/min Last Rate: 40 mcg/kg/min (10/09/19 0529)     PRN Meds:    hydrALAZINE 5 mg Q6H PRN       VTE Pharmacologic Prophylaxis: Heparin  VTE Mechanical Prophylaxis: sequential compression device    Invasive lines and devices:   Invasive Devices     Peripherally Inserted Central Catheter Line            PICC Line 79/05/31 Right Basilic 1 day          Peripheral Intravenous Line            Peripheral IV 10/06/19 Right Antecubital 2 days          Arterial Line Arterial Line 10/07/19 Right Radial 1 day          Drain            NG/OG/Enteral Tube Nasogastric 12 Fr Left nares 1 day    Urethral Catheter 16 Fr  1 day          Airway            ETT  Cuffed;Oral 7 5 mm 1 day    ETT  Cuffed;Oral;Hi-Lo 7 5 mm 1 day                   Counseling / Coordination of Care  Total Critical Care time spent 30 minutes excluding procedures, teaching and family updates  Code Status: Level 1 - Full Code     Portions of the record may have been created with voice recognition software  Occasional wrong word or "sound a like" substitutions may have occurred due to the inherent limitations of voice recognition software  Read the chart carefully and recognize, using context, where substitutions have occurred       Cynthia Layne

## 2019-10-09 NOTE — SOCIAL WORK
Called by RN to s/w family requesting to s/w CM  Met with pt's daughter Jacques Pedraza, her  Tyson Ahumada & pt's son Liliana Petreson with charge RN, Jose Carlos Hernadez to help answer questions about comfort care  Family just met with physicians & are deciding on possible comfort care

## 2019-10-09 NOTE — PROGRESS NOTES
Pastoral Care Progress Note    10/8/2019  Patient: Kelly Berry : 1935  Admission Date & Time: 10/7/2019 0328  MRN: [id number] [facility]: [id number]                     Chaplaincy Interventions Utilized:   Empowerment: Provided anxiety containment    Exploration: Explored spiritual needs & resources    Collaboration: Facilitated respect for spiritual/cultural practice during hospitalization    Relationship Building: Listened empathically    Ritual: Provided prayer            Chaplaincy Outcomes Achieved:  Expressed gratitude          Spiritual Coping Strategies Utilized:   Spiritual comfort

## 2019-10-09 NOTE — PHYSICAL THERAPY NOTE
Physical Therapy Cancellation Note    PT orders received, chart review performed  Pt is currently intubated/sedated and is not appropriate for active participation in PT evaluation  PT to hold evaluation, continue to follow       Kristin Wolf PT, DPT

## 2019-10-09 NOTE — PROGRESS NOTES
Pastoral Care Progress Note    10/9/2019  Patient: Kirsten Schultz : 1935  Admission Date & Time: 10/7/2019 0328  MRN: [id number] CSN: [id number]                     Chaplaincy Interventions Utilized:   Empowerment: Encouraged focus on present    Exploration: Explored emotional needs & resources, Explored relational needs & resources and Explored spiritual needs & resources    Collaboration: Consulted with interdisciplinary team    Relationship Building: Cultivated a relationship of care and support and Listened empathically    Ritual: Provided Tenriism resources      Asked Slava aLrios to talk with son of pt      10/09/19 1300   Spiritual Beliefs/Perceptions   [address]

## 2019-10-09 NOTE — PROGRESS NOTES
Progress Note - Carol Nixon 1935, 80 y o  male MRN: [id number]    Unit/Bed#: ICU 11 Encounter: [id number]    Primary Care Provider: Romina Dutton MD   Date and time admitted to hospital: 10/7/2019  3:28 AM        CVA (cerebral vascular accident) [facility])  Assessment & Plan  POD2 basilar artery thrombectomy with residual left PCA occlusion and right vertebral artery angioplasty  TICI 2B    Imaging personally reviewed and reviewed with attending:  · 10/7/19 - CTA head and neck w/wo: 1) subacute infarct at the right occipital lobe and left superior cerebellum, with similar findings of hemorrhagic transformation concern for intraparenchymal hematoma at the left superior cerebellum  2) New findings of near complete occlusion and loss of enhancement at the left V4 segment with thrombus extending into the proximal basilar artery  There is asymmetric decreased flow through the right P2 segment and more distal branches  otherwise vessel disease throughout the cervical segment of the left vertebral artery and right V4  Segment are similar to CT examination of 9/30/2019  3) no intracranial aneurysm  No hemodynamically significant stenosis or occlusion of the major vessels of the [place] of [place]  4) Focal airspace disease at the left upper lobe, concerning for acute infiltrate, new since 9/30/2019  · 10/7/19 - CT head wo: 1) multiple infarcts identified within the posterior fossa demonstrating increased density most likely representing contrast staining  Similar findings identified within within the occipital lobes bilaterally as well as the right posterior thalamus  Superimposed petechial hemorrhage within the infarcts not excluded  · 10/7/19 - CT head wo: 1) Stable bilateral occipital and cerebellar parenchymal hyperattenuation from contrast staining  Underlying infarction is suspected as there is persistent edema and crowding of the left more than right quadrigeminal cistern   It is difficult to assess for underlying parenchymal hemorrhage  Continued surveillance imaging is recommended  · 10/8/19 - MRI brain wo: Multifocal acute/recent bihemispheric supratentorial and infratentorial infarcts, as described above compatible with an embolic phenomenon  Findings have overall progressed since the MRI in our superimposed on background of evolving areas of previous seen infarcts  Hemorrhagic conversion is noted associated with the bilateral cerebellar infarcts  Partial effacement of the 4th ventricle is noted without natacha hydrocephalus  · 10/08/2019 - CT head without:  Continued evolution and demarcation of extensive bilateral posterior circulation infarcts with associated edema and sulcal effacement  Re-demonstrated evolving right thalamic infarct  Additional scattered small embolic infarcts are better seen on prior MRI  The 4th ventricle is mildly S a cyst however patent  The ventricle size is stable  Mild hyperdensities within the cerebellum similar to prior examination compatible with petechial hemorrhage  No large new intracranial hemorrhage is seen  Medical management:  · Prior patient was on aspirin and plavix for prior stroke, received DDAVP  · Due to the hemorrhage, patient was not a candidate for tPA  · Currently on non-bolus heparin drip with PTT goal 40-60  · Neurology following for stroke management  · MAP >65   · SBP goal 120-160  · Going off the cuff pressures, a-line not reliable and removed  · Xander discontinue due to hypertension   · Hgb goal is greater than or equal to 9 0  · Currently 8 2  · Received 2U PRBCs 10/7/19  · WBC 15 13, afebrile   · Na 140, recommend goal 145-155 to help with cerebral edema  · Sedation has been off since this morning  Family discussion to discuss prognosis  Dr Carla Navarro spoke to son this morning over the phone  Son and daughter planning to come into to have formal discussion  Continue medical management at this time       Essential hypertension  Assessment & Plan  · SBP goal 120-160    * Hemorrhagic stroke [facility])  Assessment & Plan  · Imaging with hemorrhagic conversion in prior stroke territory (appears stable)        Subjective/Objective   Chief Complaint: follow up on basilar thrombectomy and right vertebral angioplasty    Subjective: discussed with nursing staff, patient's blood pressure has been high all morning without responding to the PRN medications he received  Otherwise, no additional events overnight  Objective: intubated    I/O       10/07 0701 - 10/08 0700 10/08 0701 - 10/09 0700 10/09 0701 - 10/10 0700    I V  (mL/kg) 5187 4 (66 8) 1170 8 (15) 44 (0 6)    NG/GT 60 120     IV Piggyback 1000      Feedings 570 1100 110    Total Intake(mL/kg) 6817 4 (87 9) 2390 8 (30 6) 154 (2)    Urine (mL/kg/hr) 3775 (2) 1650 (0 9) 350 (1)    Blood 750      Total Output 4525 1650 350    Net +2292 4 +740 8 -196                 Invasive Devices     Peripherally Inserted Central Catheter Line            PICC Line 15/55/04 Right Basilic 1 day          Arterial Line            Arterial Line 10/07/19 Right Radial 2 days          Drain            Urethral Catheter 16 Fr  2 days    NG/OG/Enteral Tube Nasogastric 12 Fr Left nares 1 day          Airway            ETT  Cuffed;Oral 7 5 mm 2 days    ETT  Cuffed;Oral;Hi-Lo 7 5 mm 1 day                Physical Exam:  Vitals: Blood pressure (!) 175/73, pulse 98, temperature (!) 102 6 °F (39 2 °C), resp  rate (!) 31, height 5' 10" (1 778 m), weight 78 2 kg (172 lb 6 4 oz), SpO2 98 %  ,Body mass index is 24 74 kg/m²  General appearance: appears stated age, and no distress  Head: Normocephalic  Eyes: +right gaze deviation  Pupils reactive bilaterally  Lungs: intubated, no cough   Heart: regular heart rate  Neurologic:   Mental status:  GCS 6T (1E, 4M, 1VT)  Cranial nerves: grossly intact (Cranial nerves II-XII)    Motor: does not follow commands, withdrawals in BLE, no movement to painful stimuli in BUE  Reflexes: negative bowen, negative clonus      Lab Results:  Results from last 7 days   Lab Units 10/09/19  0514 10/08/19  0516 10/07/19  2344 10/07/19  1412  10/07/19  1113 10/07/19  0417   WBC Thousand/uL 11 80* 15 13*  --  9 40  --  12 52* 10 72*   HEMOGLOBIN g/dL 8 2* 8 1* 8 5* 9 5*  --  10 0* 12 6   I STAT HEMOGLOBIN   --   --   --   --    < >  --   --    HEMATOCRIT % 24 7* 24 4*  --  28 1*  --  29 9* 37 9   HEMATOCRIT, ISTAT   --   --   --   --    < >  --   --    PLATELETS Thousands/uL 205 196  --  175  --  287 243   NEUTROS PCT % 77*  --   --   --   --  92* 82*   MONOS PCT % 10  --   --   --   --  3* 6    < > = values in this interval not displayed  Results from last 7 days   Lab Units 10/09/19  0514 10/08/19  0516 10/07/19  1412 10/07/19  1128   POTASSIUM mmol/L 3 8 3 7 4 1  --    CHLORIDE mmol/L 109* 114* 113*  --    CO2 mmol/L 24 22 20*  --    CO2, I-STAT mmol/L  --   --   --  21   BUN mg/dL 13 15 12  --    CREATININE mg/dL 0 82 0 97 0 74  --    CALCIUM mg/dL 8 2* 8 1* 7 4*  --    GLUCOSE, ISTAT mg/dl  --   --   --  157*     Results from last 7 days   Lab Units 10/09/19  0514 10/08/19  0516 10/07/19  0417   MAGNESIUM mg/dL 2 2 2 0 2 4     Results from last 7 days   Lab Units 10/07/19  0417   PHOSPHORUS mg/dL 3 3     Results from last 7 days   Lab Units 10/08/19  1858 10/08/19  1235 10/08/19  0516  10/07/19  1713 10/07/19  0417 10/06/19  1836   INR   --   --   --   --  1 23* 1 03 1 01   PTT seconds 45* 43* 118*   < > 28 30 30    < > = values in this interval not displayed  No results found for: TROPONINT  ABG:No results found for: PHART, SOP9MPC, PO2ART, YEB0RNN, Z8XFXWFP, BEART, SOURCE    Imaging Studies: I have personally reviewed pertinent reports  and I have personally reviewed pertinent films in PACS  CT head wo contrast   Final Result      1  Continued evolution and demarcation of extensive bilateral posterior circulation infarction with associated edema and sulcal effacement    Redemonstrated evolving right thalamic infarct  Additional scattered small embolic infarcts are better seen on    prior MRI  2   The 4th ventricle is mildly effaced however patent  The ventricular size is stable  3   Mild hyperdensities within the cerebellum similar to prior examination compatible with petechial hemorrhage  No large new intracranial hemorrhage is seen  Workstation performed: [id number]         MRI brain wo contrast   Final Result      Multifocal acute/recent bihemispheric supratentorial and infratentorial infarcts, as described above compatible with an embolic phenomenon  Findings have overall progressed since the MRI and are superimposed on the background of evolving areas of    previously seen infarction  Hemorrhagic conversion is noted associated with the bilateral cerebellar infarcts  Partial effacement of the 4th ventricle is noted without natacha hydrocephalus  I personally discussed this study with Dr Hiram Carr on 10/8/2019 at 7:25 PM                      Workstation performed: [id number]         CT head wo contrast   Final Result         1  Evolving hypodensities in the cerebellum compatible with resorbing contrast staining with small area of patchy residual hyperdensities noted  A small element of petechial hemorrhage not entirely excluded  No new hemorrhage  2   Extensive bilateral cerebellar and posterior cerebral artery recent infarctions with associated local sulcal effacement/mass effect  3   Moderate, chronic microangiopathy elsewhere  Workstation performed: [id number]         CT head wo contrast   Final Result      Stable bilateral occipital and cerebellar parenchymal hyperattenuation from contrast staining  Underlying infarction is suspected as there is persistent edema and crowding of the left more than right quadrigeminal cistern  It is difficult to assess for    underlying parenchymal hemorrhage    Continued surveillance imaging is recommended  Cerebral chronic microangiopathic disease  Extensive intracranial atherosclerotic disease  Workstation performed: [id number]         XR chest portable ICU   Final Result      No acute cardiopulmonary disease  ET tube 6 cm above the carlos  Workstation performed: [id number]         CT head wo contrast   Final Result      Multiple infarcts identified within the posterior fossa demonstrating increased density most likely representing contrast staining  Similar findings identified within the occipital lobes bilaterally as well as the right posterior thalamus  Superimposed    petechial hemorrhage within the infarcts not excluded  Extensive vascular calcification of the distal vertebral arteries and basilar artery  I personally discussed this study with Peng Gee on 10/7/2019 at 1:26 PM                            Workstation performed: [id number]         CT cerebral perfusion   Final Result      Early infarcts are identified within the occipital lobes and posterior fossa bilaterally with interval development of cortical hyperdensity present within both occipital lobes as well as the left superior cerebellum  This hyperdensity is consistent with    early cortical petechial hemorrhage  CT perfusion performed  Data available on PACS  Workstation performed: [id number]         CTA head and neck w wo contrast   Final Result      1  Subacute infarct at the right occipital lobe and left superior cerebellum, with similar findings of hemorrhagic transformation concern for intraparenchymal hematoma at the left superior cerebellum  2  New finding of near complete occlusion and loss of enhancement at the left V4 segment with thrombus extending into the proximal basilar artery  There is asymmetric decreased flow throughout the right P2 segment and more distal branches    Otherwise    vessel disease throughout the cervical segment of the left vertebral artery and right V4 segment are similar to CT examination of 9/30/2019  3   No intracranial aneurysm  No hemodynamically significant stenosis or occlusion of the major vessels of the [place] of [place]  4  Focal airspace disease at the left upper lobe, concerning for acute infiltrate, new since 9/30/2019  Findings discussed with Dr Jazmyne An at 6:45 AM, 10/7/2019  Workstation performed: [id number]         CT head wo contrast   Final Result      Subacute infarcts at the right occipital lobe and superior aspect left cerebellum are again seen  Similar evidence of hemorrhagic change at the left superior cerebellum, concerning for intraparenchymal hematoma  MR brain may be obtained for further    evaluation  Moderate microvascular ischemic      Findings were discussed with Dr Jazmyne An at 6:45 AM, 10/7/2019                     Workstation performed: [id number]         IR cerebral angiography / intervention    (Results Pending)         EKG, Pathology, and Other Studies: I have personally reviewed pertinent reports        VTE  Prophylaxis: Sequential compression device (Venodyne)  and Heparin

## 2019-10-09 NOTE — ASSESSMENT & PLAN NOTE
POD2 basilar artery thrombectomy with residual left PCA occlusion and right vertebral artery angioplasty  TICI 2B    Imaging personally reviewed and reviewed with attending:  · 10/7/19 - CTA head and neck w/wo: 1) subacute infarct at the right occipital lobe and left superior cerebellum, with similar findings of hemorrhagic transformation concern for intraparenchymal hematoma at the left superior cerebellum  2) New findings of near complete occlusion and loss of enhancement at the left V4 segment with thrombus extending into the proximal basilar artery  There is asymmetric decreased flow through the right P2 segment and more distal branches  otherwise vessel disease throughout the cervical segment of the left vertebral artery and right V4  Segment are similar to CT examination of 9/30/2019  3) no intracranial aneurysm  No hemodynamically significant stenosis or occlusion of the major vessels of the [place] of [place]  4) Focal airspace disease at the left upper lobe, concerning for acute infiltrate, new since 9/30/2019  · 10/7/19 - CT head wo: 1) multiple infarcts identified within the posterior fossa demonstrating increased density most likely representing contrast staining  Similar findings identified within within the occipital lobes bilaterally as well as the right posterior thalamus  Superimposed petechial hemorrhage within the infarcts not excluded  · 10/7/19 - CT head wo: 1) Stable bilateral occipital and cerebellar parenchymal hyperattenuation from contrast staining  Underlying infarction is suspected as there is persistent edema and crowding of the left more than right quadrigeminal cistern  It is difficult to assess for underlying parenchymal hemorrhage  Continued surveillance imaging is recommended  · 10/8/19 - MRI brain wo: Multifocal acute/recent bihemispheric supratentorial and infratentorial infarcts, as described above compatible with an embolic phenomenon    Findings have overall progressed since the MRI in our superimposed on background of evolving areas of previous seen infarcts  Hemorrhagic conversion is noted associated with the bilateral cerebellar infarcts  Partial effacement of the 4th ventricle is noted without natacha hydrocephalus  · 10/08/2019 - CT head without:  Continued evolution and demarcation of extensive bilateral posterior circulation infarcts with associated edema and sulcal effacement  Re-demonstrated evolving right thalamic infarct  Additional scattered small embolic infarcts are better seen on prior MRI  The 4th ventricle is mildly S a cyst however patent  The ventricle size is stable  Mild hyperdensities within the cerebellum similar to prior examination compatible with petechial hemorrhage  No large new intracranial hemorrhage is seen  Medical management:  · Prior patient was on aspirin and plavix for prior stroke, received DDAVP  · Due to the hemorrhage, patient was not a candidate for tPA  · Currently on non-bolus heparin drip with PTT goal 40-60  · Neurology following for stroke management  · MAP >65   · SBP goal 120-160  · Going off the cuff pressures, a-line not reliable and removed  · Xander discontinue due to hypertension   · Hgb goal is greater than or equal to 9 0  · Currently 8 2  · Received 2U PRBCs 10/7/19  · WBC 15 13, afebrile   · Na 140, recommend goal 145-155 to help with cerebral edema  · Sedation has been off since this morning  Family discussion to discuss prognosis  Dr Kb Dillon spoke to son this morning over the phone  Son and daughter planning to come into to have formal discussion  Continue medical management at this time

## 2019-10-09 NOTE — OCCUPATIONAL THERAPY NOTE
OT CANCEL NOTE    OT orders received  Chart reviewed  Pt is currently intubated/sedated and not appropriate to engage in skilled OT services at this time  Will hold initial OT evaluation  Will continue to follow pt on caseload and see pt when medically stable and as clinically appropriate      Roxana MAYO, OTR/L

## 2019-10-10 NOTE — ASSESSMENT & PLAN NOTE
POD3 basilar artery thrombectomy with residual left PCA occlusion and right vertebral artery angioplasty  TICI 2B    Imaging personally reviewed and reviewed with attending:  · 10/7/19 - CTA head and neck w/wo: 1) subacute infarct at the right occipital lobe and left superior cerebellum, with similar findings of hemorrhagic transformation concern for intraparenchymal hematoma at the left superior cerebellum  2) New findings of near complete occlusion and loss of enhancement at the left V4 segment with thrombus extending into the proximal basilar artery  There is asymmetric decreased flow through the right P2 segment and more distal branches  otherwise vessel disease throughout the cervical segment of the left vertebral artery and right V4  Segment are similar to CT examination of 9/30/2019  3) no intracranial aneurysm  No hemodynamically significant stenosis or occlusion of the major vessels of the [place] of [place]  4) Focal airspace disease at the left upper lobe, concerning for acute infiltrate, new since 9/30/2019  · 10/7/19 - CT head wo: 1) multiple infarcts identified within the posterior fossa demonstrating increased density most likely representing contrast staining  Similar findings identified within within the occipital lobes bilaterally as well as the right posterior thalamus  Superimposed petechial hemorrhage within the infarcts not excluded  · 10/7/19 - CT head wo: 1) Stable bilateral occipital and cerebellar parenchymal hyperattenuation from contrast staining  Underlying infarction is suspected as there is persistent edema and crowding of the left more than right quadrigeminal cistern  It is difficult to assess for underlying parenchymal hemorrhage  Continued surveillance imaging is recommended  · 10/8/19 - MRI brain wo: Multifocal acute/recent bihemispheric supratentorial and infratentorial infarcts, as described above compatible with an embolic phenomenon    Findings have overall progressed since the MRI in our superimposed on background of evolving areas of previous seen infarcts  Hemorrhagic conversion is noted associated with the bilateral cerebellar infarcts  Partial effacement of the 4th ventricle is noted without natacha hydrocephalus  · 10/08/2019 - CT head without:  Continued evolution and demarcation of extensive bilateral posterior circulation infarcts with associated edema and sulcal effacement  Re-demonstrated evolving right thalamic infarct  Additional scattered small embolic infarcts are better seen on prior MRI  The 4th ventricle is mildly S a cyst however patent  The ventricle size is stable  Mild hyperdensities within the cerebellum similar to prior examination compatible with petechial hemorrhage  No large new intracranial hemorrhage is seen  · Plan for CTA head and neck w/wo tomorrow  · STAT CT head without contrast if decline in GCS >2pts/1h    Medical management:  · Prior patient was on aspirin and plavix for prior stroke, received DDAVP  · Due to the hemorrhage, patient was not a candidate for tPA  · Currently on non-bolus heparin drip with PTT goal 40-60  · PTT currently 47  · Neurology following for stroke management   · MAP >65   · SBP goal 120-160 - getting metoprolol 50mg q12h and labetalol 10mg q4h PRN   · Hgb goal is greater than or equal to 9 0  · Currently 7 8  · Received 2U PRBCs 10/7/19  · WBC 14 07, Tmax 102 6  · Na 135, recommend goal 145-155 to help with cerebral edema  · Ongoing family discussions  Palliative care consulted  · There was a family conversation on 10/09/2019 with Dr Traci Sauceda, Dr Tim Garber, patient's daughter and son present  Planning for additional family meeting today  · Continue medical management at this time  Planned CTA tomorrow

## 2019-10-10 NOTE — PROGRESS NOTES
Pastoral Care Progress Note    10/10/2019  Patient: Froilan Frankel : 1935  Admission Date & Time: 10/7/2019 0328  MRN: [id number] CSN: [id number]                     Chaplaincy Interventions Utilized:       Relationship Building: Cultivated a relationship of care and support    Ritual: Jettie Stage provided sacrament of the sick and Provided prayer    Jettie Stage provided prayers of the final commendation, annointed patient and provided blessing        Patient was comforted by the anointing and prayers     10/10/19 1400   Clinical Encounter Type   Visited With Patient and family together   Roman Catholic Encounters   Roman Catholic Needs Prayer   Sacramental Encounters   Sacrament of Sick-Anointing Anointed   Sacrament Other Other (Comment)  (final commedation)

## 2019-10-10 NOTE — PLAN OF CARE
Problem: Nutrition/Hydration-ADULT  Goal: Nutrient/Hydration intake appropriate for improving, restoring or maintaining nutritional needs  Description  Monitor and assess patient's nutrition/hydration status for malnutrition  Collaborate with interdisciplinary team and initiate plan and interventions as ordered  Monitor patient's weight and dietary intake as ordered or per policy  Utilize nutrition screening tool and intervene as necessary  Determine patient's food preferences and provide high-protein, high-caloric foods as appropriate       INTERVENTIONS:  - Monitor oral intake, urinary output, labs, and treatment plans  - Assess nutrition and hydration status and recommend course of action  - Evaluate amount of meals eaten  - Assist patient with eating if necessary   - Allow adequate time for meals  - Recommend/ encourage appropriate diets, oral nutritional supplements, and vitamin/mineral supplements  - Order, calculate, and assess calorie counts as needed  - Recommend, monitor, and adjust tube feedings and TPN/PPN based on assessed needs  - Assess need for intravenous fluids  - Provide specific nutrition/hydration education as appropriate  - Include patient/family/caregiver in decisions related to nutrition  Outcome: Progressing     Problem: PAIN - ADULT  Goal: Verbalizes/displays adequate comfort level or baseline comfort level  Description  Interventions:  - Encourage patient to monitor pain and request assistance  - Assess pain using appropriate pain scale  - Administer analgesics based on type and severity of pain and evaluate response  - Implement non-pharmacological measures as appropriate and evaluate response  - Consider cultural and social influences on pain and pain management  - Notify physician/advanced practitioner if interventions unsuccessful or patient reports new pain  Outcome: Progressing     Problem: INFECTION - ADULT  Goal: Absence or prevention of progression during hospitalization  Description  INTERVENTIONS:  - Assess and monitor for signs and symptoms of infection  - Monitor lab/diagnostic results  - Monitor all insertion sites, i e  indwelling lines, tubes, and drains  - Monitor endotracheal if appropriate and nasal secretions for changes in amount and color  - [place] appropriate cooling/warming therapies per order  - Administer medications as ordered  - Instruct and encourage patient and family to use good hand hygiene technique  - Identify and instruct in appropriate isolation precautions for identified infection/condition  Outcome: Progressing     Problem: SAFETY ADULT  Goal: Patient will remain free of falls  Description  INTERVENTIONS:  - Assess patient frequently for physical needs  -  Identify cognitive and physical deficits and behaviors that affect risk of falls    -  [place] fall precautions as indicated by assessment   - Educate patient/family on patient safety including physical limitations  - Instruct patient to call for assistance with activity based on assessment  - Modify environment to reduce risk of injury  - Consider OT/PT consult to assist with strengthening/mobility  Outcome: Progressing     Problem: DISCHARGE PLANNING  Goal: Discharge to home or other facility with appropriate resources  Description  INTERVENTIONS:  - Identify barriers to discharge w/patient and caregiver  - Arrange for needed discharge resources and transportation as appropriate  - Identify discharge learning needs (meds, wound care, etc )  - Arrange for interpretive services to assist at discharge as needed  - Refer to Case Management Department for coordinating discharge planning if the patient needs post-hospital services based on physician/advanced practitioner order or complex needs related to functional status, cognitive ability, or social support system  Outcome: Progressing     Problem: Knowledge Deficit  Goal: Patient/family/caregiver demonstrates understanding of disease process, treatment plan, medications, and discharge instructions  Description  Complete learning assessment and assess knowledge base  Interventions:  - Provide teaching at level of understanding  - Provide teaching via preferred learning methods  Outcome: Progressing     Problem: Neurological Deficit  Goal: Neurological status is stable or improving  Description  Interventions:  - Monitor and assess patient's level of consciousness, motor function, sensory function, and level of assistance needed for ADLs  - Monitor and report changes from baseline  Collaborate with interdisciplinary team to initiate plan and implement interventions as ordered  - Provide and maintain a safe environment  - Consider seizure precautions  - Consider fall precautions  - Consider aspiration precautions  - Consider bleeding precautions  Outcome: Progressing     Problem: Activity Intolerance/Impaired Mobility  Goal: Mobility/activity is maintained at optimum level for patient  Description  Interventions:  - Assess and monitor patient  barriers to mobility and need for assistive/adaptive devices  - Assess patient's emotional response to limitations  - Collaborate with interdisciplinary team and initiate plans and interventions as ordered  - Encourage independent activity per ability   - Maintain proper body alignment  - Perform active/passive rom as tolerated/ordered  - Plan activities to conserve energy   - Turn patient as appropriate  Outcome: Progressing     Problem: Communication Impairment  Goal: Ability to express needs and understand communication  Description  Assess patient's communication skills and ability to understand information  Patient will demonstrate use of effective communication techniques, alternative methods of communication and understanding even if not able to speak  - Encourage communication and provide alternate methods of communication as needed    - Collaborate with case management/social services for discharge needs  - Include patient/family/caregiver in decisions related to communication  Outcome: Progressing     Problem: Potential for Aspiration  Goal: Non-ventilated patient's risk of aspiration is minimized  Description  Assess and monitor vital signs, respiratory status, and labs (WBC)  Monitor for signs of aspiration (tachypnea, cough, rales, wheezing, cyanosis, fever)  - Assess and monitor patient's ability to swallow  - Place patient up in chair to eat if possible  - HOB up at 90 degrees to eat if unable to get patient up into chair   - Supervise patient during oral intake  - Instruct patient/ family to take small bites  - Instruct patient/ family to take small single sips when taking liquids  - Follow patient-specific strategies generated by speech pathologist   Outcome: Progressing     Problem: Nutrition  Goal: Nutrition/Hydration status is improving  Description  Monitor and assess patient's nutrition/hydration status for malnutrition (ex- brittle hair, bruises, dry skin, pale skin and conjunctiva, muscle wasting, smooth red tongue, and disorientation)  Collaborate with interdisciplinary team and initiate plan and interventions as ordered  Monitor patient's weight and dietary intake as ordered or per policy  Utilize nutrition screening tool and intervene per policy  Determine patient's food preferences and provide high-protein, high-caloric foods as appropriate  - Assist patient with eating   - Allow adequate time for meals   - Encourage patient to take dietary supplement as ordered  - Collaborate with clinical nutritionist   - Include patient/family/caregiver in decisions related to nutrition    Outcome: Progressing     Problem: Prexisting or High Potential for Compromised Skin Integrity  Goal: Skin integrity is maintained or improved  Description  INTERVENTIONS:  - Identify patients at risk for skin breakdown  - Assess and monitor skin integrity  - Assess and monitor nutrition and hydration status  - Monitor labs   - Assess for incontinence   - Turn and reposition patient  - Assist with mobility/ambulation  - Relieve pressure over bony prominences  - Avoid friction and shearing  - Provide appropriate hygiene as needed including keeping skin clean and dry  - Evaluate need for skin moisturizer/barrier cream  - Collaborate with interdisciplinary team   - Patient/family teaching  - Consider wound care consult   Outcome: Progressing     Problem: Potential for Falls  Goal: Patient will remain free of falls  Description  INTERVENTIONS:  - Assess patient frequently for physical needs  -  Identify cognitive and physical deficits and behaviors that affect risk of falls    -  [place] fall precautions as indicated by assessment   - Educate patient/family on patient safety including physical limitations  - Instruct patient to call for assistance with activity based on assessment  - Modify environment to reduce risk of injury  - Consider OT/PT consult to assist with strengthening/mobility  Outcome: Progressing

## 2019-10-10 NOTE — RESPIRATORY THERAPY NOTE
RT Ventilator Management Note  Froilan Frankel 80 y o  male MRN: [id number]  Unit/Bed#: ICU 11 Encounter: [id number]      Daily Screen       10/8/2019  0755 10/9/2019  0832          Patient safety screen outcome[de-identified]  Passed  Passed      Not Ready for Weaning due to[de-identified]  Underline problem not resolved  Underline problem not resolved      Spont breathing trial outcome[de-identified]          RSBI:    84              Physical Exam:   Assessment Type: Assess only  General Appearance: Sedated  Respiratory Pattern: Spontaneous  Chest Assessment: Chest expansion symmetrical  Bilateral Breath Sounds: Coarse, Rhonchi  Suction: ET Tube  O2 Device: vent      Resp Comments: Pt continues on PSV 5/+5 40% w/o signs of resp distress  Pt remained all night on current settings w/o complications  Increased amount of yellow thick secretions noticed during suctioning

## 2019-10-10 NOTE — PLAN OF CARE
Problem: Nutrition/Hydration-ADULT  Goal: Nutrient/Hydration intake appropriate for improving, restoring or maintaining nutritional needs  Description  Monitor and assess patient's nutrition/hydration status for malnutrition  Collaborate with interdisciplinary team and initiate plan and interventions as ordered  Monitor patient's weight and dietary intake as ordered or per policy  Utilize nutrition screening tool and intervene as necessary  Determine patient's food preferences and provide high-protein, high-caloric foods as appropriate       INTERVENTIONS:  - Monitor oral intake, urinary output, labs, and treatment plans  - Assess nutrition and hydration status and recommend course of action  - Evaluate amount of meals eaten  - Assist patient with eating if necessary   - Allow adequate time for meals  - Recommend/ encourage appropriate diets, oral nutritional supplements, and vitamin/mineral supplements  - Order, calculate, and assess calorie counts as needed  - Recommend, monitor, and adjust tube feedings and TPN/PPN based on assessed needs  - Assess need for intravenous fluids  - Provide specific nutrition/hydration education as appropriate  - Include patient/family/caregiver in decisions related to nutrition  Outcome: Progressing     Problem: PAIN - ADULT  Goal: Verbalizes/displays adequate comfort level or baseline comfort level  Description  Interventions:  - Encourage patient to monitor pain and request assistance  - Assess pain using appropriate pain scale  - Administer analgesics based on type and severity of pain and evaluate response  - Implement non-pharmacological measures as appropriate and evaluate response  - Consider cultural and social influences on pain and pain management  - Notify physician/advanced practitioner if interventions unsuccessful or patient reports new pain  Outcome: Progressing     Problem: INFECTION - ADULT  Goal: Absence or prevention of progression during hospitalization  Description  INTERVENTIONS:  - Assess and monitor for signs and symptoms of infection  - Monitor lab/diagnostic results  - Monitor all insertion sites, i e  indwelling lines, tubes, and drains  - Monitor endotracheal if appropriate and nasal secretions for changes in amount and color  - [place] appropriate cooling/warming therapies per order  - Administer medications as ordered  - Instruct and encourage patient and family to use good hand hygiene technique  - Identify and instruct in appropriate isolation precautions for identified infection/condition  Outcome: Progressing     Problem: SAFETY ADULT  Goal: Patient will remain free of falls  Description  INTERVENTIONS:  - Assess patient frequently for physical needs  -  Identify cognitive and physical deficits and behaviors that affect risk of falls    -  [place] fall precautions as indicated by assessment   - Educate patient/family on patient safety including physical limitations  - Instruct patient to call for assistance with activity based on assessment  - Modify environment to reduce risk of injury  - Consider OT/PT consult to assist with strengthening/mobility  Outcome: Progressing     Problem: DISCHARGE PLANNING  Goal: Discharge to home or other facility with appropriate resources  Description  INTERVENTIONS:  - Identify barriers to discharge w/patient and caregiver  - Arrange for needed discharge resources and transportation as appropriate  - Identify discharge learning needs (meds, wound care, etc )  - Arrange for interpretive services to assist at discharge as needed  - Refer to Case Management Department for coordinating discharge planning if the patient needs post-hospital services based on physician/advanced practitioner order or complex needs related to functional status, cognitive ability, or social support system  Outcome: Progressing     Problem: Knowledge Deficit  Goal: Patient/family/caregiver demonstrates understanding of disease process, treatment plan, medications, and discharge instructions  Description  Complete learning assessment and assess knowledge base  Interventions:  - Provide teaching at level of understanding  - Provide teaching via preferred learning methods  Outcome: Progressing     Problem: Neurological Deficit  Goal: Neurological status is stable or improving  Description  Interventions:  - Monitor and assess patient's level of consciousness, motor function, sensory function, and level of assistance needed for ADLs  - Monitor and report changes from baseline  Collaborate with interdisciplinary team to initiate plan and implement interventions as ordered  - Provide and maintain a safe environment  - Consider seizure precautions  - Consider fall precautions  - Consider aspiration precautions  - Consider bleeding precautions  Outcome: Progressing     Problem: Activity Intolerance/Impaired Mobility  Goal: Mobility/activity is maintained at optimum level for patient  Description  Interventions:  - Assess and monitor patient  barriers to mobility and need for assistive/adaptive devices  - Assess patient's emotional response to limitations  - Collaborate with interdisciplinary team and initiate plans and interventions as ordered  - Encourage independent activity per ability   - Maintain proper body alignment  - Perform active/passive rom as tolerated/ordered  - Plan activities to conserve energy   - Turn patient as appropriate  Outcome: Progressing     Problem: Communication Impairment  Goal: Ability to express needs and understand communication  Description  Assess patient's communication skills and ability to understand information  Patient will demonstrate use of effective communication techniques, alternative methods of communication and understanding even if not able to speak  - Encourage communication and provide alternate methods of communication as needed    - Collaborate with case management/social services for discharge needs  - Include patient/family/caregiver in decisions related to communication  Outcome: Progressing     Problem: Nutrition  Goal: Nutrition/Hydration status is improving  Description  Monitor and assess patient's nutrition/hydration status for malnutrition (ex- brittle hair, bruises, dry skin, pale skin and conjunctiva, muscle wasting, smooth red tongue, and disorientation)  Collaborate with interdisciplinary team and initiate plan and interventions as ordered  Monitor patient's weight and dietary intake as ordered or per policy  Utilize nutrition screening tool and intervene per policy  Determine patient's food preferences and provide high-protein, high-caloric foods as appropriate  - Assist patient with eating   - Allow adequate time for meals   - Encourage patient to take dietary supplement as ordered  - Collaborate with clinical nutritionist   - Include patient/family/caregiver in decisions related to nutrition    Outcome: Progressing     Problem: Prexisting or High Potential for Compromised Skin Integrity  Goal: Skin integrity is maintained or improved  Description  INTERVENTIONS:  - Identify patients at risk for skin breakdown  - Assess and monitor skin integrity  - Assess and monitor nutrition and hydration status  - Monitor labs   - Assess for incontinence   - Turn and reposition patient  - Assist with mobility/ambulation  - Relieve pressure over bony prominences  - Avoid friction and shearing  - Provide appropriate hygiene as needed including keeping skin clean and dry  - Evaluate need for skin moisturizer/barrier cream  - Collaborate with interdisciplinary team   - Patient/family teaching  - Consider wound care consult   Outcome: Progressing     Problem: Potential for Falls  Goal: Patient will remain free of falls  Description  INTERVENTIONS:  - Assess patient frequently for physical needs  -  Identify cognitive and physical deficits and behaviors that affect risk of falls   -  [place] fall precautions as indicated by assessment   - Educate patient/family on patient safety including physical limitations  - Instruct patient to call for assistance with activity based on assessment  - Modify environment to reduce risk of injury  - Consider OT/PT consult to assist with strengthening/mobility  Outcome: Progressing     Problem: Potential for Aspiration  Goal: Ventilated patient's risk of aspiration is minimized  Description  Assess and monitor vital signs, respiratory status, airway cuff pressure, and labs (WBC)  Monitor for signs of aspiration (tachypnea, cough, rales, wheezing, cyanosis, fever)  - Elevate head of bed 30 degrees if patient has tube feeding   - Monitor tube feeding    Outcome: Progressing

## 2019-10-10 NOTE — PROGRESS NOTES
3 % nss initiated this pm as ordered  q 6 hour labs obtained as ordered  Daughter at bedside and updated on pt  Status

## 2019-10-10 NOTE — PROGRESS NOTES
Progress Note - Critical Care   Lupillo Brar 80 y o  male MRN: [id number]  Unit/Bed#: ICU 11 Encounter: [id number]    Assessment:   Patient Active Problem List   Diagnosis    Essential hypertension    Pure hypercholesterolemia    Depression    Chronic pain    ASCVD (arteriosclerotic cardiovascular disease)    [facility] aortic aneurysm ([facility]    Coronary artery disease involving native coronary artery of native heart without angina pectoris    H/O prostate cancer    Acute left-sided low back pain with sciatica    SARIKA (generalized anxiety disorder)    Headache    CVA (cerebral vascular accident) ([facility]    Acute kidney injury ([facility] )    Hemorrhagic stroke ([facility]    Leukocytosis    Urinary retention       Plan: Family meeting with son, daughter, and son in law yesterday with Dr Connie Simms and Dr Worthington about the critical nature of the patients' condition  Son is leaning more towards comfort directed care  Daughter is hesitant  Will plan more conversations today and consider palliative care consult  Current code status is level 2 after discussion yesterday  Neuro:  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:  -S/P Angiogram w/ Basilar Artery Thrombectomy POD #3   -Patient presented with left sided hemiparesis & hemineglect  -Remaining occlusion of the left PCA  -Intubated; currently no sedation  -CT head 10/08:1  Continued evolution and demarcation of extensive bilateral posterior circulation infarction with associated edema and sulcal effacement  Redemonstrated evolving right thalamic infarct  Additional scattered small embolic infarcts are better seen on   prior MRI  2   The 4th ventricle is mildly effaced however patent  The ventricular size is stable  3   Mild hyperdensities within the cerebellum similar to prior examination compatible with petechial hemorrhage  No large new intracranial hemorrhage is seen  - MRI of the head 10/8: Multifocal acute/recent bihemispheric supratentorial and infratentorial infarcts, as compatible with an embolic phenomenon  Findings have overall progressed since the MRI and are superimposed on the background of evolving areas of previously seen infarction  Hemorrhagic conversion is noted associated with the bilateral cerebellar infarcts  Partial effacement of the 4th ventricle is noted without natacha hydrocephalus   -Continue heparin drip as    -Low dose heparin gtt (PTT goal 40-60); Monitor PTTs  -Continue lipitor 40 mg  -Subacute infarct at the right occipital lobe and superior aspect of the left cerebellum with Development of hemorrhagic transformation compared to the CTA completed on 09/30                 CV:   Hypertension:  -Continue metoprolol 50 mg BID; PRN IV labetalol  -SBP goals 120-160; MAPs>65                 Lung:  Acute respiratory failure  -PSV: FIO2 40%, PEEP 5, Pressure support 5  -Maintain SPO2 >95%                 GI:  -Zofran PRN  -Stress ulcer prophylaxis: No                 FEN:   -Nutrition: Keofed tube feeds w/ Jevity 1 2 Nato, rate 60ml/hr**  -Monitor BMP; replete electrolytes as needed with goal K>4, phos>3, and Mag>2                 :  -Cortez catheter in place   -Cr at baseline  -Trend Cr                 ID:   Leukocytosis  -WBC trending up; 14 07 yesterday  -Afebrile; Possibly stress induced  -Trend CBC                 Heme:  Acute blood loss anemia  -Hgb 7 8  -PTT 45 yesterday; pending new results  Goal 40-6  Endo:   Hyperglycemia  -goal 140-180  -Currently on insulin drip algorithm 1                            Msk/Skin:  Frequen repositioning for pressure ulcer prophylaxis                 Disposition: ICU    Chief Complaint: Left sided weakness    HPI/24hr events:     Overnight events - none  Review of Systems   Unable to perform ROS: Intubated       Physical Exam:   Physical Exam   Constitutional: He appears well-developed and well-nourished  HENT:   Head: Normocephalic and atraumatic     Eyes:   Right sided gaze and pin point pupils   Cardiovascular: Normal rate and regular rhythm  Exam reveals no gallop and no friction rub  No murmur heard  Pulmonary/Chest: Breath sounds normal    Intubated on pressure support   Abdominal: Soft  Bowel sounds are normal    Neurological:   Nonresponsive and nonverbal     GCS 5-6 - No eye opening, non-verbal, withdrawal/flexion in response to pain on the right  Skin: Skin is warm and dry  Vitals:    10/10/19 0235 10/10/19 0335 10/10/19 0435 10/10/19 0535   BP: 155/73 140/62 146/67 103/52   BP Location:   Left arm    Pulse: 100 90 88 86   Resp: (!) 28 (!) 26 (!) 25 (!) 26   Temp: (!) 100 8 °F (38 2 °C) 99 3 °F (37 4 °C) 99 °F (37 2 °C) 98 2 °F (36 8 °C)   TempSrc:   Probe    SpO2: 100% 99% 99% 100%   Weight:       Height:         Arterial Line BP: 218/56  Arterial Line MAP (mmHg): 100 mmHg    Temperature:   Temp (24hrs), Av °F (37 8 °C), Min:96 8 °F (36 °C), Max:102 6 °F (39 2 °C)    Current: Temperature: 98 2 °F (36 8 °C)    Weights:   IBW: 73 kg    Body mass index is 24 74 kg/m²    Weight (last 2 days)     Date/Time   Weight    10/09/19 0551   78 2 (172 4)    10/08/19 1447   77 6 (171 08)    10/08/19 0600   77 6 (171 08)                 Non-Invasive/Invasive Ventilation Settings:  Respiratory    Lab Data (Last 4 hours)    None         O2/Vent Data (Last 4 hours)      10/10 0420           Vent Mode CPAP/PS Spont       FIO2 (%) (%) 40       PEEP (cmH2O) (cmH2O) 5       Pressure Support (cmH2O) (cmH20) 5       MV (Obs) 8 03       RSBI 75                 No results found for: PHART, EAP3QUJ, PO2ART, ZPB4YKO, F7TACCTF, BEART, SOURCE    Intake and Outputs:  I/O       10/08 0701 - 10/09 0700 10/09 0701 - 10/10 0700    I V  (mL/kg) 1170 8 (15) 245 5 (3 1)    NG/ 50    Feedings 1100 1210    Total Intake(mL/kg) 2390 8 (30 6) 1505 5 (19 3)    Urine (mL/kg/hr) 1650 (0 9) 1825 (1)    Emesis/NG output  0    Total Output 1650 1825    Net +740 8 -319 5               Nutrition: Diet Orders   (From admission, onward)             Start     Ordered    10/07/19 1323  Diet Enteral/Parenteral; Tube Feeding No Oral Diet; Jevity 1 2 Nato; Continuous; 55  Diet effective now     Comments:  Start at 20 m/l hr, titrate by 10-20 ml/hr every 4 hours to goal    Question Answer Comment   Diet Type Enteral/Parenteral    Enteral/Parenteral Tube Feeding No Oral Diet    Tube Feeding Formula: Jevity 1 2 Nato    Bolus/Cyclic/Continuous Continuous    Tube Feeding Goal Rate (mL/hr): 55    RD to adjust diet per protocol? Yes        10/07/19 1326                Labs:   Results from last 7 days   Lab Units 10/10/19  0505 10/09/19  0514 10/08/19  0516  10/07/19  1113 10/07/19  0417   WBC Thousand/uL 14 07* 11 80* 15 13*   < > 12 52* 10 72*   HEMOGLOBIN g/dL 7 8* 8 2* 8 1*   < > 10 0* 12 6   I STAT HEMOGLOBIN   --   --   --    < >  --   --    HEMATOCRIT % 23 9* 24 7* 24 4*   < > 29 9* 37 9   HEMATOCRIT, ISTAT   --   --   --    < >  --   --    PLATELETS Thousands/uL 206 205 196   < > 287 243   NEUTROS PCT %  --  77*  --   --  92* 82*   MONOS PCT %  --  10  --   --  3* 6    < > = values in this interval not displayed      Results from last 7 days   Lab Units 10/09/19  0514 10/08/19  0516 10/07/19  1412 10/07/19  1128   SODIUM mmol/L 140 144 140  --    POTASSIUM mmol/L 3 8 3 7 4 1  --    CHLORIDE mmol/L 109* 114* 113*  --    CO2 mmol/L 24 22 20*  --    CO2, I-STAT mmol/L  --   --   --  21   BUN mg/dL 13 15 12  --    CREATININE mg/dL 0 82 0 97 0 74  --    CALCIUM mg/dL 8 2* 8 1* 7 4*  --    GLUCOSE, ISTAT mg/dl  --   --   --  157*     Results from last 7 days   Lab Units 10/09/19  0514 10/08/19  0516 10/07/19  0417   MAGNESIUM mg/dL 2 2 2 0 2 4     Results from last 7 days   Lab Units 10/07/19  0417   PHOSPHORUS mg/dL 3 3      Results from last 7 days   Lab Units 10/10/19  0505 10/09/19  1946 10/08/19  1858  10/07/19  1713 10/07/19  0417 10/06/19  1836   INR   --   --   --   --  1 23* 1 03 1 01   PTT seconds 47* 45* 45* < > 28 30 30    < > = values in this interval not displayed  0   Lab Value Date/Time    TROPONINI 0 11 (H) 09/30/2019 1519    TROPONINI 0 09 (H) 09/30/2019 1154    TROPONINI 0 11 (H) 09/30/2019 0825    TROPONINI <0 02 08/06/2019 1449    TROPONINI <0 02 02/06/2017 0045    TROPONINI <0 02 02/05/2017 2159    TROPONINI <0 02 02/05/2017 1806    TROPONINI <0 04 04/20/2014 0520    TROPONINI <0 04 04/19/2014 2340    TROPONINI <0 04 04/19/2014 1620       Imaging:  I have personally reviewed pertinent films in PACS    EKG: NSR    Micro:  Lab Results   Component Value Date    URINECX No Growth <1000 cfu/mL 12/27/2018    URINECX >100,000 cfu/ml Escherichia coli (A) 09/17/2018       Allergies: Allergies   Allergen Reactions    Augmentin [Amoxicillin-Pot Clavulanate] GI Intolerance       Medications:   Scheduled Meds:  Current Facility-Administered Medications:  acetaminophen 650 mg Oral Q6H PRN Malu Painting MD    atorvastatin 40 mg Oral QPM Nora Bryant MD    chlorhexidine 15 mL Swish & Spit Q12H [address] Silvino Cordova MD    heparin (porcine) 300-2,000 Units/hr Intravenous Titrated Valeri Rubinstein, MD Last Rate: 590 Units/hr (10/10/19 0510)   insulin regular (HumuLIN R,NovoLIN R) infusion 0 3-21 Units/hr Intravenous Titrated Malu Painting MD Last Rate: 4 Units/hr (10/10/19 0224)   Labetalol HCl 10 mg Intravenous Q4H PRN Jaelyn Rodriguez MD    metoprolol tartrate 50 mg Oral Q12H [address], MD      Continuous Infusions:  heparin (porcine) 300-2,000 Units/hr Last Rate: 590 Units/hr (10/10/19 0510)   insulin regular (HumuLIN R,NovoLIN R) infusion 0 3-21 Units/hr Last Rate: 4 Units/hr (10/10/19 0224)     PRN Meds:    acetaminophen 650 mg Q6H PRN   Labetalol HCl 10 mg Q4H PRN       VTE Pharmacologic Prophylaxis: Heparin  VTE Mechanical Prophylaxis: sequential compression device    Invasive lines and devices:   Invasive Devices     Peripherally Inserted Central Catheter Line            PICC Line 13/67/42 Right Basilic 2 days          Drain            NG/OG/Enteral Tube Nasogastric 12 Fr Left nares 2 days    Urethral Catheter 16 Fr  2 days          Airway            ETT  Cuffed;Oral 7 5 mm 2 days    ETT  Cuffed;Oral;Hi-Lo 7 5 mm 2 days                   Counseling / Coordination of Care  Total Critical Care time spent 25 minutes excluding procedures, teaching and family updates  Code Status: Level 2 - DNAR: but accepts endotracheal intubation     Portions of the record may have been created with voice recognition software  Occasional wrong word or "sound a like" substitutions may have occurred due to the inherent limitations of voice recognition software  Read the chart carefully and recognize, using context, where substitutions have occurred       Maxewll Bernal MD
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
## 2019-10-10 NOTE — PROGRESS NOTES
Progress Note - Hailey Briseno 1935, 80 y o  male MRN: [id number]    Unit/Bed#: ICU 11 Encounter: [id number]    Primary Care Provider: Jl Celeste MD   Date and time admitted to hospital: 10/7/2019  3:28 AM        CVA (cerebral vascular accident) [facility])  Assessment & Plan  POD3 basilar artery thrombectomy with residual left PCA occlusion and right vertebral artery angioplasty  TICI 2B    Imaging personally reviewed and reviewed with attending:  · 10/7/19 - CTA head and neck w/wo: 1) subacute infarct at the right occipital lobe and left superior cerebellum, with similar findings of hemorrhagic transformation concern for intraparenchymal hematoma at the left superior cerebellum  2) New findings of near complete occlusion and loss of enhancement at the left V4 segment with thrombus extending into the proximal basilar artery  There is asymmetric decreased flow through the right P2 segment and more distal branches  otherwise vessel disease throughout the cervical segment of the left vertebral artery and right V4  Segment are similar to CT examination of 9/30/2019  3) no intracranial aneurysm  No hemodynamically significant stenosis or occlusion of the major vessels of the [place] of [place]  4) Focal airspace disease at the left upper lobe, concerning for acute infiltrate, new since 9/30/2019  · 10/7/19 - CT head wo: 1) multiple infarcts identified within the posterior fossa demonstrating increased density most likely representing contrast staining  Similar findings identified within within the occipital lobes bilaterally as well as the right posterior thalamus  Superimposed petechial hemorrhage within the infarcts not excluded  · 10/7/19 - CT head wo: 1) Stable bilateral occipital and cerebellar parenchymal hyperattenuation from contrast staining  Underlying infarction is suspected as there is persistent edema and crowding of the left more than right quadrigeminal cistern   It is difficult to assess for underlying parenchymal hemorrhage  Continued surveillance imaging is recommended  · 10/8/19 - MRI brain wo: Multifocal acute/recent bihemispheric supratentorial and infratentorial infarcts, as described above compatible with an embolic phenomenon  Findings have overall progressed since the MRI in our superimposed on background of evolving areas of previous seen infarcts  Hemorrhagic conversion is noted associated with the bilateral cerebellar infarcts  Partial effacement of the 4th ventricle is noted without natacha hydrocephalus  · 10/08/2019 - CT head without:  Continued evolution and demarcation of extensive bilateral posterior circulation infarcts with associated edema and sulcal effacement  Re-demonstrated evolving right thalamic infarct  Additional scattered small embolic infarcts are better seen on prior MRI  The 4th ventricle is mildly S a cyst however patent  The ventricle size is stable  Mild hyperdensities within the cerebellum similar to prior examination compatible with petechial hemorrhage  No large new intracranial hemorrhage is seen  · Plan for CTA head and neck w/wo tomorrow  · STAT CT head without contrast if decline in GCS >2pts/1h    Medical management:  · Prior patient was on aspirin and plavix for prior stroke, received DDAVP  · Due to the hemorrhage, patient was not a candidate for tPA  · Currently on non-bolus heparin drip with PTT goal 40-60  · PTT currently 47  · Neurology following for stroke management   · MAP >65   · SBP goal 120-160 - getting metoprolol 50mg q12h and labetalol 10mg q4h PRN   · Hgb goal is greater than or equal to 9 0  · Currently 7 8  · Received 2U PRBCs 10/7/19  · WBC 14 07, Tmax 102 6  · Na 135, recommend goal 145-155 to help with cerebral edema  · Ongoing family discussions  Palliative care consulted  · There was a family conversation on 10/09/2019 with Dr Samantha Veliz, Dr Armaan Tracey, patient's daughter and son present   Planning for additional family meeting today    · Continue medical management at this time  Planned CTA tomorrow  Leukocytosis  Assessment & Plan  · WBC 14 07    Essential hypertension  Assessment & Plan  · SBP goal 120-160    * Hemorrhagic stroke [facility])  Assessment & Plan  · Imaging with hemorrhagic conversion in prior stroke territory (appears stable)        Subjective/Objective   Chief Complaint: follow up on multiple strokes s/p thrombectomy    Subjective: family conversation took place yesterday with Dr Kb Dillon, Dr Lela Zayas, patient's daughter and son  Planning for another family meeting today  No overnight events  Objective: intubated    I/O       10/08 0701 - 10/09 0700 10/09 0701 - 10/10 0700 10/10 0701 - 10/11 0700    I V  (mL/kg) 1170 8 (15) 264 3 (3 4) 20 1 (0 3)    NG/ 50     IV Piggyback       Feedings 1100 1320 110    Total Intake(mL/kg) 2390 8 (30 6) 1634 3 (20 9) 130 1 (1 7)    Urine (mL/kg/hr) 1650 (0 9) 1900 (1) 75 (0 3)    Emesis/NG output  0     Blood       Total Output 1650 1900 75    Net +740 8 -265 7 +55 1                 Invasive Devices     Peripherally Inserted Central Catheter Line            PICC Line 21/94/76 Right Basilic 2 days          Drain            Urethral Catheter 16 Fr  3 days    NG/OG/Enteral Tube Nasogastric 12 Fr Left nares 2 days          Airway            ETT  Cuffed;Oral 7 5 mm 3 days    ETT  Cuffed;Oral;Hi-Lo 7 5 mm 2 days                Physical Exam:  Vitals: Blood pressure 128/60, pulse 80, temperature 99 °F (37 2 °C), resp  rate (!) 26, height 5' 10" (1 778 m), weight 78 2 kg (172 lb 6 4 oz), SpO2 98 %  ,Body mass index is 24 74 kg/m²  General appearance: appears stated age, and no distress  Head: Normocephalic  Eyes:right gaze deviation  Pupils equally reactive     Lungs: intubated, +cough   Heart: regular heart rate  Neurologic:   Mental status: GCS 6T (1E, 4M, 1VT)  Cranial nerves: grossly intact (Cranial nerves II-XII)  Motor: does not follow commands, withdrawals in RUE and BLE to deep painful stimuli, extends LUE to painful stimuli  Reflexes: DTR 1+, negative bowen, negative clonus      Lab Results:  Results from last 7 days   Lab Units 10/10/19  0505 10/09/19  0514 10/08/19  0516  10/07/19  1113 10/07/19  0417   WBC Thousand/uL 14 07* 11 80* 15 13*   < > 12 52* 10 72*   HEMOGLOBIN g/dL 7 8* 8 2* 8 1*   < > 10 0* 12 6   I STAT HEMOGLOBIN   --   --   --    < >  --   --    HEMATOCRIT % 23 9* 24 7* 24 4*   < > 29 9* 37 9   HEMATOCRIT, ISTAT   --   --   --    < >  --   --    PLATELETS Thousands/uL 206 205 196   < > 287 243   NEUTROS PCT %  --  77*  --   --  92* 82*   MONOS PCT %  --  10  --   --  3* 6    < > = values in this interval not displayed  Results from last 7 days   Lab Units 10/10/19  0505 10/09/19  0514 10/08/19  0516  10/07/19  1128   POTASSIUM mmol/L 3 8 3 8 3 7   < >  --    CHLORIDE mmol/L 102 109* 114*   < >  --    CO2 mmol/L 29 24 22   < >  --    CO2, I-STAT mmol/L  --   --   --   --  21   BUN mg/dL 15 13 15   < >  --    CREATININE mg/dL 0 78 0 82 0 97   < >  --    CALCIUM mg/dL 8 2* 8 2* 8 1*   < >  --    GLUCOSE, ISTAT mg/dl  --   --   --   --  157*    < > = values in this interval not displayed  Results from last 7 days   Lab Units 10/09/19  0514 10/08/19  0516 10/07/19  0417   MAGNESIUM mg/dL 2 2 2 0 2 4     Results from last 7 days   Lab Units 10/07/19  0417   PHOSPHORUS mg/dL 3 3     Results from last 7 days   Lab Units 10/10/19  0505 10/09/19  1946 10/08/19  1858  10/07/19  1713 10/07/19  0417 10/06/19  1836   INR   --   --   --   --  1 23* 1 03 1 01   PTT seconds 47* 45* 45*   < > 28 30 30    < > = values in this interval not displayed  No results found for: TROPONINT  ABG:No results found for: PHART, TFU9TEX, PO2ART, OML3OAD, F4XYFETV, BEART, SOURCE    Imaging Studies: I have personally reviewed pertinent reports  and I have personally reviewed pertinent films in PACS  CT head wo contrast   Final Result      1    Continued evolution and demarcation of extensive bilateral posterior circulation infarction with associated edema and sulcal effacement  Redemonstrated evolving right thalamic infarct  Additional scattered small embolic infarcts are better seen on    prior MRI  2   The 4th ventricle is mildly effaced however patent  The ventricular size is stable  3   Mild hyperdensities within the cerebellum similar to prior examination compatible with petechial hemorrhage  No large new intracranial hemorrhage is seen  Workstation performed: [id number]         MRI brain wo contrast   Final Result      Multifocal acute/recent bihemispheric supratentorial and infratentorial infarcts, as described above compatible with an embolic phenomenon  Findings have overall progressed since the MRI and are superimposed on the background of evolving areas of    previously seen infarction  Hemorrhagic conversion is noted associated with the bilateral cerebellar infarcts  Partial effacement of the 4th ventricle is noted without natacha hydrocephalus  I personally discussed this study with Dr Hiram Carr on 10/8/2019 at 7:25 PM                      Workstation performed: [id number]         CT head wo contrast   Final Result         1  Evolving hypodensities in the cerebellum compatible with resorbing contrast staining with small area of patchy residual hyperdensities noted  A small element of petechial hemorrhage not entirely excluded  No new hemorrhage  2   Extensive bilateral cerebellar and posterior cerebral artery recent infarctions with associated local sulcal effacement/mass effect  3   Moderate, chronic microangiopathy elsewhere  Workstation performed: [id number]         CT head wo contrast   Final Result      Stable bilateral occipital and cerebellar parenchymal hyperattenuation from contrast staining    Underlying infarction is suspected as there is persistent edema and crowding of the left more than right quadrigeminal cistern  It is difficult to assess for    underlying parenchymal hemorrhage  Continued surveillance imaging is recommended  Cerebral chronic microangiopathic disease  Extensive intracranial atherosclerotic disease  Workstation performed: [id number]         XR chest portable ICU   Final Result      No acute cardiopulmonary disease  ET tube 6 cm above the carlos  Workstation performed: [id number]         CT head wo contrast   Final Result      Multiple infarcts identified within the posterior fossa demonstrating increased density most likely representing contrast staining  Similar findings identified within the occipital lobes bilaterally as well as the right posterior thalamus  Superimposed    petechial hemorrhage within the infarcts not excluded  Extensive vascular calcification of the distal vertebral arteries and basilar artery  I personally discussed this study with Alison Travis on 10/7/2019 at 1:26 PM                            Workstation performed: [id number]         CT cerebral perfusion   Final Result      Early infarcts are identified within the occipital lobes and posterior fossa bilaterally with interval development of cortical hyperdensity present within both occipital lobes as well as the left superior cerebellum  This hyperdensity is consistent with    early cortical petechial hemorrhage  CT perfusion performed  Data available on PACS  Workstation performed: [id number]         CTA head and neck w wo contrast   Final Result      1  Subacute infarct at the right occipital lobe and left superior cerebellum, with similar findings of hemorrhagic transformation concern for intraparenchymal hematoma at the left superior cerebellum  2  New finding of near complete occlusion and loss of enhancement at the left V4 segment with thrombus extending into the proximal basilar artery    There is asymmetric decreased flow throughout the right P2 segment and more distal branches  Otherwise    vessel disease throughout the cervical segment of the left vertebral artery and right V4 segment are similar to CT examination of 9/30/2019  3   No intracranial aneurysm  No hemodynamically significant stenosis or occlusion of the major vessels of the [place] of [place]  4  Focal airspace disease at the left upper lobe, concerning for acute infiltrate, new since 9/30/2019  Findings discussed with Dr Nick Ramírez at 6:45 AM, 10/7/2019  Workstation performed: [id number]         CT head wo contrast   Final Result      Subacute infarcts at the right occipital lobe and superior aspect left cerebellum are again seen  Similar evidence of hemorrhagic change at the left superior cerebellum, concerning for intraparenchymal hematoma  MR brain may be obtained for further    evaluation  Moderate microvascular ischemic      Findings were discussed with Dr Nick Ramírez at 6:45 AM, 10/7/2019                     Workstation performed: [id number]         IR cerebral angiography / intervention    (Results Pending)         EKG, Pathology, and Other Studies: I have personally reviewed pertinent reports        VTE  Prophylaxis: Sequential compression device (Venodyne)  and Heparin

## 2019-10-10 NOTE — RESPIRATORY THERAPY NOTE
RT Ventilator Management Note  Earl Sears 80 y o  male MRN: [id number]  Unit/Bed#: ICU 11 Encounter: [id number]      Daily Screen       10/8/2019  0755 10/9/2019  0832          Patient safety screen outcome[de-identified]  Passed  Passed      Not Ready for Weaning due to[de-identified]  Underline problem not resolved  Underline problem not resolved      Spont breathing trial outcome[de-identified]          RSBI:    84              Physical Exam:   Assessment Type: Assess only  General Appearance: Sedated  Respiratory Pattern: Assisted  Chest Assessment: Chest expansion symmetrical  Bilateral Breath Sounds: Clear  Suction: ET Tube(PRN)  O2 Device: vent      Resp Comments: Pt continues on PSV mode  RR slightly elevated but stable thoughtout the evening  RSBI=77  BS clear  SpO2=99%  Plan to continue spontaneous mode, monitor for any changes

## 2019-10-10 NOTE — PHYSICAL THERAPY NOTE
Physical Therapy Cancellation Note    PT orders received, chart review performed  Pt is currently intubated/sedated and is not appropriate for active participation in PT evaluation  PT to hold evaluation, continue to follow       Baby Sheriff PT, DPT

## 2019-10-10 NOTE — QUICK NOTE
Called Julia Kim at [phone] in order to try to setup a time for goals of care discussion  No response  Left a voicemail with instructions and will try again tomorrow

## 2019-10-11 NOTE — DEATH NOTE
INPATIENT DEATH NOTE  Galen Martines 80 y o  male MRN: [id number]  Unit/Bed#: ICU 11 Encounter: [id number]    Date, Time and Cause of Death    Date of Death:  10/11/19  Time of Death:   6:08 PM  Preliminary Cause of Death:  Hemorrhagic cerebrovascular accident (CVA) ([facility] [address] )  Entered by:  Mary Nava Me[JP1 1]     Attribution     JP1 1 Sidney HartmanMe 10/11/19 18:55          PHYSICAL EXAM:  Unresponsive to noxious stimuli, Spontaneous respirations absent, Breath sounds absent, Carotid pulse absent, Heart sounds absent and Corneal blink reflex absent    Medical Examiner notification criteria:  NONE APPLICABLE  (Hemorrhagic stroke) leading to   Medical Examiner's office notified?:  No, does not meet ME notification criteria   Medical Examiner accepted case?:  No  Name of Medical Examiner: NA    Autopsy Options:  Post-mortem examination declined by next of kin  Pt is planned to be cremated     Primary Service Attending Physician notified?:  yes - Attending:  Herbie Ferguson DO    Physician/Resident responsible for completing Discharge     Summary:      79 y/o M with acute on chronic posterior circulation CVA 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

## 2019-10-11 NOTE — ASSESSMENT & PLAN NOTE
POD4 basilar artery thrombectomy with residual left PCA occlusion and right vertebral artery angioplasty  TICI 2B    Imaging personally reviewed and reviewed with attending:  · 10/7/19 - CTA head and neck w/wo: 1) subacute infarct at the right occipital lobe and left superior cerebellum, with similar findings of hemorrhagic transformation concern for intraparenchymal hematoma at the left superior cerebellum  2) New findings of near complete occlusion and loss of enhancement at the left V4 segment with thrombus extending into the proximal basilar artery  There is asymmetric decreased flow through the right P2 segment and more distal branches  otherwise vessel disease throughout the cervical segment of the left vertebral artery and right V4  Segment are similar to CT examination of 9/30/2019  3) no intracranial aneurysm  No hemodynamically significant stenosis or occlusion of the major vessels of the [place] of [place]  4) Focal airspace disease at the left upper lobe, concerning for acute infiltrate, new since 9/30/2019  · 10/7/19 - CT head wo: 1) multiple infarcts identified within the posterior fossa demonstrating increased density most likely representing contrast staining  Similar findings identified within within the occipital lobes bilaterally as well as the right posterior thalamus  Superimposed petechial hemorrhage within the infarcts not excluded  · 10/7/19 - CT head wo: 1) Stable bilateral occipital and cerebellar parenchymal hyperattenuation from contrast staining  Underlying infarction is suspected as there is persistent edema and crowding of the left more than right quadrigeminal cistern  It is difficult to assess for underlying parenchymal hemorrhage  Continued surveillance imaging is recommended  · 10/8/19 - MRI brain wo: Multifocal acute/recent bihemispheric supratentorial and infratentorial infarcts, as described above compatible with an embolic phenomenon    Findings have overall progressed since the MRI in our superimposed on background of evolving areas of previous seen infarcts  Hemorrhagic conversion is noted associated with the bilateral cerebellar infarcts  Partial effacement of the 4th ventricle is noted without natacha hydrocephalus  · 10/08/2019 - CT head without:  Continued evolution and demarcation of extensive bilateral posterior circulation infarcts with associated edema and sulcal effacement  Re-demonstrated evolving right thalamic infarct  Additional scattered small embolic infarcts are better seen on prior MRI  The 4th ventricle is mildly S a cyst however patent  The ventricle size is stable  Mild hyperdensities within the cerebellum similar to prior examination compatible with petechial hemorrhage  No large new intracranial hemorrhage is seen  · 10/11/19 - CTA head and neck w/wo: 1) she improved visualization of the left vertebral artery and basilar artery, presumably due to interval angioplasty  Comparison was made with the CT a study from October 7, 2019  Persistent moderate stenosis in the right intradural vertebral artery and right vertebral artery origin due to atherosclerotic plaque  There is a small filling defect in the distal right intradural vertebral artery segment which may represent nonocclusive clot/dissection  Follow-up MRA evaluation of the neck could be utilized with an extra-axial fat suppressed T1 sequence to assess for intramural hematoma/dissection  2) the left vertebral artery again demonstrates segmental areas of focal stenosis in its distal V2 cervical segment, unchanged from the prior study  Nonocclusive dissection remains a consideration  Otherwise, there is a stable moderate to severe stenosis at the origin of the left vertebral artery  3) stable bilateral cervical carotid atherosclerotic disease resulting in less than 50% stenosis at the ICA origins, again slightly worse on the left than the right    4) re-demonstrated multifocal cerebral and cerebellar subacute infarctions with resolving hemorrhage in the left superior cerebellum  Slight interval extension of the left MCA territory infarction involving the left posterior insula and perisylvian region when compared to prior CT and MRI studies from October 7th and 8th  Consider follow-up CT evaluation to assess for progressive intracranial ischemia/infarction  · STAT CT head without contrast if decline in GCS >2pts/1h    Medical management:  · Prior patient was on aspirin and plavix for prior stroke, received DDAVP  · Due to the hemorrhage, patient was not a candidate for tPA  · Currently on non-bolus heparin drip with PTT goal 40-60  · PTT currently 48  · Neurology following for stroke management   · MAP >65   · SBP goal 120-160 - getting metoprolol 50mg q12h and labetalol 10mg q4h PRN   · Hgb goal is greater than or equal to 9 0  · Currently 7 2  · Received 2U PRBCs 10/7/19  · WBC 10 21, Tmax 102 1  · Na 145, recommend goal 145-155 to help with cerebral edema currently on hypertonic 3%  · Ongoing family discussions  Palliative care consulted  · Planning for family meeting today  · Ideally patient should transition to DAPT if family wishes for continued care  · Continue medical management at this time
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
## 2019-10-11 NOTE — PHYSICAL THERAPY NOTE
Physical Therapy Cancellation Note    Pt remains intubated/sedated at this time  Pt has been cancelled the entire week secondary to intubation/sedation  PT to sign off at this time, please re-consult when pt is clinically and medically appropriate for PT evaluation   Thank you     Dennis Hernandez PT, DPT

## 2019-10-11 NOTE — RESPIRATORY THERAPY NOTE
RT Ventilator Management Note  Krystal Wray 80 y o  male MRN: [id number]  Unit/Bed#: ICU 11 Encounter: [id number]      Daily Screen       10/9/2019  0832 10/11/2019  0726          Patient safety screen outcome[de-identified]  Passed  Passed      Not Ready for Weaning due to[de-identified]  Underline problem not resolved        Spont breathing trial % for 30 min:          Spont breathing trial outcome[de-identified]          RSBI:  84                Physical Exam:   Assessment Type: Assess only  General Appearance: Sedated  Respiratory Pattern: Assisted, Spontaneous  Chest Assessment: Chest expansion symmetrical  Bilateral Breath Sounds: Coarse  Suction: ET Tube  O2 Device: vent      Resp Comments: (P) Pt on PS settins 5/5  Pt tolerating well RSBI 41, RR 15 SpO2 98%  Pt suctioned for lg amt yellow secretions bs coarse

## 2019-10-11 NOTE — DISCHARGE SUMMARY
Discharge Summary - Barb Yao 80 y o  male MRN: [id number]    Unit/Bed#: ICU 11 Encounter: [id number] PCP: Cherise Maynard MD    Admission Date:   Admission Orders (From admission, onward)     Ordered        10/07/19 0404  Inpatient Admission  Once                     Admitting Diagnosis: Stroke [facility]) [I63 9]  Ischemic stroke with hemorrhagic conversion      HPI:   Barb Yao is a 80 y o  male who presents with increase weakness on his left side after a stroke of unknown age found on MRI on   [facility] on 10/6      Previously () was found down with stool incontinence by son, patient "too weak to continue walking  Found to have occipital/cerebellar stroke with some hemosiderin deposits       Patient was also found to have vertebral artery stenosis  Was started on aspirin plavix       Presented to  with worsening left sided weakness and CT showed development of hemorrhagic transformation from prior CTA head neck sep 30      Was given DDAVP and transferred to [facility] at the request of family  Summary of Hospital Course:   Per Jeffrey Motta Petit-Me  79 y/o M with acute on chronic posterior circulation CVA 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 s/p hemorrhagic conversion on dual antiplatelet therapy  He underwent definitive management with mechanical thrombectomy, hyperosmolar therapy, and strict blood pressure management in the ICU without any neurologic improvement  He was transitioned to comfort care on 10/11/2019, compassionately extubated and  at 6:08 PM      Significant Findings, Care, Treatment and Services Provided:   CTA head and neck w wo contrast   Final Result by Bethanie Jo MD (10/11 103)      Improved visualization of the left vertebral artery and basilar artery, presumably due to interval angioplasty  Comparison was made with a CTA study from 2019    Persistent moderate stenosis in the right intradural vertebral artery and right    vertebral artery origin due to atherosclerotic plaque  There is a small filling defect in the distal right intradural vertebral artery segment which may represent nonocclusive clot/dissection  Follow-up MRA evaluation of the neck could be utilized with    an axial fat-suppressed T1 sequence to assess for intramural hematoma/dissection  The left vertebral artery again demonstrates segmental areas of focal stenosis in its distal V2 cervical segment, unchanged from the prior study  Nonocclusive dissection remains a consideration  Otherwise, there is stable moderate to severe stenosis at    the origin of the left vertebral artery  Stable bilateral cervical carotid atherosclerotic disease resulting in less than 50% stenosis at the ICA origins, again slightly worse on the left than the right  Redemonstrated multifocal cerebral and cerebellar subacute infarctions with resolving hemorrhage in the left superior cerebellum  Slight interval extension of the left MCA territory infarction involving the left posterior insular and perisylvian region    when compared to the prior CT and MRI studies from October 7 and 8  Consider follow-up CT evaluation to assess for progressive intracranial ischemia/infarction  The study was marked in [facility] for immediate notification  Workstation performed: [id number]         XR chest portable ICU   Final Result by Dora Marquez MD (10/10 [id number])      Left lung base opacity potentially reflecting pneumonia  Workstation performed: [id number]         CT head wo contrast   Final Result by Jessica Barth MD (10/08 9092)      1  Continued evolution and demarcation of extensive bilateral posterior circulation infarction with associated edema and sulcal effacement  Redemonstrated evolving right thalamic infarct  Additional scattered small embolic infarcts are better seen on    prior MRI     2   The 4th ventricle is mildly effaced however patent  The ventricular size is stable  3   Mild hyperdensities within the cerebellum similar to prior examination compatible with petechial hemorrhage  No large new intracranial hemorrhage is seen  Workstation performed: [id number]         MRI brain wo contrast   Final Result by Bishop Lorena MD (10/08 1943)      Multifocal acute/recent bihemispheric supratentorial and infratentorial infarcts, as described above compatible with an embolic phenomenon  Findings have overall progressed since the MRI and are superimposed on the background of evolving areas of    previously seen infarction  Hemorrhagic conversion is noted associated with the bilateral cerebellar infarcts  Partial effacement of the 4th ventricle is noted without natacha hydrocephalus  I personally discussed this study with Dr Rei Arvizu on 10/8/2019 at 7:25 PM                      Workstation performed: [id number]         CT head wo contrast   Final Result by Gem Lee MD (10/08 1018)         1  Evolving hypodensities in the cerebellum compatible with resorbing contrast staining with small area of patchy residual hyperdensities noted  A small element of petechial hemorrhage not entirely excluded  No new hemorrhage  2   Extensive bilateral cerebellar and posterior cerebral artery recent infarctions with associated local sulcal effacement/mass effect  3   Moderate, chronic microangiopathy elsewhere  Workstation performed: [id number]         CT head wo contrast   Final Result by Pushpa Barrios MD (10/07 1929)      Stable bilateral occipital and cerebellar parenchymal hyperattenuation from contrast staining  Underlying infarction is suspected as there is persistent edema and crowding of the left more than right quadrigeminal cistern  It is difficult to assess for    underlying parenchymal hemorrhage  Continued surveillance imaging is recommended        Cerebral chronic microangiopathic disease  Extensive intracranial atherosclerotic disease  Workstation performed: [id number]         XR chest portable ICU   Final Result by Bruno Beltrán MD (10/07 1364)      No acute cardiopulmonary disease  ET tube 6 cm above the carlos  Workstation performed: [id number]         CT head wo contrast   Final Result by Herrera Falk DO (10/07 1626)      Multiple infarcts identified within the posterior fossa demonstrating increased density most likely representing contrast staining  Similar findings identified within the occipital lobes bilaterally as well as the right posterior thalamus  Superimposed    petechial hemorrhage within the infarcts not excluded  Extensive vascular calcification of the distal vertebral arteries and basilar artery  I personally discussed this study with Morgan Farooq on 10/7/2019 at 1:26 PM                            Workstation performed: [id number]         CT cerebral perfusion   Final Result by Herrera Falk DO (10/07 0815)      Early infarcts are identified within the occipital lobes and posterior fossa bilaterally with interval development of cortical hyperdensity present within both occipital lobes as well as the left superior cerebellum  This hyperdensity is consistent with    early cortical petechial hemorrhage  CT perfusion performed  Data available on PACS  Workstation performed: [id number]         CTA head and neck w wo contrast   Final Result by Coleen Sunshine MD (10/07 5625)      1  Subacute infarct at the right occipital lobe and left superior cerebellum, with similar findings of hemorrhagic transformation concern for intraparenchymal hematoma at the left superior cerebellum  2  New finding of near complete occlusion and loss of enhancement at the left V4 segment with thrombus extending into the proximal basilar artery    There is asymmetric decreased flow throughout the right P2 segment and more distal branches  Otherwise    vessel disease throughout the cervical segment of the left vertebral artery and right V4 segment are similar to CT examination of 2019  3   No intracranial aneurysm  No hemodynamically significant stenosis or occlusion of the major vessels of the [place]  4  Focal airspace disease at the left upper lobe, concerning for acute infiltrate, new since 2019  Findings discussed with Dr Mariola Benedict at 6:45 AM, 10/7/2019  Workstation performed: [id number]         CT head wo contrast   Final Result by Rick Borjas MD (10/07 67)      Subacute infarcts at the right occipital lobe and superior aspect left cerebellum are again seen  Similar evidence of hemorrhagic change at the left superior cerebellum, concerning for intraparenchymal hematoma  MR brain may be obtained for further    evaluation  Moderate microvascular ischemic      Findings were discussed with Dr Mariola Benedict at 6:45 AM, 10/7/2019                     Workstation performed: [id number]         IR cerebral angiography / intervention    (Results Pending)         Disposition:      Final Diagnosis: Hemorrhagic CVA    Resolved Problems  Date Reviewed: 10/11/2019          Resolved    Cerebral edema ([address] ) 10/8/2019     Resolved by  Renroel Tavares          Condition at Time of Death: Comfort care    Date, Time and Cause of Death    Date of Death:  10/11/19  Time of Death:   6:08 PM  Preliminary Cause of Death:  Hemorrhagic cerebrovascular accident (CVA) ([address] )  Entered by:  Genna Alanis[JP1 1]     Attribution     JP1 1 Terry Lambert 10/11/19 18:55

## 2019-10-11 NOTE — PROGRESS NOTES
Progress Note - Nicolás Guerra 1935, 80 y o  male MRN: [id number]    Unit/Bed#: ICU 11 Encounter: [id number]    Primary Care Provider: Dre Kohler MD   Date and time admitted to hospital: 10/7/2019  3:28 AM        CVA (cerebral vascular accident) [facility])  Assessment & Plan  POD4 basilar artery thrombectomy with residual left PCA occlusion and right vertebral artery angioplasty  TICI 2B    Imaging personally reviewed and reviewed with attending:  · 10/7/19 - CTA head and neck w/wo: 1) subacute infarct at the right occipital lobe and left superior cerebellum, with similar findings of hemorrhagic transformation concern for intraparenchymal hematoma at the left superior cerebellum  2) New findings of near complete occlusion and loss of enhancement at the left V4 segment with thrombus extending into the proximal basilar artery  There is asymmetric decreased flow through the right P2 segment and more distal branches  otherwise vessel disease throughout the cervical segment of the left vertebral artery and right V4  Segment are similar to CT examination of 9/30/2019  3) no intracranial aneurysm  No hemodynamically significant stenosis or occlusion of the major vessels of the [place]  4) Focal airspace disease at the left upper lobe, concerning for acute infiltrate, new since 9/30/2019  · 10/7/19 - CT head wo: 1) multiple infarcts identified within the posterior fossa demonstrating increased density most likely representing contrast staining  Similar findings identified within within the occipital lobes bilaterally as well as the right posterior thalamus  Superimposed petechial hemorrhage within the infarcts not excluded  · 10/7/19 - CT head wo: 1) Stable bilateral occipital and cerebellar parenchymal hyperattenuation from contrast staining  Underlying infarction is suspected as there is persistent edema and crowding of the left more than right quadrigeminal cistern   It is difficult to assess for underlying parenchymal hemorrhage  Continued surveillance imaging is recommended  · 10/8/19 - MRI brain wo: Multifocal acute/recent bihemispheric supratentorial and infratentorial infarcts, as described above compatible with an embolic phenomenon  Findings have overall progressed since the MRI in our superimposed on background of evolving areas of previous seen infarcts  Hemorrhagic conversion is noted associated with the bilateral cerebellar infarcts  Partial effacement of the 4th ventricle is noted without natacha hydrocephalus  · 10/08/2019 - CT head without:  Continued evolution and demarcation of extensive bilateral posterior circulation infarcts with associated edema and sulcal effacement  Re-demonstrated evolving right thalamic infarct  Additional scattered small embolic infarcts are better seen on prior MRI  The 4th ventricle is mildly S a cyst however patent  The ventricle size is stable  Mild hyperdensities within the cerebellum similar to prior examination compatible with petechial hemorrhage  No large new intracranial hemorrhage is seen  · 10/11/19 - CTA head and neck w/wo: 1) she improved visualization of the left vertebral artery and basilar artery, presumably due to interval angioplasty  Comparison was made with the CT a study from October 7, 2019  Persistent moderate stenosis in the right intradural vertebral artery and right vertebral artery origin due to atherosclerotic plaque  There is a small filling defect in the distal right intradural vertebral artery segment which may represent nonocclusive clot/dissection  Follow-up MRA evaluation of the neck could be utilized with an extra-axial fat suppressed T1 sequence to assess for intramural hematoma/dissection  2) the left vertebral artery again demonstrates segmental areas of focal stenosis in its distal V2 cervical segment, unchanged from the prior study  Nonocclusive dissection remains a consideration    Otherwise, there is a stable moderate to severe stenosis at the origin of the left vertebral artery  3) stable bilateral cervical carotid atherosclerotic disease resulting in less than 50% stenosis at the ICA origins, again slightly worse on the left than the right  4) re-demonstrated multifocal cerebral and cerebellar subacute infarctions with resolving hemorrhage in the left superior cerebellum  Slight interval extension of the left MCA territory infarction involving the left posterior insula and perisylvian region when compared to prior CT and MRI studies from October 7th and 8th  Consider follow-up CT evaluation to assess for progressive intracranial ischemia/infarction  · STAT CT head without contrast if decline in GCS >2pts/1h    Medical management:  · Prior patient was on aspirin and plavix for prior stroke, received DDAVP  · Due to the hemorrhage, patient was not a candidate for tPA  · Currently on non-bolus heparin drip with PTT goal 40-60  · PTT currently 48  · Neurology following for stroke management   · MAP >65   · SBP goal 120-160 - getting metoprolol 50mg q12h and labetalol 10mg q4h PRN   · Hgb goal is greater than or equal to 9 0  · Currently 7 2  · Received 2U PRBCs 10/7/19  · WBC 10 21, Tmax 102 1  · Na 145, recommend goal 145-155 to help with cerebral edema currently on hypertonic 3%  · Ongoing family discussions  Palliative care consulted  · Planning for family meeting today  · Ideally patient should transition to DAPT if family wishes for continued care  · Continue medical management at this time  Leukocytosis  Assessment & Plan  · WBC 10 21    Essential hypertension  Assessment & Plan  · SBP goal 120-160    * Hemorrhagic stroke [facility])  Assessment & Plan  · Imaging with hemorrhagic conversion in prior stroke territory (appears stable)        Subjective/Objective   Chief Complaint: follow up on basilar artery thrombectomy    Subjective: no events overnight   On going family discussion    Objective: intubated    I/O       10/09 0701 - 10/10 0700 10/10 0701 - 10/11 0700 10/11 0701 - 10/12 0700    I V  (mL/kg) 264 3 (3 4) 1006 1 (12 9) 159 (2)    NG/GT 50 140 30    Feedings 1320 1100 220    Total Intake(mL/kg) 1634 3 (20 9) 2246 1 (28 7) 409 (5 2)    Urine (mL/kg/hr) 1900 (1) 1950 (1) 450 (1 2)    Emesis/NG output 0 0     Total Output 1900 1950 450    Net -265 7 +296 1 -41                 Invasive Devices     Peripherally Inserted Central Catheter Line            PICC Line 67/15/23 Right Basilic 3 days          Drain            Urethral Catheter 16 Fr  4 days    NG/OG/Enteral Tube Nasogastric 12 Fr Left nares 3 days          Airway            ETT  Cuffed;Oral 7 5 mm 4 days    ETT  Cuffed;Oral;Hi-Lo 7 5 mm 4 days                Physical Exam:  Vitals: Blood pressure 133/65, pulse 82, temperature 99 5 °F (37 5 °C), temperature source Rectal, resp  rate 21, height 5' 10" (1 778 m), weight 78 2 kg (172 lb 6 4 oz), SpO2 99 %  ,Body mass index is 24 74 kg/m²  General appearance: appears stated age and no distress  Head: Normocephalic  Eyes: does not open eyes to pain or voice  +dysconjugate gaze, left eye inward deviated  Pupils 4mm bilaterally and briskly reactive  +corneal reflexes  Lungs: intubated, +weak cough  Heart: regular heart rate  Neurologic:   Mental status: GCS 6T (1E, 4M, 1VT)   Grimaces to painful stimuli   Motor: does not follow commands, no movement in BUE to painful stimuli, withdrawals in BLE   Reflexes: negative bowen, negative clonus      Lab Results:  Results from last 7 days   Lab Units 10/11/19  0501 10/10/19  0505 10/09/19  0514  10/07/19  1113   WBC Thousand/uL 10 21* 14 07* 11 80*   < > 12 52*   HEMOGLOBIN g/dL 7 2* 7 8* 8 2*   < > 10 0*   I STAT HEMOGLOBIN   --   --   --    < >  --    HEMATOCRIT % 22 4* 23 9* 24 7*   < > 29 9*   HEMATOCRIT, ISTAT   --   --   --    < >  --    PLATELETS Thousands/uL 223 206 205   < > 287   NEUTROS PCT % 84*  --  77*  --  92*   MONOS PCT % 8  --  10  --  3* < > = values in this interval not displayed  Results from last 7 days   Lab Units 10/11/19  0455 10/11/19  0015 10/10/19  1743  10/07/19  1128   POTASSIUM mmol/L 3 6 3 9 4 1   < >  --    CHLORIDE mmol/L 109* 108 106   < >  --    CO2 mmol/L 27 30 28   < >  --    CO2, I-STAT mmol/L  --   --   --   --  21   BUN mg/dL 17 18 17   < >  --    CREATININE mg/dL 0 86 0 83 0 80   < >  --    CALCIUM mg/dL 8 1* 8 3 8 1*   < >  --    GLUCOSE, ISTAT mg/dl  --   --   --   --  157*    < > = values in this interval not displayed  Results from last 7 days   Lab Units 10/09/19  0514 10/08/19  0516 10/07/19  0417   MAGNESIUM mg/dL 2 2 2 0 2 4     Results from last 7 days   Lab Units 10/07/19  0417   PHOSPHORUS mg/dL 3 3     Results from last 7 days   Lab Units 10/11/19  0455 10/10/19  0505 10/09/19  1946  10/07/19  1713 10/07/19  0417 10/06/19  1836   INR   --   --   --   --  1 23* 1 03 1 01   PTT seconds 48* 47* 45*   < > 28 30 30    < > = values in this interval not displayed  No results found for: TROPONINT  ABG:No results found for: PHART, YJU9BJN, PO2ART, SGE0XSO, Y4VYCPLC, BEART, SOURCE    Imaging Studies: I have personally reviewed pertinent reports  and I have personally reviewed pertinent films in PACS  CTA head and neck w wo contrast   Final Result      Improved visualization of the left vertebral artery and basilar artery, presumably due to interval angioplasty  Comparison was made with a CTA study from October 7, 2019  Persistent moderate stenosis in the right intradural vertebral artery and right    vertebral artery origin due to atherosclerotic plaque  There is a small filling defect in the distal right intradural vertebral artery segment which may represent nonocclusive clot/dissection  Follow-up MRA evaluation of the neck could be utilized with    an axial fat-suppressed T1 sequence to assess for intramural hematoma/dissection        The left vertebral artery again demonstrates segmental areas of focal stenosis in its distal V2 cervical segment, unchanged from the prior study  Nonocclusive dissection remains a consideration  Otherwise, there is stable moderate to severe stenosis at    the origin of the left vertebral artery  Stable bilateral cervical carotid atherosclerotic disease resulting in less than 50% stenosis at the ICA origins, again slightly worse on the left than the right  Redemonstrated multifocal cerebral and cerebellar subacute infarctions with resolving hemorrhage in the left superior cerebellum  Slight interval extension of the left MCA territory infarction involving the left posterior insular and perisylvian region    when compared to the prior CT and MRI studies from October 7 and 8  Consider follow-up CT evaluation to assess for progressive intracranial ischemia/infarction  The study was marked in [facility] for immediate notification  Workstation performed: [id number]         XR chest portable ICU   Final Result      Left lung base opacity potentially reflecting pneumonia  Workstation performed: [id number]         CT head wo contrast   Final Result      1  Continued evolution and demarcation of extensive bilateral posterior circulation infarction with associated edema and sulcal effacement  Redemonstrated evolving right thalamic infarct  Additional scattered small embolic infarcts are better seen on    prior MRI  2   The 4th ventricle is mildly effaced however patent  The ventricular size is stable  3   Mild hyperdensities within the cerebellum similar to prior examination compatible with petechial hemorrhage  No large new intracranial hemorrhage is seen  Workstation performed: [id number]         MRI brain wo contrast   Final Result      Multifocal acute/recent bihemispheric supratentorial and infratentorial infarcts, as described above compatible with an embolic phenomenon    Findings have overall progressed since the MRI and are superimposed on the background of evolving areas of    previously seen infarction  Hemorrhagic conversion is noted associated with the bilateral cerebellar infarcts  Partial effacement of the 4th ventricle is noted without natacha hydrocephalus  I personally discussed this study with Dr Rei Arvizu on 10/8/2019 at 7:25 PM                      Workstation performed: [id number]         CT head wo contrast   Final Result         1  Evolving hypodensities in the cerebellum compatible with resorbing contrast staining with small area of patchy residual hyperdensities noted  A small element of petechial hemorrhage not entirely excluded  No new hemorrhage  2   Extensive bilateral cerebellar and posterior cerebral artery recent infarctions with associated local sulcal effacement/mass effect  3   Moderate, chronic microangiopathy elsewhere  Workstation performed: [id number]         CT head wo contrast   Final Result      Stable bilateral occipital and cerebellar parenchymal hyperattenuation from contrast staining  Underlying infarction is suspected as there is persistent edema and crowding of the left more than right quadrigeminal cistern  It is difficult to assess for    underlying parenchymal hemorrhage  Continued surveillance imaging is recommended  Cerebral chronic microangiopathic disease  Extensive intracranial atherosclerotic disease  Workstation performed: [id number]         XR chest portable ICU   Final Result      No acute cardiopulmonary disease  ET tube 6 cm above the carlos  Workstation performed: [id number]         CT head wo contrast   Final Result      Multiple infarcts identified within the posterior fossa demonstrating increased density most likely representing contrast staining  Similar findings identified within the occipital lobes bilaterally as well as the right posterior thalamus  Superimposed    petechial hemorrhage within the infarcts not excluded  Extensive vascular calcification of the distal vertebral arteries and basilar artery  I personally discussed this study with Avtar Huertas on 10/7/2019 at 1:26 PM                            Workstation performed: [id number]         CT cerebral perfusion   Final Result      Early infarcts are identified within the occipital lobes and posterior fossa bilaterally with interval development of cortical hyperdensity present within both occipital lobes as well as the left superior cerebellum  This hyperdensity is consistent with    early cortical petechial hemorrhage  CT perfusion performed  Data available on PACS  Workstation performed: [id number]         CTA head and neck w wo contrast   Final Result      1  Subacute infarct at the right occipital lobe and left superior cerebellum, with similar findings of hemorrhagic transformation concern for intraparenchymal hematoma at the left superior cerebellum  2  New finding of near complete occlusion and loss of enhancement at the left V4 segment with thrombus extending into the proximal basilar artery  There is asymmetric decreased flow throughout the right P2 segment and more distal branches  Otherwise    vessel disease throughout the cervical segment of the left vertebral artery and right V4 segment are similar to CT examination of 9/30/2019  3   No intracranial aneurysm  No hemodynamically significant stenosis or occlusion of the major vessels of the [place]  4  Focal airspace disease at the left upper lobe, concerning for acute infiltrate, new since 9/30/2019  Findings discussed with Dr Bridget Patel at 6:45 AM, 10/7/2019  Workstation performed: [id number]         CT head wo contrast   Final Result      Subacute infarcts at the right occipital lobe and superior aspect left cerebellum are again seen    Similar evidence of hemorrhagic change at the left superior cerebellum, concerning for intraparenchymal hematoma  MR brain may be obtained for further    evaluation  Moderate microvascular ischemic      Findings were discussed with Dr Francesco Alicea at 6:45 AM, 10/7/2019                     Workstation performed: [id number]         IR cerebral angiography / intervention    (Results Pending)         EKG, Pathology, and Other Studies: I have personally reviewed pertinent reports        VTE  Prophylaxis: Sequential compression device (Venodyne)  and Heparin

## 2019-10-11 NOTE — RESPIRATORY THERAPY NOTE
Resp Care      10/11/19 1716   Respiratory Assessment   Resp Comments Pt terminally extubated, extubation order obtained   Additional Assessments   SpO2 96 %

## 2019-10-11 NOTE — PROGRESS NOTES
Progress Note - Critical Care   Froilan Frankel 80 y o  male MRN: [id number]  Unit/Bed#: ICU 11 Encounter: [id number]    Assessment:   Patient Active Problem List   Diagnosis    Essential hypertension    Pure hypercholesterolemia    Depression    Chronic pain    ASCVD (arteriosclerotic cardiovascular disease)    [facility] ([facility]    Coronary artery disease involving native coronary artery of native heart without angina pectoris    H/O prostate cancer    Acute left-sided low back pain with sciatica    SARIKA (generalized anxiety disorder)    Headache    CVA [facility]cerebral vascular accident) [facility]    [facility]    Hemorrhagic stroke ([facility] )    Leukocytosis    Urinary retention       Plan:    Family meeting planned today at 1:30 pm with palliative and neurology  Neuro:  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:  -S/P Angiogram w/ Basilar Artery Thrombectomy POD #4   -Multiple cerebral and cerebellar territory strokes with hemorrhagic conversion, cerebral edema, and mass effect    -Intubated; currently no sedation  -CT head 10/08:  1   Continued evolution and demarcation of extensive bilateral posterior circulation infarction with associated edema and sulcal effacement   Redemonstrated evolving right thalamic infarct   Additional scattered small embolic infarcts are better seen on prior MRI  2   The 4th ventricle is mildly effaced however patent   The ventricular size is stable  3   Mild hyperdensities within the cerebellum similar to prior examination compatible with petechial hemorrhage   No large new intracranial hemorrhage is seen  - MRI of the head 10/8: Multifocal acute/recent bihemispheric supratentorial and infratentorial infarcts, as compatible with an embolic phenomenon   Findings have overall progressed since the MRI and are superimposed on the background of evolving areas of previously seen infarction   Hemorrhagic conversion is noted associated with the bilateral cerebellar infarcts  Partial effacement of the 4th ventricle is noted without natacha hydrocephalus    -Low dose heparin gtt (PTT goal 40-60) was initiated due to basilar artery thrombosis; Monitor PTTs with goals 40-60  -SBP goals 130-150  -Continue lipitor 40 mg  -Pending CTA head and neck                  CV:   Hypertension:  -Continue metoprolol 50 mg BID; PRN IV labetalol  -SBP goals 130-150; MAPs>65                 Lung:  Acute hypoxic respiratory failure  -PSV: FIO2 40%, PEEP 5, Pressure support 5  -Maintain SPO2 >95%                 GI:  -Zofran PRN  -Stress ulcer prophylaxis: No                 FEN:   -Nutrition: Keofed tube feeds w/ Jevity 1 2 Nato, rate 60ml/hr  -Monitor BMP; replete electrolytes as needed with goal K>4, phos>3, and Mag>2  -Na goals 145-155 for cerebral edema  -Currently on 3% NS 30 cc/hr                 :  -Cortez catheter in place   -Cr at baseline  -Trend Cr                 ID:   Leukocytosis  -WBC 10 21; trending down  -Afebrile; Possibly stress induced  -Trend CBC                 Heme:  Anemia 2/2 critical illness  -Hgb 7 2  -PTT 48 on 10/11  Goal 40-60                   Endo:   Hyperglycemia  -goal 140-180  -Currently on insulin drip algorithm 1                            Msk/Skin:  Frequent repositioning for pressure ulcer prophylaxis                 Disposition: Pending family discussion    Chief Complaint: Left sided weakness    HPI/24hr events:     24 hr events - none  Review of Systems   Unable to perform ROS: Intubated       Physical Exam:   Physical Exam   Constitutional: He appears well-developed and well-nourished  HENT:   Head: Normocephalic and atraumatic  Neck: Neck supple  Cardiovascular: Normal rate and regular rhythm  Exam reveals no gallop and no friction rub  No murmur heard  Pulmonary/Chest:   On ventilator PSV   Neurological:   GCS 5-6; E (1), V (1), M(3-4)   Patient withdraws/flexes to pain on the right arm, right leg, and left leg  Right sided gaze  Pupils small, but reactive  Weak gag reflex  Cough reflex intact   Skin: Skin is warm and dry  Psychiatric:   Unable to assess due to pt being nonverbal and nonresponsive  Vitals:    10/11/19 0035 10/11/19 0135 10/11/19 0235 10/11/19 0335   BP: 118/55 150/68 143/66 126/55   BP Location: Left arm      Pulse: 80 88 92 96   Resp: (!) 23 (!) 23 (!) 25 (!) 27   Temp: 99 7 °F (37 6 °C)      TempSrc: Oral      SpO2: 99% 99% 98% 99%   Weight:       Height:         Arterial Line BP: 218/56  Arterial Line MAP (mmHg): 100 mmHg    Temperature:   Temp (24hrs), Av 6 °F (37 6 °C), Min:98 6 °F (37 °C), Max:101 3 °F (38 5 °C)    Current: Temperature: 99 7 °F (37 6 °C)    Weights:   IBW: 73 kg    Body mass index is 24 74 kg/m²    Weight (last 2 days)     Date/Time   Weight    10/09/19 0551   78 2 (172 4)            Non-Invasive/Invasive Ventilation Settings:  Respiratory    Lab Data (Last 4 hours)    None         O2/Vent Data (Last 4 hours)      10/11 0426           Vent Mode CPAP/PS Spont       FIO2 (%) (%) 40       PEEP (cmH2O) (cmH2O) 5       Pressure Support (cmH2O) (cmH20) 5       MV (Obs) 8       RSBI 79                 No results found for: PHART, CBI1WWW, PO2ART, RKC5JNM, J7IBEJRL, BEART, SOURCE    Intake and Outputs:  I/O       10/09 0701 - 10/10 0700 10/10 0701 - 10/11 0700    I V  (mL/kg) 264 3 (3 4) 928 5 (11 9)    NG/GT 50 140    Feedings 1320 990    Total Intake(mL/kg) 1634 3 (20 9) 2058 5 (26 3)    Urine (mL/kg/hr) 1900 (1) 1850 (1)    Emesis/NG output 0 0    Total Output 1900 1850    Net -265 7 +208 5              Nutrition:        Diet Orders   (From admission, onward)             Start     Ordered    10/07/19 1323  Diet Enteral/Parenteral; Tube Feeding No Oral Diet; Jevity 1 2 Nato; Continuous; 55  Diet effective now     Comments:  Start at 20 m/l hr, titrate by 10-20 ml/hr every 4 hours to goal    Question Answer Comment   Diet Type Enteral/Parenteral Enteral/Parenteral Tube Feeding No Oral Diet    Tube Feeding Formula: Jevity 1 2 Nato    Bolus/Cyclic/Continuous Continuous    Tube Feeding Goal Rate (mL/hr): 55    RD to adjust diet per protocol? Yes        10/07/19 1326                Labs:   Results from last 7 days   Lab Units 10/11/19  0501 10/10/19  0505 10/09/19  0514  10/07/19  1113   WBC Thousand/uL 10 21* 14 07* 11 80*   < > 12 52*   HEMOGLOBIN g/dL 7 2* 7 8* 8 2*   < > 10 0*   I STAT HEMOGLOBIN   --   --   --    < >  --    HEMATOCRIT % 22 4* 23 9* 24 7*   < > 29 9*   HEMATOCRIT, ISTAT   --   --   --    < >  --    PLATELETS Thousands/uL 223 206 205   < > 287   NEUTROS PCT % 84*  --  77*  --  92*   MONOS PCT % 8  --  10  --  3*    < > = values in this interval not displayed  Results from last 7 days   Lab Units 10/11/19  0455 10/11/19  0015 10/10/19  1743  10/07/19  1128   SODIUM mmol/L 145 142 139   < >  --    POTASSIUM mmol/L 3 6 3 9 4 1   < >  --    CHLORIDE mmol/L 109* 108 106   < >  --    CO2 mmol/L 27 30 28   < >  --    CO2, I-STAT mmol/L  --   --   --   --  21   BUN mg/dL 17 18 17   < >  --    CREATININE mg/dL 0 86 0 83 0 80   < >  --    CALCIUM mg/dL 8 1* 8 3 8 1*   < >  --    GLUCOSE, ISTAT mg/dl  --   --   --   --  157*    < > = values in this interval not displayed  Results from last 7 days   Lab Units 10/09/19  0514 10/08/19  0516 10/07/19  0417   MAGNESIUM mg/dL 2 2 2 0 2 4     Results from last 7 days   Lab Units 10/07/19  0417   PHOSPHORUS mg/dL 3 3      Results from last 7 days   Lab Units 10/11/19  0455 10/10/19  0505 10/09/19  1946  10/07/19  1713 10/07/19  0417 10/06/19  1836   INR   --   --   --   --  1 23* 1 03 1 01   PTT seconds 48* 47* 45*   < > 28 30 30    < > = values in this interval not displayed           0   Lab Value Date/Time    TROPONINI 0 11 (H) 09/30/2019 1519    TROPONINI 0 09 (H) 09/30/2019 1154    TROPONINI 0 11 (H) 09/30/2019 0825    TROPONINI <0 02 08/06/2019 1449    TROPONINI <0 02 02/06/2017 0045    TROPONINI <0 02 02/05/2017 2159    TROPONINI <0 02 02/05/2017 1806    TROPONINI <0 04 04/20/2014 0520    TROPONINI <0 04 04/19/2014 2340    TROPONINI <0 04 04/19/2014 1620       Imaging:  I have personally reviewed pertinent films in PACS    EKG: NSR    Micro:  Lab Results   Component Value Date    URINECX No Growth <1000 cfu/mL 12/27/2018    URINECX >100,000 cfu/ml Escherichia coli (A) 09/17/2018       Allergies: Allergies   Allergen Reactions    Augmentin [Amoxicillin-Pot Clavulanate] GI Intolerance       Medications:   Scheduled Meds:  Current Facility-Administered Medications:  acetaminophen 650 mg Oral Q6H PRN Ruma Steel MD    atorvastatin 40 mg Oral QPM Gopal Wahl MD    chlorhexidine 15 mL Swish & Spit Q12H [facility] Maximus Colby MD    heparin (porcine) 300-2,000 Units/hr Intravenous Titrated Frank Duvall MD Last Rate: 590 Units/hr (10/10/19 0510)   insulin regular (HumuLIN R,NovoLIN R) infusion 0 3-21 Units/hr Intravenous Titrated Ruma Steel MD Last Rate: 3 Units/hr (10/11/19 0414)   Labetalol HCl 10 mg Intravenous Q4H PRN Jamilah Vogt MD    metoprolol tartrate 50 mg Oral Q12H [address], [place]    sodium chloride 30 mL/hr Intravenous Continuous Abhijit Holman PA-C Last Rate: 30 mL/hr (10/10/19 1338)     Continuous Infusions:  heparin (porcine) 300-2,000 Units/hr Last Rate: 590 Units/hr (10/10/19 0510)   insulin regular (HumuLIN R,NovoLIN R) infusion 0 3-21 Units/hr Last Rate: 3 Units/hr (10/11/19 0414)   sodium chloride 30 mL/hr Last Rate: 30 mL/hr (10/10/19 1338)     PRN Meds:    acetaminophen 650 mg Q6H PRN   Labetalol HCl 10 mg Q4H PRN       VTE Pharmacologic Prophylaxis: Heparin  VTE Mechanical Prophylaxis: sequential compression device    Invasive lines and devices:   Invasive Devices     Peripherally Inserted Central Catheter Line            PICC Line 92/53/06 Right Basilic 3 days          Drain            NG/OG/Enteral Tube Nasogastric 12 Fr Left nares 3 days Urethral Catheter 16 Fr  3 days          Airway            ETT  Cuffed;Oral 7 5 mm 3 days    ETT  Cuffed;Oral;Hi-Lo 7 5 mm 3 days                    Code Status: Level 2 - DNAR: but accepts endotracheal intubation     Portions of the record may have been created with voice recognition software  Occasional wrong word or "sound a like" substitutions may have occurred due to the inherent limitations of voice recognition software  Read the chart carefully and recognize, using context, where substitutions have occurred       Cat Gongora MD

## 2019-10-11 NOTE — OCCUPATIONAL THERAPY NOTE
OT CANCEL NOTE    OT orders received  Chart reviewed  Pt is currently intubated/sedated and not appropriate to engage in skilled OT services at this time  Pt has been cancelled all week due to being intubated/sedated  Will D/C OT at this time  Please re-consult once medically stable       Roxana Arzate MOT, OTR/L

## 2019-10-11 NOTE — PROGRESS NOTES
Pt to become level 4 comfort care  Family at the bedside with patient    One time dose of fentanyl 50mcg iv given and fentanyl gtt initiated at 25 mcg/hr

## 2019-10-11 NOTE — SOCIAL WORK
RIVASW joined other hospital practitioners to discuss the patient's current medical status with the patient's son, daughter, and son-in-law  The family reviewed the medical information and at this time has decided to change to comfort focused interventions  The family intends to stay until the patient expires  The family has been informed that if the patient continues to be stable that he will be moved from the ICU to medical surgical floor and even possibly the [facility]  The family will inform the nursing staff when they are ready to begin comfort  Palliative will continue to follow as requested by the medical team and family  Counseling / Coordination of Care  Total floor / unit time spent today 120 minutes  Greater than 50% of total time was spent with the patient and / or family counseling and / or coordination of care   A description of the counseling / coordination of care: family meeting

## 2020-09-30 NOTE — RESPIRATORY THERAPY NOTE
RT Ventilator Management Note  Theone Brood 80 y o  male MRN: [id number]  Unit/Bed#: ICU 11 Encounter: [id number]      Daily Screen       10/8/2019  0755 10/9/2019  0832          Patient safety screen outcome[de-identified]  Passed  Passed      Not Ready for Weaning due to[de-identified]  Underline problem not resolved  Underline problem not resolved      Spont breathing trial outcome[de-identified]          RSBI:    84              Physical Exam:   Assessment Type: Assess only  General Appearance: Eyes do not open to any stimulus  Respiratory Pattern: Assisted  Chest Assessment: Chest expansion symmetrical  Bilateral Breath Sounds: Diminished  Suction: ET Tube  O2 Device: vent      Resp Comments: pt continues on psv settings, no vent changes made at this time, will continue to monitor pt Anesthesia Type: 1% lidocaine with epinephrine

## 2022-05-31 NOTE — PROGRESS NOTES
Labs reviewed and unremarkable Pt presents to ed with c/o having chest pain, dizziness, and bilateral ear pain with drainage that started Saturday
